# Patient Record
Sex: FEMALE | Race: WHITE | Employment: OTHER | URBAN - METROPOLITAN AREA
[De-identification: names, ages, dates, MRNs, and addresses within clinical notes are randomized per-mention and may not be internally consistent; named-entity substitution may affect disease eponyms.]

---

## 2019-01-16 ENCOUNTER — OP HISTORICAL/CONVERTED ENCOUNTER (OUTPATIENT)
Dept: OTHER | Age: 79
End: 2019-01-16

## 2019-01-31 ENCOUNTER — OP HISTORICAL/CONVERTED ENCOUNTER (OUTPATIENT)
Dept: OTHER | Age: 79
End: 2019-01-31

## 2019-02-01 ENCOUNTER — OP HISTORICAL/CONVERTED ENCOUNTER (OUTPATIENT)
Dept: OTHER | Age: 79
End: 2019-02-01

## 2019-02-04 ENCOUNTER — OP HISTORICAL/CONVERTED ENCOUNTER (OUTPATIENT)
Dept: OTHER | Age: 79
End: 2019-02-04

## 2019-02-11 ENCOUNTER — OP HISTORICAL/CONVERTED ENCOUNTER (OUTPATIENT)
Dept: OTHER | Age: 79
End: 2019-02-11

## 2019-03-01 ENCOUNTER — OP HISTORICAL/CONVERTED ENCOUNTER (OUTPATIENT)
Dept: OTHER | Age: 79
End: 2019-03-01

## 2019-03-04 ENCOUNTER — OP HISTORICAL/CONVERTED ENCOUNTER (OUTPATIENT)
Dept: OTHER | Age: 79
End: 2019-03-04

## 2019-05-24 ENCOUNTER — OP HISTORICAL/CONVERTED ENCOUNTER (OUTPATIENT)
Dept: OTHER | Age: 79
End: 2019-05-24

## 2021-08-06 ENCOUNTER — HOSPITAL ENCOUNTER (OUTPATIENT)
Dept: RADIATION THERAPY | Age: 81
Discharge: HOME | End: 2021-08-06

## 2022-01-10 ENCOUNTER — APPOINTMENT (OUTPATIENT)
Dept: GENERAL RADIOLOGY | Age: 82
DRG: 190 | End: 2022-01-10
Attending: EMERGENCY MEDICINE
Payer: MEDICARE

## 2022-01-10 ENCOUNTER — HOSPITAL ENCOUNTER (INPATIENT)
Age: 82
LOS: 2 days | Discharge: HOME | DRG: 190 | End: 2022-01-12
Attending: EMERGENCY MEDICINE | Admitting: HOSPITALIST
Payer: MEDICARE

## 2022-01-10 DIAGNOSIS — I24.9 ACS (ACUTE CORONARY SYNDROME) (HCC): Primary | ICD-10-CM

## 2022-01-10 PROBLEM — R06.02 SHORTNESS OF BREATH: Status: ACTIVE | Noted: 2022-01-10

## 2022-01-10 PROBLEM — R77.8 ELEVATED TROPONIN: Status: ACTIVE | Noted: 2022-01-10

## 2022-01-10 LAB
ALBUMIN SERPL-MCNC: 3.2 G/DL (ref 3.5–5)
ALBUMIN/GLOB SERPL: 1 {RATIO} (ref 1.1–2.2)
ALP SERPL-CCNC: 52 U/L (ref 45–117)
ALT SERPL-CCNC: 24 U/L (ref 12–78)
ANION GAP SERPL CALC-SCNC: 14 MMOL/L (ref 5–15)
AST SERPL W P-5'-P-CCNC: 29 U/L (ref 15–37)
ATRIAL RATE: 115 BPM
ATRIAL RATE: 94 BPM
BASOPHILS # BLD: 0 K/UL (ref 0–0.1)
BASOPHILS NFR BLD: 0 % (ref 0–1)
BILIRUB SERPL-MCNC: 0.7 MG/DL (ref 0.2–1)
BUN SERPL-MCNC: 14 MG/DL (ref 6–20)
BUN/CREAT SERPL: 15 (ref 12–20)
CA-I BLD-MCNC: 7.8 MG/DL (ref 8.5–10.1)
CALCULATED P AXIS, ECG09: 63 DEGREES
CALCULATED P AXIS, ECG09: 84 DEGREES
CALCULATED R AXIS, ECG10: 34 DEGREES
CALCULATED R AXIS, ECG10: 42 DEGREES
CALCULATED T AXIS, ECG11: 100 DEGREES
CALCULATED T AXIS, ECG11: 82 DEGREES
CHLORIDE SERPL-SCNC: 104 MMOL/L (ref 97–108)
CO2 SERPL-SCNC: 20 MMOL/L (ref 21–32)
CREAT SERPL-MCNC: 0.92 MG/DL (ref 0.55–1.02)
DIAGNOSIS, 93000: NORMAL
DIAGNOSIS, 93000: NORMAL
DIFFERENTIAL METHOD BLD: NORMAL
EOSINOPHIL # BLD: 0 K/UL (ref 0–0.4)
EOSINOPHIL NFR BLD: 0 % (ref 0–7)
ERYTHROCYTE [DISTWIDTH] IN BLOOD BY AUTOMATED COUNT: 12.6 % (ref 11.5–14.5)
GLOBULIN SER CALC-MCNC: 3.3 G/DL (ref 2–4)
GLUCOSE SERPL-MCNC: 118 MG/DL (ref 65–100)
HCT VFR BLD AUTO: 37.5 % (ref 35–47)
HGB BLD-MCNC: 12.6 G/DL (ref 11.5–16)
IMM GRANULOCYTES # BLD AUTO: 0 K/UL (ref 0–0.04)
IMM GRANULOCYTES NFR BLD AUTO: 0 % (ref 0–0.5)
INR PPP: 1.3 (ref 0.9–1.1)
LYMPHOCYTES # BLD: 1.9 K/UL (ref 0.8–3.5)
LYMPHOCYTES NFR BLD: 29 % (ref 12–49)
MAGNESIUM SERPL-MCNC: 1.7 MG/DL (ref 1.6–2.4)
MCH RBC QN AUTO: 31.3 PG (ref 26–34)
MCHC RBC AUTO-ENTMCNC: 33.6 G/DL (ref 30–36.5)
MCV RBC AUTO: 93.1 FL (ref 80–99)
MONOCYTES # BLD: 0.5 K/UL (ref 0–1)
MONOCYTES NFR BLD: 7 % (ref 5–13)
NEUTS SEG # BLD: 4.3 K/UL (ref 1.8–8)
NEUTS SEG NFR BLD: 64 % (ref 32–75)
P-R INTERVAL, ECG05: 140 MS
P-R INTERVAL, ECG05: 146 MS
PLATELET # BLD AUTO: 166 K/UL (ref 150–400)
PMV BLD AUTO: 9.8 FL (ref 8.9–12.9)
POTASSIUM SERPL-SCNC: 3.5 MMOL/L (ref 3.5–5.1)
PROT SERPL-MCNC: 6.5 G/DL (ref 6.4–8.2)
PROTHROMBIN TIME: 12.7 SEC (ref 9–11.1)
Q-T INTERVAL, ECG07: 368 MS
Q-T INTERVAL, ECG07: 402 MS
QRS DURATION, ECG06: 124 MS
QRS DURATION, ECG06: 126 MS
QTC CALCULATION (BEZET), ECG08: 502 MS
QTC CALCULATION (BEZET), ECG08: 509 MS
RBC # BLD AUTO: 4.03 M/UL (ref 3.8–5.2)
SARS-COV-2, COV2: NORMAL
SARS-COV-2, XPLCVT: NOT DETECTED
SODIUM SERPL-SCNC: 138 MMOL/L (ref 136–145)
SOURCE, COVRS: NORMAL
TROPONIN-HIGH SENSITIVITY: 140 NG/L (ref 0–51)
TROPONIN-HIGH SENSITIVITY: 33 NG/L (ref 0–51)
TROPONIN-HIGH SENSITIVITY: 370 NG/L (ref 0–51)
VENTRICULAR RATE, ECG03: 115 BPM
VENTRICULAR RATE, ECG03: 94 BPM
WBC # BLD AUTO: 6.7 K/UL (ref 3.6–11)

## 2022-01-10 PROCEDURE — 93005 ELECTROCARDIOGRAM TRACING: CPT

## 2022-01-10 PROCEDURE — 80053 COMPREHEN METABOLIC PANEL: CPT

## 2022-01-10 PROCEDURE — 83735 ASSAY OF MAGNESIUM: CPT

## 2022-01-10 PROCEDURE — 65270000029 HC RM PRIVATE

## 2022-01-10 PROCEDURE — 85610 PROTHROMBIN TIME: CPT

## 2022-01-10 PROCEDURE — U0005 INFEC AGEN DETEC AMPLI PROBE: HCPCS

## 2022-01-10 PROCEDURE — 74011250636 HC RX REV CODE- 250/636: Performed by: EMERGENCY MEDICINE

## 2022-01-10 PROCEDURE — 71045 X-RAY EXAM CHEST 1 VIEW: CPT

## 2022-01-10 PROCEDURE — 84484 ASSAY OF TROPONIN QUANT: CPT

## 2022-01-10 PROCEDURE — 36415 COLL VENOUS BLD VENIPUNCTURE: CPT

## 2022-01-10 PROCEDURE — 99285 EMERGENCY DEPT VISIT HI MDM: CPT

## 2022-01-10 PROCEDURE — 96365 THER/PROPH/DIAG IV INF INIT: CPT

## 2022-01-10 PROCEDURE — 85025 COMPLETE CBC W/AUTO DIFF WBC: CPT

## 2022-01-10 RX ORDER — LISINOPRIL 10 MG/1
5 TABLET ORAL DAILY
Status: DISCONTINUED | OUTPATIENT
Start: 2022-01-11 | End: 2022-01-12 | Stop reason: HOSPADM

## 2022-01-10 RX ORDER — SODIUM CHLORIDE 0.9 % (FLUSH) 0.9 %
5-40 SYRINGE (ML) INJECTION EVERY 8 HOURS
Status: DISCONTINUED | OUTPATIENT
Start: 2022-01-10 | End: 2022-01-12 | Stop reason: HOSPADM

## 2022-01-10 RX ORDER — LEVOTHYROXINE SODIUM 88 UG/1
88 TABLET ORAL
Status: DISCONTINUED | OUTPATIENT
Start: 2022-01-11 | End: 2022-01-12 | Stop reason: HOSPADM

## 2022-01-10 RX ORDER — MAGNESIUM SULFATE 1 G/100ML
1 INJECTION INTRAVENOUS
Status: COMPLETED | OUTPATIENT
Start: 2022-01-10 | End: 2022-01-10

## 2022-01-10 RX ORDER — SODIUM CHLORIDE 0.9 % (FLUSH) 0.9 %
5-40 SYRINGE (ML) INJECTION AS NEEDED
Status: DISCONTINUED | OUTPATIENT
Start: 2022-01-10 | End: 2022-01-12 | Stop reason: HOSPADM

## 2022-01-10 RX ADMIN — MAGNESIUM SULFATE HEPTAHYDRATE 1 G: 1 INJECTION, SOLUTION INTRAVENOUS at 06:30

## 2022-01-10 NOTE — Clinical Note
[address] [facility] EMERGENCY DEPARTMENT  [address] [address] [phone]    Work/School Note    Date: 1/10/2022     To Whom It May concern:    Haley Jaime was evaluated by the following provider(s):  Attending Provider: Yulissa Cruz [facility] virus is suspected. Per the CDC guidelines we recommend home isolation until the following conditions are all met:    1. At least five days have passed since symptoms first appeared and/or had a close exposure,   2. After home isolation for five days, wearing a mask around others for the next five days,  3. At least 24 have passed since last fever without the use of fever-reducing medications and  4.  Symptoms (eg cough, shortness of breath) have improved      Sincerely,          Fran Ferrera MD

## 2022-01-10 NOTE — ED PROVIDER NOTES
EMERGENCY DEPARTMENT HISTORY AND PHYSICAL EXAM      Date: 1/10/2022  Patient Name: Henry Blevins      History of Presenting Illness     Chief Complaint   Patient presents with    Shortness of Breath       History Provided By: Patient and EMS    HPI: Henry Blevins, 80 y.o. female with a past medical history significant Lung CA and COPD presents to the ED with cc of increased shortness of breath at home. Patient treated herself with nebulizer treatments without complete relief of her symptoms. She says been going on and getting progressively worse over the past couple of days. She called EMS and she was satting at 90% on room air with mild increased work of breathing and mild diffuse wheezing. Symptoms have improved since arrival where she was received albuterol and Atrovent and steroids in route. There are no other complaints, changes, or physical findings at this time. PCP: Umer Turner MD    Current Outpatient Medications   Medication Sig Dispense Refill    albuterol-ipratropium (DUO-NEB) 2.5 mg-0.5 mg/3 ml nebu 3 mL by Nebulization route every four (4) hours as needed for Wheezing. 30 Nebule 0    Nebulizer & Compressor machine 1 Each by Does Not Apply route four (4) times daily as needed for Wheezing or Shortness of Breath. 1 Each 0    apixaban (ELIQUIS) 2.5 mg tablet Take 1 Tablet by mouth two (2) times a day for 30 days. 60 Tablet 0    azithromycin (ZITHROMAX) 500 mg tab Take 1 Tablet by mouth daily for 3 doses. 3 Tablet 0    budesonide-formoteroL (SYMBICORT) 160-4.5 mcg/actuation HFAA Take 2 Puffs by inhalation two (2) times a day. 10.2 g 1    potassium chloride SR (K-TAB) 20 mEq tablet Take 1 Tablet by mouth daily for 60 days. 30 Tablet 1    predniSONE (DELTASONE) 10 mg tablet Take 20 mg by mouth two (2) times a day for 2 days, THEN 10 mg two (2) times a day for 2 days, THEN 10 mg daily for 3 days.  15 Tablet 0    lisinopriL (PRINIVIL, ZESTRIL) 5 mg tablet Take 5 mg by mouth daily. Indications: high blood pressure      levothyroxine (Synthroid) 88 mcg tablet Take 88 mcg by mouth Daily (before breakfast). 1 pill daily except on sundays         Past History     Past Medical History:  Past Medical History:   Diagnosis Date    Chronic obstructive pulmonary disease ([facility] [address]     Hypertension     Lung cancer ([facility] [address]        Past Surgical History:  History reviewed. No pertinent surgical history. Family History:  History reviewed. No pertinent family history. Social History:  Social History     Tobacco Use    Smoking status: Never Smoker    Smokeless tobacco: Never Used   Substance Use Topics    Alcohol use: Not Currently    Drug use: Not Currently       Allergies:  No Known Allergies      Review of Systems     Review of Systems   Constitutional: Negative. Negative for appetite change, chills, fatigue and fever. HENT: Negative. Negative for congestion and sinus pain. Eyes: Negative. Negative for pain and visual disturbance. Respiratory: Positive for shortness of breath. Negative for chest tightness. Cardiovascular: Negative. Negative for chest pain. Gastrointestinal: Negative. Negative for abdominal pain, diarrhea, nausea and vomiting. Genitourinary: Negative. Negative for difficulty urinating. No discharge   Musculoskeletal: Negative. Negative for arthralgias. Skin: Negative. Negative for rash. Neurological: Negative. Negative for weakness and headaches. Hematological: Negative. Psychiatric/Behavioral: Negative. Negative for agitation. The patient is not nervous/anxious. All other systems reviewed and are negative. Physical Exam     Physical Exam  Vitals and nursing note reviewed. Constitutional:       General: She is not in acute distress. Appearance: She is well-developed. HENT:      Head: Normocephalic and atraumatic.       Nose: Nose normal.      Mouth/Throat:      Mouth: Mucous membranes are moist.      Pharynx: Oropharynx is clear. No oropharyngeal exudate. Eyes:      General:         Right eye: No discharge. Left eye: No discharge. Conjunctiva/sclera: Conjunctivae normal.      Pupils: Pupils are equal, round, and reactive to light. Cardiovascular:      Rate and Rhythm: Regular rhythm. Tachycardia present. Chest Wall: PMI is not displaced. No thrill. Heart sounds: Normal heart sounds. No murmur heard. No friction rub. No gallop. Pulmonary:      Effort: Pulmonary effort is normal. Tachypnea present. No respiratory distress. Breath sounds: Examination of the right-upper field reveals wheezing. Examination of the left-upper field reveals wheezing. Examination of the right-middle field reveals wheezing. Examination of the left-middle field reveals wheezing. Examination of the right-lower field reveals wheezing. Examination of the left-lower field reveals wheezing. Wheezing present. No rales. Chest:      Chest wall: No tenderness. Abdominal:      General: Bowel sounds are normal. There is no distension. Palpations: Abdomen is soft. There is no mass. Tenderness: There is no abdominal tenderness. There is no guarding or rebound. Musculoskeletal:         General: Normal range of motion. Cervical back: Normal range of motion and neck supple. Lymphadenopathy:      Cervical: No cervical adenopathy. Skin:     General: Skin is warm and dry. Capillary Refill: Capillary refill takes less than 2 seconds. Findings: No erythema or rash. Neurological:      Mental Status: She is alert and oriented to person, place, and time. Cranial Nerves: No cranial nerve deficit. Coordination: Coordination normal.   Psychiatric:         Mood and Affect: Mood normal.         Behavior: Behavior normal.         Lab and Diagnostic Study Results     Labs -   No results found for this or any previous visit (from the past 12 hour(s)).     Radiologic Studies -   [unfilled]  CT Results  (Last 48 hours)    None        CXR Results  (Last 48 hours)    None          Medical Decision Making and ED Course   - I am the first and primary provider for this patient AND AM THE PRIMARY PROVIDER OF RECORD. - I reviewed the vital signs, available nursing notes, past medical history, past surgical history, family history and social history. - Initial assessment performed. The patients presenting problems have been discussed, and the staff are in agreement with the care plan formulated and outlined with them. I have encouraged them to ask questions as they arise throughout their visit. Vital Signs-Reviewed the patient's vital signs. No data found. EKG interpretation: (Preliminary): Performed at 6:29 AM, and read at 6:29 AM  Ventricular rate 150 bpm,  ms, QRS duration 124 ms,  ms. Interpretation: Sinus tach with nonspecific intraventricular conduction delay. Abnormal EKG. Records Reviewed: Nursing Notes, Old Medical Records and Ambulance Run Sheet    The patient presents with shortness of breath with a differential diagnosis of ACS, arrhythmia, COPD, Covid, bronchitis. Will assess with basic cardiac labs EKG and chest x-ray and will continue with serial treatments and exams. ED Course:       ED Course as of 01/13/22 1336   Mon Hernán 10, 2022   0945 Discussed elevated troponin on repeat exam with patient and family member present. Discussed plan to admit for continued monitoring, patient states her cardiologist is Dr. Chris Castro. [CS]   2649 PEYXQ with Dr. Xiao Nguyễn, hospitalist.  Will admit at this time for the elevated troponin. [CS]      ED Course User Index  [CS] Chelsea Peña MD         Provider Notes (Medical Decision Making):   carlos is being admitted for ACS    MDM           Consultations:       Consultations: -  Hospitalist Consultant: Dr. Magdalene Bertrand: We have asked for emergent assistance with regard to this patient.  We have discussed the patients HPI, ROS, PE and results this far.  They will come and evaluate the patient for admission. Procedures and Critical Care       Performed by: Ritika Smith MD  PROCEDURES:  Procedures         Diagnosis     Clinical Impression:   1. [facility]        Attestations:    Ritika Smith MD    Please note that this dictation was completed with [employer], the computer voice recognition software. Quite often unanticipated grammatical, syntax, homophones, and other interpretive errors are inadvertently transcribed by the computer software. Please disregard these errors. Please excuse any errors that have escaped final proofreading. Thank you.

## 2022-01-11 ENCOUNTER — APPOINTMENT (OUTPATIENT)
Dept: NON INVASIVE DIAGNOSTICS | Age: 82
DRG: 190 | End: 2022-01-11
Attending: HOSPITALIST
Payer: MEDICARE

## 2022-01-11 PROBLEM — J44.9 COPD (CHRONIC OBSTRUCTIVE PULMONARY DISEASE) (HCC): Status: ACTIVE | Noted: 2022-01-11

## 2022-01-11 LAB
ALBUMIN SERPL-MCNC: 3.7 G/DL (ref 3.5–5)
ALBUMIN/GLOB SERPL: 0.8 {RATIO} (ref 1.1–2.2)
ALP SERPL-CCNC: 57 U/L (ref 45–117)
ALT SERPL-CCNC: 31 U/L (ref 12–78)
ANION GAP SERPL CALC-SCNC: 7 MMOL/L (ref 5–15)
AST SERPL W P-5'-P-CCNC: 36 U/L (ref 15–37)
BASOPHILS # BLD: 0 K/UL (ref 0–0.1)
BASOPHILS NFR BLD: 0 % (ref 0–1)
BILIRUB SERPL-MCNC: 0.6 MG/DL (ref 0.2–1)
BUN SERPL-MCNC: 19 MG/DL (ref 6–20)
BUN/CREAT SERPL: 16 (ref 12–20)
CA-I BLD-MCNC: 9.2 MG/DL (ref 8.5–10.1)
CHLORIDE SERPL-SCNC: 101 MMOL/L (ref 97–108)
CO2 SERPL-SCNC: 26 MMOL/L (ref 21–32)
CREAT SERPL-MCNC: 1.16 MG/DL (ref 0.55–1.02)
DIFFERENTIAL METHOD BLD: ABNORMAL
ECHO AO ASC DIAM: 2.7 CM
ECHO AO ASCENDING AORTA INDEX: 1.8 CM/M2
ECHO AO ROOT DIAM: 2.9 CM
ECHO AO ROOT INDEX: 1.93 CM/M2
ECHO AV AREA PEAK VELOCITY: 2.2 CM2
ECHO AV AREA VTI: 2.6 CM2
ECHO AV AREA/BSA PEAK VELOCITY: 1.5 CM2/M2
ECHO AV AREA/BSA VTI: 1.7 CM2/M2
ECHO AV MEAN GRADIENT: 5 MMHG
ECHO AV MEAN VELOCITY: 1 M/S
ECHO AV PEAK GRADIENT: 8 MMHG
ECHO AV PEAK VELOCITY: 1.5 M/S
ECHO AV VELOCITY RATIO: 0.87
ECHO AV VTI: 23.3 CM
ECHO LA AREA 2C: 10.3 CM2
ECHO LA AREA 4C: 8.2 CM2
ECHO LA DIAMETER INDEX: 1.53 CM/M2
ECHO LA DIAMETER: 2.3 CM
ECHO LA MAJOR AXIS: 3.4 CM
ECHO LA MINOR AXIS: 3.5 CM
ECHO LA TO AORTIC ROOT RATIO: 0.79
ECHO LA VOL BP: 19 ML (ref 22–52)
ECHO LA VOL/BSA BIPLANE: 13 ML/M2 (ref 16–34)
ECHO LV E' LATERAL VELOCITY: 5 CM/S
ECHO LV E' SEPTAL VELOCITY: 6 CM/S
ECHO LV EDV A2C: 51 ML
ECHO LV EDV A4C: 53 ML
ECHO LV EDV BP: 54 ML (ref 56–104)
ECHO LV EDV INDEX A4C: 35 ML/M2
ECHO LV EDV INDEX BP: 36 ML/M2
ECHO LV EDV NDEX A2C: 34 ML/M2
ECHO LV EJECTION FRACTION A2C: 59 %
ECHO LV EJECTION FRACTION A4C: 58 %
ECHO LV EJECTION FRACTION BIPLANE: 59 % (ref 55–100)
ECHO LV ESV A2C: 21 ML
ECHO LV ESV A4C: 22 ML
ECHO LV ESV INDEX A2C: 14 ML/M2
ECHO LV ESV INDEX A4C: 15 ML/M2
ECHO LV INTERNAL DIMENSION DIASTOLE INDEX: 2.33 CM/M2
ECHO LV INTERNAL DIMENSION DIASTOLIC: 3.5 CM (ref 3.9–5.3)
ECHO LV IVSD: 0.7 CM (ref 0.6–0.9)
ECHO LV MASS 2D: 62.8 G (ref 67–162)
ECHO LV MASS INDEX 2D: 41.9 G/M2 (ref 43–95)
ECHO LV POSTERIOR WALL DIASTOLIC: 0.7 CM (ref 0.6–0.9)
ECHO LV RELATIVE WALL THICKNESS RATIO: 0.4
ECHO LVOT AREA: 2.5 CM2
ECHO LVOT AV VTI INDEX: 1.02
ECHO LVOT DIAM: 1.8 CM
ECHO LVOT MEAN GRADIENT: 3 MMHG
ECHO LVOT PEAK GRADIENT: 7 MMHG
ECHO LVOT PEAK VELOCITY: 1.3 M/S
ECHO LVOT STROKE VOLUME INDEX: 40.4 ML/M2
ECHO LVOT SV: 60.5 ML
ECHO LVOT VTI: 23.8 CM
ECHO MV A VELOCITY: 1.66 M/S
ECHO MV AREA VTI: 1.8 CM2
ECHO MV E VELOCITY: 1.31 M/S
ECHO MV E/A RATIO: 0.79
ECHO MV E/E' LATERAL: 26.2
ECHO MV E/E' RATIO (AVERAGED): 24.02
ECHO MV E/E' SEPTAL: 21.83
ECHO MV LVOT VTI INDEX: 1.4
ECHO MV MAX VELOCITY: 2 M/S
ECHO MV MEAN GRADIENT: 6 MMHG
ECHO MV MEAN VELOCITY: 1.1 M/S
ECHO MV PEAK GRADIENT: 15 MMHG
ECHO MV REGURGITANT PEAK GRADIENT: 31 MMHG
ECHO MV REGURGITANT PEAK VELOCITY: 2.8 M/S
ECHO MV REGURGITANT VTIA: 42.9 CM
ECHO MV VTI: 33.3 CM
ECHO PULMONARY ARTERY END DIASTOLIC PRESSURE: 11 MMHG
ECHO PV MAX VELOCITY: 1.5 M/S
ECHO PV MEAN GRADIENT: 5 MMHG
ECHO PV MEAN VELOCITY: 1 M/S
ECHO PV PEAK GRADIENT: 9 MMHG
ECHO PV REGURGITANT MAX VELOCITY: 1.7 M/S
ECHO PV VTI: 25.6 CM
ECHO PVEIN PEAK D VELOCITY: 0.7 M/S
ECHO PVEIN PEAK S VELOCITY: 1 M/S
ECHO PVEIN S/D RATIO: 1.4 NO UNITS
ECHO RV BASAL DIMENSION: 2.9 CM
ECHO RV MID DIMENSION: 2 CM
ECHO RV TAPSE: 1.8 CM (ref 1.5–2)
ECHO TV REGURGITANT MAX VELOCITY: 1.87 M/S
ECHO TV REGURGITANT PEAK GRADIENT: 14 MMHG
EOSINOPHIL # BLD: 0 K/UL (ref 0–0.4)
EOSINOPHIL NFR BLD: 0 % (ref 0–7)
ERYTHROCYTE [DISTWIDTH] IN BLOOD BY AUTOMATED COUNT: 12.6 % (ref 11.5–14.5)
GLOBULIN SER CALC-MCNC: 4.5 G/DL (ref 2–4)
GLUCOSE SERPL-MCNC: 139 MG/DL (ref 65–100)
HCT VFR BLD AUTO: 40.6 % (ref 35–47)
HGB BLD-MCNC: 13.5 G/DL (ref 11.5–16)
IMM GRANULOCYTES # BLD AUTO: 0 K/UL (ref 0–0.04)
IMM GRANULOCYTES NFR BLD AUTO: 0 % (ref 0–0.5)
LYMPHOCYTES # BLD: 1 K/UL (ref 0.8–3.5)
LYMPHOCYTES NFR BLD: 11 % (ref 12–49)
MCH RBC QN AUTO: 30.9 PG (ref 26–34)
MCHC RBC AUTO-ENTMCNC: 33.3 G/DL (ref 30–36.5)
MCV RBC AUTO: 92.9 FL (ref 80–99)
MONOCYTES # BLD: 0.2 K/UL (ref 0–1)
MONOCYTES NFR BLD: 2 % (ref 5–13)
NEUTS SEG # BLD: 8.3 K/UL (ref 1.8–8)
NEUTS SEG NFR BLD: 87 % (ref 32–75)
NRBC # BLD: 0 K/UL (ref 0–0.01)
NRBC BLD-RTO: 0 PER 100 WBC
PLATELET # BLD AUTO: 199 K/UL (ref 150–400)
PMV BLD AUTO: 10.4 FL (ref 8.9–12.9)
POTASSIUM SERPL-SCNC: 3.4 MMOL/L (ref 3.5–5.1)
PROT SERPL-MCNC: 8.2 G/DL (ref 6.4–8.2)
RBC # BLD AUTO: 4.37 M/UL (ref 3.8–5.2)
SODIUM SERPL-SCNC: 134 MMOL/L (ref 136–145)
TROPONIN-HIGH SENSITIVITY: 273 NG/L (ref 0–51)
WBC # BLD AUTO: 9.6 K/UL (ref 3.6–11)

## 2022-01-11 PROCEDURE — 74011250637 HC RX REV CODE- 250/637: Performed by: HOSPITALIST

## 2022-01-11 PROCEDURE — 74011000250 HC RX REV CODE- 250: Performed by: HOSPITALIST

## 2022-01-11 PROCEDURE — 74011250637 HC RX REV CODE- 250/637: Performed by: EMERGENCY MEDICINE

## 2022-01-11 PROCEDURE — 94640 AIRWAY INHALATION TREATMENT: CPT

## 2022-01-11 PROCEDURE — 74011250637 HC RX REV CODE- 250/637: Performed by: STUDENT IN AN ORGANIZED HEALTH CARE EDUCATION/TRAINING PROGRAM

## 2022-01-11 PROCEDURE — 93306 TTE W/DOPPLER COMPLETE: CPT

## 2022-01-11 PROCEDURE — 80053 COMPREHEN METABOLIC PANEL: CPT

## 2022-01-11 PROCEDURE — 36415 COLL VENOUS BLD VENIPUNCTURE: CPT

## 2022-01-11 PROCEDURE — 65270000029 HC RM PRIVATE

## 2022-01-11 PROCEDURE — 74011000250 HC RX REV CODE- 250: Performed by: EMERGENCY MEDICINE

## 2022-01-11 PROCEDURE — 74011250636 HC RX REV CODE- 250/636: Performed by: HOSPITALIST

## 2022-01-11 PROCEDURE — 85025 COMPLETE CBC W/AUTO DIFF WBC: CPT

## 2022-01-11 PROCEDURE — 84484 ASSAY OF TROPONIN QUANT: CPT

## 2022-01-11 RX ORDER — AZITHROMYCIN 500 MG/1
500 TABLET, FILM COATED ORAL DAILY
Status: DISCONTINUED | OUTPATIENT
Start: 2022-01-11 | End: 2022-01-12 | Stop reason: HOSPADM

## 2022-01-11 RX ORDER — ACETAMINOPHEN 650 MG/1
650 SUPPOSITORY RECTAL
Status: DISCONTINUED | OUTPATIENT
Start: 2022-01-11 | End: 2022-01-12 | Stop reason: HOSPADM

## 2022-01-11 RX ORDER — HYDROXYZINE PAMOATE 25 MG/1
25 CAPSULE ORAL ONCE
Status: COMPLETED | OUTPATIENT
Start: 2022-01-11 | End: 2022-01-11

## 2022-01-11 RX ORDER — BUDESONIDE AND FORMOTEROL FUMARATE DIHYDRATE 160; 4.5 UG/1; UG/1
2 AEROSOL RESPIRATORY (INHALATION)
Status: DISCONTINUED | OUTPATIENT
Start: 2022-01-11 | End: 2022-01-12 | Stop reason: HOSPADM

## 2022-01-11 RX ORDER — IPRATROPIUM BROMIDE AND ALBUTEROL SULFATE 2.5; .5 MG/3ML; MG/3ML
3 SOLUTION RESPIRATORY (INHALATION)
Status: DISCONTINUED | OUTPATIENT
Start: 2022-01-11 | End: 2022-01-12 | Stop reason: HOSPADM

## 2022-01-11 RX ORDER — LISINOPRIL 5 MG/1
5 TABLET ORAL DAILY
COMMUNITY

## 2022-01-11 RX ORDER — SODIUM CHLORIDE 0.9 % (FLUSH) 0.9 %
5-40 SYRINGE (ML) INJECTION EVERY 8 HOURS
Status: DISCONTINUED | OUTPATIENT
Start: 2022-01-11 | End: 2022-01-12 | Stop reason: HOSPADM

## 2022-01-11 RX ORDER — ONDANSETRON 4 MG/1
4 TABLET, ORALLY DISINTEGRATING ORAL
Status: DISCONTINUED | OUTPATIENT
Start: 2022-01-11 | End: 2022-01-12 | Stop reason: HOSPADM

## 2022-01-11 RX ORDER — ACETAMINOPHEN 325 MG/1
650 TABLET ORAL
Status: DISCONTINUED | OUTPATIENT
Start: 2022-01-11 | End: 2022-01-12 | Stop reason: HOSPADM

## 2022-01-11 RX ORDER — POTASSIUM CHLORIDE 750 MG/1
20 TABLET, FILM COATED, EXTENDED RELEASE ORAL DAILY
Status: DISCONTINUED | OUTPATIENT
Start: 2022-01-12 | End: 2022-01-12 | Stop reason: HOSPADM

## 2022-01-11 RX ORDER — SODIUM CHLORIDE 0.9 % (FLUSH) 0.9 %
5-40 SYRINGE (ML) INJECTION AS NEEDED
Status: DISCONTINUED | OUTPATIENT
Start: 2022-01-11 | End: 2022-01-12 | Stop reason: HOSPADM

## 2022-01-11 RX ORDER — ONDANSETRON 2 MG/ML
4 INJECTION INTRAMUSCULAR; INTRAVENOUS
Status: DISCONTINUED | OUTPATIENT
Start: 2022-01-11 | End: 2022-01-12 | Stop reason: HOSPADM

## 2022-01-11 RX ORDER — POLYETHYLENE GLYCOL 3350 17 G/17G
17 POWDER, FOR SOLUTION ORAL DAILY PRN
Status: DISCONTINUED | OUTPATIENT
Start: 2022-01-11 | End: 2022-01-12 | Stop reason: HOSPADM

## 2022-01-11 RX ORDER — LEVOTHYROXINE SODIUM 88 UG/1
88 TABLET ORAL
COMMUNITY
End: 2022-03-04 | Stop reason: SDUPTHER

## 2022-01-11 RX ADMIN — LISINOPRIL 5 MG: 10 TABLET ORAL at 07:39

## 2022-01-11 RX ADMIN — AZITHROMYCIN MONOHYDRATE 500 MG: 500 TABLET ORAL at 13:22

## 2022-01-11 RX ADMIN — METHYLPREDNISOLONE SODIUM SUCCINATE 40 MG: 40 INJECTION, POWDER, FOR SOLUTION INTRAMUSCULAR; INTRAVENOUS at 05:26

## 2022-01-11 RX ADMIN — LEVOTHYROXINE SODIUM 88 MCG: 0.09 TABLET ORAL at 05:26

## 2022-01-11 RX ADMIN — SODIUM CHLORIDE, PRESERVATIVE FREE 10 ML: 5 INJECTION INTRAVENOUS at 21:21

## 2022-01-11 RX ADMIN — BUDESONIDE AND FORMOTEROL FUMARATE DIHYDRATE 2 PUFF: 160; 4.5 AEROSOL RESPIRATORY (INHALATION) at 20:43

## 2022-01-11 RX ADMIN — HYDROXYZINE PAMOATE 25 MG: 25 CAPSULE ORAL at 21:21

## 2022-01-11 RX ADMIN — SODIUM CHLORIDE, PRESERVATIVE FREE 10 ML: 5 INJECTION INTRAVENOUS at 13:22

## 2022-01-11 RX ADMIN — SODIUM CHLORIDE, PRESERVATIVE FREE 10 ML: 5 INJECTION INTRAVENOUS at 05:26

## 2022-01-11 RX ADMIN — METHYLPREDNISOLONE SODIUM SUCCINATE 40 MG: 40 INJECTION, POWDER, FOR SOLUTION INTRAMUSCULAR; INTRAVENOUS at 13:22

## 2022-01-11 RX ADMIN — METHYLPREDNISOLONE SODIUM SUCCINATE 40 MG: 40 INJECTION, POWDER, FOR SOLUTION INTRAMUSCULAR; INTRAVENOUS at 21:20

## 2022-01-11 RX ADMIN — LISINOPRIL 5 MG: 10 TABLET ORAL at 09:00

## 2022-01-11 RX ADMIN — APIXABAN 2.5 MG: 5 TABLET, FILM COATED ORAL at 08:41

## 2022-01-11 RX ADMIN — APIXABAN 2.5 MG: 5 TABLET, FILM COATED ORAL at 21:20

## 2022-01-11 RX ADMIN — BUDESONIDE AND FORMOTEROL FUMARATE DIHYDRATE 2 PUFF: 160; 4.5 AEROSOL RESPIRATORY (INHALATION) at 08:57

## 2022-01-11 RX ADMIN — SODIUM CHLORIDE, PRESERVATIVE FREE 10 ML: 5 INJECTION INTRAVENOUS at 14:00

## 2022-01-11 NOTE — PROGRESS NOTES
Hospitalist Progress Note    Subjective:   Daily Progress Note: 1/11/2022 10:56 AM    Hospital Course:  Lawyer Dodson is an 80-year-old female with a PMHx of COPD, lung cancer follows with Dr. Anaid Mcknight, and hypertension who was brought in by her daughter to the ED with a primary complaint of worsening shortness of breath x1 day. Daughter initially took the patient to the [address] [place] [address]. In the freestanding ED, patient was noted to have significant wheezing with SpO2 in 90s. She received 3-4 nebulizer treatments, magnesium, and Symbicort inhaler without significant relief. Also noted to have elevated troponin on labs. Chest x-ray showing left lung base atelectasis/airspace disease, could be chronic findings as similar abnormality was present on prior CT chest. Transferred to [address] [place] [address] for further care and admitted for acute respiratory failure secondary to COPD exacerbation and troponin leak. Started on IV solu-medrol and Zithromax prophylactically. Cardiology consult. Subjective:    Patient seen and examined ambulating around room without difficulty. She reports shortness of breath ambulating long distances. Improvement in wheezing.      Current Facility-Administered Medications   Medication Dose Route Frequency    albuterol-ipratropium (DUO-NEB) 2.5 MG-0.5 MG/3 ML  3 mL Nebulization Q4H PRN    budesonide-formoteroL (SYMBICORT) 160-4.5 mcg/actuation HFA inhaler 2 Puff  2 Puff Inhalation BID RT    methylPREDNISolone (PF) (SOLU-MEDROL) injection 40 mg  40 mg IntraVENous Q8H    sodium chloride (NS) flush 5-40 mL  5-40 mL IntraVENous Q8H    sodium chloride (NS) flush 5-40 mL  5-40 mL IntraVENous PRN    acetaminophen (TYLENOL) tablet 650 mg  650 mg Oral Q6H PRN    Or    acetaminophen (TYLENOL) suppository 650 mg  650 mg Rectal Q6H PRN    polyethylene glycol (MIRALAX) packet 17 g  17 g Oral DAILY PRN    ondansetron (ZOFRAN ODT) tablet 4 mg  4 mg Oral Q8H PRN    Or    ondansetron (ZOFRAN) injection 4 mg  4 mg IntraVENous Q6H PRN    sodium chloride (NS) flush 5-40 mL  5-40 mL IntraVENous Q8H    sodium chloride (NS) flush 5-40 mL  5-40 mL IntraVENous PRN    apixaban (ELIQUIS) tablet 2.5 mg  2.5 mg Oral BID    levothyroxine (SYNTHROID) tablet 88 mcg  88 mcg Oral 6am    lisinopriL (PRINIVIL, ZESTRIL) tablet 5 mg  5 mg Oral DAILY        Review of Systems  Constitutional: No fevers, No chills, No sweats, No fatigue, No Weakness  Eyes: No redness  ENT: No nasal congestion, No sore throat, No voice change  Respiratory: + Shortness of Breath, + wheezing, No cough  Cardiovascular: No chest pain, No palpitations, No extremity edema  Gastrointestinal: No nausea, No vomiting, No diarrhea, No abdominal pain  Genitourinary: No frequency, No dysuria, No hematuria  Integument/breast: No skin lesion(s)   Neurological: No Confusion, No headaches, No dizziness      Objective:     Visit Vitals  /74   Pulse 84   Temp 98 °F (36.7 °C)   Resp 18   Ht 5' (1.524 m)   Wt 54.4 kg (120 lb)   SpO2 96%   BMI 23.44 kg/m²      O2 Device: None (Room air)    Temp (24hrs), Av.1 °F (36.7 °C), Min:97.6 °F (36.4 °C), Max:98.6 °F (37 °C)      No intake/output data recorded.  1901 -  0700  In: 100 [I.V.:100]  Out: -     PHYSICAL EXAM:  Constitutional: No acute distress  Skin: Extremities and face reveal no rashes. HEENT: Sclerae anicteric. PERRL. No oral ulcers. The neck is supple and no masses. Cardiovascular: Regular rate and rhythm. +S1/S2. No murmur or gallop. Respiratory:  Symmetric chest wall expansion. Decreased air entry bases with expiratory wheezing bilaterally. No rhonchi or rales. On room air. GI: Abdomen nondistended, soft, and nontender. Normal active bowel sounds. Rectal: Deferred   Musculoskeletal: No pitting edema of the lower legs. Able to move all ext  Neurological:  Patient is alert and oriented.  Cranial nerves II-XII grossly intact  Psychiatric: Mood appears appropriate       Data Review    Recent Results (from the past 24 hour(s))   TROPONIN-HIGH SENSITIVITY    Collection Time: 01/10/22  6:00 PM   Result Value Ref Range    Troponin-High Sensitivity 370 (HH) 0 - 51 ng/L   EKG, 12 LEAD, SUBSEQUENT    Collection Time: 01/10/22  6:06 PM   Result Value Ref Range    Ventricular Rate 94 BPM    Atrial Rate 94 BPM    P-R Interval 146 ms    QRS Duration 126 ms    Q-T Interval 402 ms    QTC Calculation (Bezet) 502 ms    Calculated P Axis 63 degrees    Calculated R Axis 42 degrees    Calculated T Axis 100 degrees    Diagnosis       Normal sinus rhythm  Non-specific intra-ventricular conduction block  Cannot rule out Anterior infarct , age undetermined  T wave abnormality, consider lateral ischemia  Abnormal ECG  When compared with ECG of 10-TAYA-2022 18:06,  No significant change was found  Confirmed by WES Carrasco MD [id number]) on 1/10/2022 6:38:22 PM     CBC WITH AUTOMATED DIFF    Collection Time: 01/11/22  9:02 AM   Result Value Ref Range    WBC 9.6 3.6 - 11.0 K/uL    RBC 4.37 3.80 - 5.20 M/uL    HGB 13.5 11.5 - 16.0 g/dL    HCT 40.6 35.0 - 47.0 %    MCV 92.9 80.0 - 99.0 FL    MCH 30.9 26.0 - 34.0 PG    MCHC 33.3 30.0 - 36.5 g/dL    RDW 12.6 11.5 - 14.5 %    PLATELET 220 108 - 741 K/uL    MPV 10.4 8.9 - 12.9 FL    NRBC 0.0 0.0  WBC    ABSOLUTE NRBC 0.00 0.00 - 0.01 K/uL    NEUTROPHILS 87 (H) 32 - 75 %    LYMPHOCYTES 11 (L) 12 - 49 %    MONOCYTES 2 (L) 5 - 13 %    EOSINOPHILS 0 0 - 7 %    BASOPHILS 0 0 - 1 %    IMMATURE GRANULOCYTES 0 0 - 0.5 %    ABS. NEUTROPHILS 8.3 (H) 1.8 - 8.0 K/UL    ABS. LYMPHOCYTES 1.0 0.8 - 3.5 K/UL    ABS. MONOCYTES 0.2 0.0 - 1.0 K/UL    ABS. EOSINOPHILS 0.0 0.0 - 0.4 K/UL    ABS. BASOPHILS 0.0 0.0 - 0.1 K/UL    ABS. IMM.  GRANS. 0.0 0.00 - 0.04 K/UL    DF AUTOMATED     METABOLIC PANEL, COMPREHENSIVE    Collection Time: 01/11/22  9:02 AM   Result Value Ref Range    Sodium 134 (L) 136 - 145 mmol/L    Potassium 3.4 (L) 3.5 - 5.1 mmol/L    Chloride 101 97 - 108 mmol/L    CO2 26 21 - 32 mmol/L Anion gap 7 5 - 15 mmol/L    Glucose 139 (H) 65 - 100 mg/dL    BUN 19 6 - 20 mg/dL    Creatinine 1.16 (H) 0.55 - 1.02 mg/dL    BUN/Creatinine ratio 16 12 - 20      GFR est AA 54 (L) >60 ml/min/1.73m2    GFR est non-AA 45 (L) >60 ml/min/1.73m2    Calcium 9.2 8.5 - 10.1 mg/dL    Bilirubin, total 0.6 0.2 - 1.0 mg/dL    AST (SGOT) 36 15 - 37 U/L    ALT (SGPT) 31 12 - 78 U/L    Alk. phosphatase 57 45 - 117 U/L    Protein, total 8.2 6.4 - 8.2 g/dL    Albumin 3.7 3.5 - 5.0 g/dL    Globulin 4.5 (H) 2.0 - 4.0 g/dL    A-G Ratio 0.8 (L) 1.1 - 2.2         XR CHEST PORT   Final Result   Left lung base atelectasis/airspace disease and/or effusion. These   may be chronic findings as a similar abnormality was present on the patient's   prior chest CT. Adonis Vasquez Active Problems:    Shortness of breath (1/10/2022)      Elevated troponin (1/10/2022)      COPD (chronic obstructive pulmonary disease) ([facility]) (1/11/2022)        Assessment/Plan:     1. Acute COPD exacerbation  - Chest x-ray showing left lung base atelectasis/airspace disease, could be chronic findings as similar abnormality was present on prior CT chest  - Continue IV solu-medrol  - Will start Zithromax prophylactically  - Continue Symbicort inhaler and scheduled duonebs  - Continue supplemental oxygen to maintain saturations >90%: currently on room air    2. Elevated troponin  3. NSTEMI II  - Likely from hypoxia  - Troponin trend 33 -> 140 -> 370. Continue to trend. - Cardiac monitoring  - Cardiology consult    4. Hx lung cancer  - Follows with Dr. Yoan Noble. Per patient, in remission not on any chemo/rad therapy. 5. Hypothyroidism - continue Synthroid    6. On anticoagulation w/ Eliquis - possible from prior DVT      DVT Prophylaxis: Eliquis  Code Status: Full  POA:    Discharge Barriers: Pending respiratory improvement. Cardiology consult. Troponin trend.   Care Plan discussed with: patient, nursing, IDR team. Kirill Ann to call daughter, Jo Ann Galindo, at [phone] and left voicemail to call back. Total time spent with patient: >35 minutes.

## 2022-01-11 NOTE — H&P
History and Physical    Subjective:   Chief Complaint : shortness of breath since 1 day                               Increased tropnin  Source of information : patient     History of present illness:     81F, very pleasant, COPD, lung cancer follows Dr. Delaney Frazier, with shortness of breath since 1 day    Daughter took her to [facility] she became short of breath worsening since 1 day, but symptoms going on for 3-4 days, nebulizations were given but no relief    FSER: wheeze, saturating In 90s, received nebulization treatment , symbicort and magnesium and symptoms resolved. Her troponin were elevated and was sent here    XR chest showed: Left lung base atelectasis/airspace disease and/or effusion. These  may be chronic findings as a similar abnormality was present on the patient's  prior chest CT    Past Medical History:   Diagnosis Date    [facility] obstructive pulmonary disease ([facility] [address]     Hypertension     Lung cancer ([facility] [address]      History reviewed. No pertinent surgical history. History reviewed. No pertinent family history. Social History     Tobacco Use    Smoking status: Never Smoker    Smokeless tobacco: Never Used   Substance Use Topics    Alcohol use: Not Currently       Prior to Admission medications    Medication Sig Start Date End Date Taking? Authorizing Provider   lisinopriL (PRINIVIL, ZESTRIL) 5 mg tablet Take 5 mg by mouth daily. Indications: high blood pressure   Yes Provider, Historical   levothyroxine (Synthroid) 88 mcg tablet Take 88 mcg by mouth Daily (before breakfast). 1 pill daily except on sundays   Yes Provider, Historical     No Known Allergies          Review of Systems:  Review of Systems   Constitutional: Negative. Negative for appetite change, chills, fatigue and fever. HENT: Negative. Negative for congestion and sinus pain. Eyes: Negative. Negative for pain and visual disturbance. Respiratory: Positive for shortness of breath. Negative for chest tightness. Cardiovascular: Negative. Negative for chest pain. Gastrointestinal: Negative. Negative for abdominal pain, diarrhea, nausea and vomiting. Genitourinary: Negative. Negative for difficulty urinating. No discharge   Musculoskeletal: Negative. Negative for arthralgias. Skin: Negative. Negative for rash. Neurological: Negative. Negative for weakness and headaches. Hematological: Negative. Psychiatric/Behavioral: Negative. Negative for agitation. The patient is not nervous/anxious. All other systems reviewed and are negative. Vitals:     Visit Vitals  BP (!) 142/78   Pulse 88   Temp 97.6 °F (36.4 °C)   Resp 18   Ht 5' (1.524 m)   Wt 54.4 kg (120 lb)   SpO2 98%   BMI 23.44 kg/m²       Physical Exam:   Physical Exam  Vitals and nursing note reviewed. Constitutional:       General: She is not in acute distress. Appearance: She is well-developed. HENT:      Head: Normocephalic and atraumatic. Nose: Nose normal.      Mouth/Throat:      Mouth: Mucous membranes are moist.      Pharynx: Oropharynx is clear. No oropharyngeal exudate. Eyes:      General:         Right eye: No discharge. Left eye: No discharge. Conjunctiva/sclera: Conjunctivae normal.      Pupils: Pupils are equal, round, and reactive to light. Cardiovascular:      Rate and Rhythm: Regular rhythm. Tachycardia present. Chest Wall: PMI is not displaced. No thrill. Heart sounds: Normal heart sounds. No murmur heard. No friction rub. No gallop. Pulmonary:      Effort: Pulmonary effort is normal. Tachypnea present. No respiratory distress. Breath sounds: Examination of the right-upper field reveals wheezing. Examination of the left-upper field reveals wheezing. Examination of the right-middle field reveals wheezing. Examination of the left-middle field reveals wheezing. Examination of the right-lower field reveals wheezing. Examination of the left-lower field reveals wheezing.  Wheezing present. No rales. Chest:      Chest wall: No tenderness. Abdominal:      General: Bowel sounds are normal. There is no distension. Palpations: Abdomen is soft. There is no mass. Tenderness: There is no abdominal tenderness. There is no guarding or rebound. Musculoskeletal:         General: Normal range of motion. Cervical back: Normal range of motion and neck supple. Lymphadenopathy:      Cervical: No cervical adenopathy. Skin:     General: Skin is warm and dry. Capillary Refill: Capillary refill takes less than 2 seconds. Findings: No erythema or rash. Neurological:      Mental Status: She is alert and oriented to person, place, and time. Cranial Nerves: No cranial nerve deficit. Coordination: Coordination normal.   Psychiatric:         Mood and Affect: Mood normal.         Behavior: Behavior normal.       Data Review:   Recent Results (from the past 24 hour(s))   CBC WITH AUTOMATED DIFF    Collection Time: 01/10/22  6:15 AM   Result Value Ref Range    WBC 6.7 3.6 - 11.0 K/uL    RBC 4.03 3.80 - 5.20 M/uL    HGB 12.6 11.5 - 16.0 g/dL    HCT 37.5 35.0 - 47.0 %    MCV 93.1 80.0 - 99.0 FL    MCH 31.3 26.0 - 34.0 PG    MCHC 33.6 30.0 - 36.5 g/dL    RDW 12.6 11.5 - 14.5 %    PLATELET 598 720 - 182 K/uL    MPV 9.8 8.9 - 12.9 FL    NEUTROPHILS 64 32 - 75 %    LYMPHOCYTES 29 12 - 49 %    MONOCYTES 7 5 - 13 %    EOSINOPHILS 0 0 - 7 %    BASOPHILS 0 0 - 1 %    IMMATURE GRANULOCYTES 0 0.0 - 0.5 %    ABS. NEUTROPHILS 4.3 1.8 - 8.0 K/UL    ABS. LYMPHOCYTES 1.9 0.8 - 3.5 K/UL    ABS. MONOCYTES 0.5 0.0 - 1.0 K/UL    ABS. EOSINOPHILS 0.0 0.0 - 0.4 K/UL    ABS. BASOPHILS 0.0 0.0 - 0.1 K/UL    ABS. IMM.  GRANS. 0.0 0.00 - 0.04 K/UL    DF AUTOMATED     METABOLIC PANEL, COMPREHENSIVE    Collection Time: 01/10/22  6:15 AM   Result Value Ref Range    Sodium 138 136 - 145 mmol/L    Potassium 3.5 3.5 - 5.1 mmol/L    Chloride 104 97 - 108 mmol/L    CO2 20 (L) 21 - 32 mmol/L    Anion gap 14 5 - 15 mmol/L    Glucose 118 (H) 65 - 100 mg/dL    BUN 14 6 - 20 mg/dL    Creatinine 0.92 0.55 - 1.02 mg/dL    BUN/Creatinine ratio 15 12 - 20      GFR est AA >60 >60 ml/min/1.73m2    GFR est non-AA 59 (L) >60 ml/min/1.73m2    Calcium 7.8 (L) 8.5 - 10.1 mg/dL    Bilirubin, total 0.7 0.2 - 1.0 mg/dL    AST (SGOT) 29 15 - 37 U/L    ALT (SGPT) 24 12 - 78 U/L    Alk.  phosphatase 52 45 - 117 U/L    Protein, total 6.5 6.4 - 8.2 g/dL    Albumin 3.2 (L) 3.5 - 5.0 g/dL    Globulin 3.3 2.0 - 4.0 g/dL    A-G Ratio 1.0 (L) 1.1 - 2.2     MAGNESIUM    Collection Time: 01/10/22  6:15 AM   Result Value Ref Range    Magnesium 1.7 1.6 - 2.4 mg/dL   PROTHROMBIN TIME + INR    Collection Time: 01/10/22  6:15 AM   Result Value Ref Range    Prothrombin time 12.7 (H) 9.0 - 11.1 sec    INR 1.3 (H) 0.9 - 1.1     TROPONIN-HIGH SENSITIVITY    Collection Time: 01/10/22  6:15 AM   Result Value Ref Range    Troponin-High Sensitivity 33 0 - 51 ng/L   SARS-COV-2    Collection Time: 01/10/22  6:15 AM   Result Value Ref Range    SARS-CoV-2 Please find results under separate order     SARS-COV-2    Collection Time: 01/10/22  6:15 AM   Result Value Ref Range    Specimen source Please find results under separate order      SARS-CoV-2 Not detected Not detected   EKG, 12 LEAD, INITIAL    Collection Time: 01/10/22  6:29 AM   Result Value Ref Range    Ventricular Rate 115 BPM    Atrial Rate 115 BPM    P-R Interval 140 ms    QRS Duration 124 ms    Q-T Interval 368 ms    QTC Calculation (Bezet) 509 ms    Calculated P Axis 84 degrees    Calculated R Axis 34 degrees    Calculated T Axis 82 degrees    Diagnosis       Sinus tachycardia  Non-specific intra-ventricular conduction delay  ST & T wave abnormality, consider lateral ischemia  Abnormal ECG  No previous ECGs available  Confirmed by Zenaida CRAIN MD ([id number]) on 1/10/2022 11:44:08 AM     TROPONIN-HIGH SENSITIVITY    Collection Time: 01/10/22  8:30 AM   Result Value Ref Range    Troponin-High Sensitivity 140 (HH) 0 - 51 ng/L   TROPONIN-HIGH SENSITIVITY    Collection Time: 01/10/22  6:00 PM   Result Value Ref Range    Troponin-High Sensitivity 370 (HH) 0 - 51 ng/L   EKG, 12 LEAD, SUBSEQUENT    Collection Time: 01/10/22  6:06 PM   Result Value Ref Range    Ventricular Rate 94 BPM    Atrial Rate 94 BPM    P-R Interval 146 ms    QRS Duration 126 ms    Q-T Interval 402 ms    QTC Calculation (Bezet) 502 ms    Calculated P Axis 63 degrees    Calculated R Axis 42 degrees    Calculated T Axis 100 degrees    Diagnosis       Normal sinus rhythm  Non-specific intra-ventricular conduction block  Cannot rule out Anterior infarct , age undetermined  T wave abnormality, consider lateral ischemia  Abnormal ECG  When compared with ECG of 10-TAYA-2022 18:06,  No significant change was found  Confirmed by Tamanna Bonilla MD, Dhruv Dickens ([id number]) on 1/10/2022 6:38:22 PM               Assessment and Plan :     (1) AECOPD: solumedrol, duo nebs, no soource of infection likely triggered by stimuli in the setting of lung cancer    (2) lung cancer: complicates #1    (3) NSTEMI type II: ECHO, cardiology consult for input    (4) she takes eliquis for probable DVT    (5) hypothyroidism: synthroid    DVT ppx: [email or web address]    DISPO: home after cards consult    Signed By: Jeremy Hickey MD     January 11, 2022

## 2022-01-11 NOTE — PROGRESS NOTES
Reason for Admission:  Shortness of breath                     RUR Score:   9%                  Plan for utilizing home health:    ? PCP: First and Last name:  Isidra Franklin MD     Name of Practice:    Are you a current patient: Yes/No: Yes   Approximate date of last visit: 3 years   Can you participate in a virtual visit with your PCP:                     Current Advanced Directive/Advance Care Plan: Full Code      Healthcare Decision Maker:   Click here to complete [address] including selection of the [address] Relationship (ie \"Primary\")           Acosta Grider (daughter) [phone]                  Transition of Care Plan:                      Cm met with pt at the bedside to complete discharge assessment. Cm verified home address and emergency contact - Deonna Mensah (daughter). Pt reports living at home alone. Pt ambulates without DME. Pt indicated she has a nebulizer and shower chair. Pt is not current with home health. Pt indicated she has not seen her PCP - Riddhi Julian in three years. Pt indicates she has been seeing Dr. Santos Arenas, her oncologist. Pt did not have any questions or concerns for CM at this time. Cm will continue to follow.

## 2022-01-11 NOTE — ED NOTES
Assumed care of Pt at this time. Pt sitting on the side of the bed. NAD noted  Bed low and locked and call light is within reach. Pt made aware of care team and instructed Pt on how to use it.   Pt denies pain at this time

## 2022-01-12 VITALS
RESPIRATION RATE: 20 BRPM | HEIGHT: 60 IN | TEMPERATURE: 97.8 F | BODY MASS INDEX: 23.56 KG/M2 | OXYGEN SATURATION: 95 % | WEIGHT: 120 LBS | HEART RATE: 94 BPM | SYSTOLIC BLOOD PRESSURE: 133 MMHG | DIASTOLIC BLOOD PRESSURE: 76 MMHG

## 2022-01-12 LAB
ANION GAP SERPL CALC-SCNC: 7 MMOL/L (ref 5–15)
APPEARANCE UR: CLEAR
BACTERIA URNS QL MICRO: NEGATIVE /HPF
BILIRUB UR QL: NEGATIVE
BUN SERPL-MCNC: 28 MG/DL (ref 6–20)
BUN/CREAT SERPL: 28 (ref 12–20)
CA-I BLD-MCNC: 9.1 MG/DL (ref 8.5–10.1)
CHLORIDE SERPL-SCNC: 105 MMOL/L (ref 97–108)
CO2 SERPL-SCNC: 24 MMOL/L (ref 21–32)
COLOR UR: NORMAL
CREAT SERPL-MCNC: 1.01 MG/DL (ref 0.55–1.02)
ERYTHROCYTE [DISTWIDTH] IN BLOOD BY AUTOMATED COUNT: 12.6 % (ref 11.5–14.5)
GLUCOSE SERPL-MCNC: 127 MG/DL (ref 65–100)
GLUCOSE UR STRIP.AUTO-MCNC: NEGATIVE MG/DL
HCT VFR BLD AUTO: 35 % (ref 35–47)
HGB BLD-MCNC: 11.8 G/DL (ref 11.5–16)
HGB UR QL STRIP: NEGATIVE
HYALINE CASTS URNS QL MICRO: NORMAL /LPF (ref 0–5)
KETONES UR QL STRIP.AUTO: NEGATIVE MG/DL
LEUKOCYTE ESTERASE UR QL STRIP.AUTO: NEGATIVE
MCH RBC QN AUTO: 30.6 PG (ref 26–34)
MCHC RBC AUTO-ENTMCNC: 33.7 G/DL (ref 30–36.5)
MCV RBC AUTO: 90.7 FL (ref 80–99)
NITRITE UR QL STRIP.AUTO: NEGATIVE
NRBC # BLD: 0 K/UL (ref 0–0.01)
NRBC BLD-RTO: 0 PER 100 WBC
PH UR STRIP: 6 [PH] (ref 5–8)
PLATELET # BLD AUTO: 186 K/UL (ref 150–400)
PMV BLD AUTO: 10.9 FL (ref 8.9–12.9)
POTASSIUM SERPL-SCNC: 4 MMOL/L (ref 3.5–5.1)
PROT UR STRIP-MCNC: NEGATIVE MG/DL
RBC # BLD AUTO: 3.86 M/UL (ref 3.8–5.2)
RBC #/AREA URNS HPF: NORMAL /HPF (ref 0–5)
SODIUM SERPL-SCNC: 136 MMOL/L (ref 136–145)
SP GR UR REFRACTOMETRY: 1.01 (ref 1–1.03)
TSH SERPL DL<=0.05 MIU/L-ACNC: 0.87 UIU/ML (ref 0.36–3.74)
UA: UC IF INDICATED,UAUC: NORMAL
UROBILINOGEN UR QL STRIP.AUTO: 0.1 EU/DL (ref 0.1–1)
WBC # BLD AUTO: 7.4 K/UL (ref 3.6–11)
WBC URNS QL MICRO: NORMAL /HPF (ref 0–4)

## 2022-01-12 PROCEDURE — 85027 COMPLETE CBC AUTOMATED: CPT

## 2022-01-12 PROCEDURE — 36415 COLL VENOUS BLD VENIPUNCTURE: CPT

## 2022-01-12 PROCEDURE — 74011250636 HC RX REV CODE- 250/636: Performed by: HOSPITALIST

## 2022-01-12 PROCEDURE — 74011250637 HC RX REV CODE- 250/637: Performed by: EMERGENCY MEDICINE

## 2022-01-12 PROCEDURE — 84443 ASSAY THYROID STIM HORMONE: CPT

## 2022-01-12 PROCEDURE — 94640 AIRWAY INHALATION TREATMENT: CPT

## 2022-01-12 PROCEDURE — 74011636637 HC RX REV CODE- 636/637: Performed by: STUDENT IN AN ORGANIZED HEALTH CARE EDUCATION/TRAINING PROGRAM

## 2022-01-12 PROCEDURE — 80048 BASIC METABOLIC PNL TOTAL CA: CPT

## 2022-01-12 PROCEDURE — 81001 URINALYSIS AUTO W/SCOPE: CPT

## 2022-01-12 PROCEDURE — 74011000250 HC RX REV CODE- 250: Performed by: HOSPITALIST

## 2022-01-12 PROCEDURE — 74011250637 HC RX REV CODE- 250/637: Performed by: STUDENT IN AN ORGANIZED HEALTH CARE EDUCATION/TRAINING PROGRAM

## 2022-01-12 RX ORDER — PREDNISONE 20 MG/1
40 TABLET ORAL
Status: DISCONTINUED | OUTPATIENT
Start: 2022-01-12 | End: 2022-01-12 | Stop reason: HOSPADM

## 2022-01-12 RX ORDER — POTASSIUM CHLORIDE 1500 MG/1
20 TABLET, FILM COATED, EXTENDED RELEASE ORAL DAILY
Qty: 30 TABLET | Refills: 1 | Status: SHIPPED | OUTPATIENT
Start: 2022-01-13 | End: 2022-03-14

## 2022-01-12 RX ORDER — NEBULIZER AND COMPRESSOR
1 EACH MISCELLANEOUS
Qty: 1 EACH | Refills: 0 | Status: SHIPPED | OUTPATIENT
Start: 2022-01-12

## 2022-01-12 RX ORDER — BUDESONIDE AND FORMOTEROL FUMARATE DIHYDRATE 160; 4.5 UG/1; UG/1
2 AEROSOL RESPIRATORY (INHALATION) 2 TIMES DAILY
Qty: 10.2 G | Refills: 1 | Status: SHIPPED | OUTPATIENT
Start: 2022-01-12

## 2022-01-12 RX ORDER — AZITHROMYCIN 500 MG/1
500 TABLET, FILM COATED ORAL DAILY
Qty: 3 TABLET | Refills: 0 | Status: SHIPPED | OUTPATIENT
Start: 2022-01-13 | End: 2022-01-16

## 2022-01-12 RX ORDER — IPRATROPIUM BROMIDE AND ALBUTEROL SULFATE 2.5; .5 MG/3ML; MG/3ML
3 SOLUTION RESPIRATORY (INHALATION)
Qty: 30 NEBULE | Refills: 0 | Status: SHIPPED | OUTPATIENT
Start: 2022-01-12

## 2022-01-12 RX ORDER — PREDNISONE 10 MG/1
TABLET ORAL
Qty: 15 TABLET | Refills: 0 | Status: SHIPPED | OUTPATIENT
Start: 2022-01-12 | End: 2022-01-19

## 2022-01-12 RX ADMIN — LISINOPRIL 5 MG: 10 TABLET ORAL at 08:00

## 2022-01-12 RX ADMIN — POTASSIUM CHLORIDE 20 MEQ: 750 TABLET, FILM COATED, EXTENDED RELEASE ORAL at 08:02

## 2022-01-12 RX ADMIN — AZITHROMYCIN MONOHYDRATE 500 MG: 500 TABLET ORAL at 08:02

## 2022-01-12 RX ADMIN — SODIUM CHLORIDE, PRESERVATIVE FREE 10 ML: 5 INJECTION INTRAVENOUS at 05:17

## 2022-01-12 RX ADMIN — APIXABAN 2.5 MG: 5 TABLET, FILM COATED ORAL at 08:16

## 2022-01-12 RX ADMIN — SODIUM CHLORIDE, PRESERVATIVE FREE 10 ML: 5 INJECTION INTRAVENOUS at 13:49

## 2022-01-12 RX ADMIN — PREDNISONE 40 MG: 20 TABLET ORAL at 11:17

## 2022-01-12 RX ADMIN — METHYLPREDNISOLONE SODIUM SUCCINATE 40 MG: 40 INJECTION, POWDER, FOR SOLUTION INTRAMUSCULAR; INTRAVENOUS at 05:16

## 2022-01-12 RX ADMIN — LEVOTHYROXINE SODIUM 88 MCG: 0.09 TABLET ORAL at 05:16

## 2022-01-12 NOTE — PROGRESS NOTES
Hospitalist Progress Note    Subjective:   Daily Progress Note: 1/12/2022 10:56 AM    Hospital Course:  Naina Howe is an 43-year-old female with a PMHx of COPD, lung cancer follows with Dr. Chris Krabbe, and hypertension who was brought in by her daughter to the ED with a primary complaint of worsening shortness of breath x1 day. Daughter initially took the patient to the [address] [place] [address]. In the freestanding ED, patient was noted to have significant wheezing with SpO2 in 90s. She received 3-4 nebulizer treatments, magnesium, and Symbicort inhaler without significant relief. Also noted to have elevated troponin on labs. Chest x-ray showing left lung base atelectasis/airspace disease, could be chronic findings as similar abnormality was present on prior CT chest. Transferred to [facility] for further care and admitted for acute respiratory failure secondary to COPD exacerbation and troponin leak. Started on IV solu-medrol and Zithromax prophylactically. Cardiology consult. Echo LVEF 50-55%. Subjective:    Patient seen and examined. Daughter at bedside. Nursing reports patient had episode of \"sundowning\" last night. She was wandering the hallways trying to go home. Apparently this is not normal at baseline per daughter. Will get urinalysis to r/o UTI.      Current Facility-Administered Medications   Medication Dose Route Frequency    predniSONE (DELTASONE) tablet 40 mg  40 mg Oral DAILY WITH BREAKFAST    albuterol-ipratropium (DUO-NEB) 2.5 MG-0.5 MG/3 ML  3 mL Nebulization Q4H PRN    budesonide-formoteroL (SYMBICORT) 160-4.5 mcg/actuation HFA inhaler 2 Puff  2 Puff Inhalation BID RT    sodium chloride (NS) flush 5-40 mL  5-40 mL IntraVENous Q8H    sodium chloride (NS) flush 5-40 mL  5-40 mL IntraVENous PRN    acetaminophen (TYLENOL) tablet 650 mg  650 mg Oral Q6H PRN    Or    acetaminophen (TYLENOL) suppository 650 mg  650 mg Rectal Q6H PRN    polyethylene glycol (MIRALAX) packet 17 g  17 g Oral DAILY PRN    ondansetron (ZOFRAN ODT) tablet 4 mg  4 mg Oral Q8H PRN    Or    ondansetron (ZOFRAN) injection 4 mg  4 mg IntraVENous Q6H PRN    potassium chloride SR (KLOR-CON 10) tablet 20 mEq  20 mEq Oral DAILY    azithromycin (ZITHROMAX) tablet 500 mg  500 mg Oral DAILY    sodium chloride (NS) flush 5-40 mL  5-40 mL IntraVENous Q8H    sodium chloride (NS) flush 5-40 mL  5-40 mL IntraVENous PRN    apixaban (ELIQUIS) tablet 2.5 mg  2.5 mg Oral BID    levothyroxine (SYNTHROID) tablet 88 mcg  88 mcg Oral 6am    lisinopriL (PRINIVIL, ZESTRIL) tablet 5 mg  5 mg Oral DAILY        Review of Systems  Constitutional: No fevers, No chills, No sweats, No fatigue, No Weakness  Eyes: No redness  ENT: No nasal congestion, No sore throat, No voice change  Respiratory: + Shortness of Breath, + wheezing, No cough  Cardiovascular: No chest pain, No palpitations, No extremity edema  Gastrointestinal: No nausea, No vomiting, No diarrhea, No abdominal pain  Genitourinary: No frequency, No dysuria, No hematuria  Integument/breast: No skin lesion(s)   Neurological: No Confusion, No headaches, No dizziness      Objective:     Visit Vitals  /76   Pulse 94   Temp 97.8 °F (36.6 °C)   Resp 20   Ht 5' (1.524 m)   Wt 54.4 kg (120 lb)   SpO2 95%   BMI 23.44 kg/m²      O2 Device: None (Room air)    Temp (24hrs), Av.9 °F (36.6 °C), Min:97.8 °F (36.6 °C), Max:98 °F (36.7 °C)      No intake/output data recorded. No intake/output data recorded. PHYSICAL EXAM:  Constitutional: No acute distress  Skin: Extremities and face reveal no rashes. HEENT: Sclerae anicteric. PERRL. No oral ulcers. The neck is supple and no masses. Cardiovascular: Regular rate and rhythm. +S1/S2. No murmur or gallop. Respiratory:  Symmetric chest wall expansion. Decreased air entry bases with expiratory wheezing bilaterally. No rhonchi or rales. On room air. GI: Abdomen nondistended, soft, and nontender. Normal active bowel sounds.   Rectal: Deferred Musculoskeletal: No pitting edema of the lower legs. Able to move all ext  Neurological:  Patient is alert and oriented.  Cranial nerves II-XII grossly intact  Psychiatric: Mood appears appropriate       Data Review    Recent Results (from the past 24 hour(s))   ECHO ADULT COMPLETE    Collection Time: 01/11/22  2:30 PM   Result Value Ref Range    LV EDV A2C 51 mL    LV EDV A4C 53 mL    LV EDV BP 54 (A) 56 - 104 mL    LV ESV A2C 21 mL    LV ESV A4C 22 mL    IVSd 0.7 0.6 - 0.9 cm    LVIDd 3.5 (A) 3.9 - 5.3 cm    LVOT Diameter 1.8 cm    LVOT Mean Gradient 3 mmHg    LVOT VTI 23.8 cm    LVOT Peak Velocity 1.3 m/s    LVOT Peak Gradient 7 mmHg    LVPWd 0.7 0.6 - 0.9 cm    LV E' Lateral Velocity 5 cm/s    LV E' Septal Velocity 6 cm/s    LV Ejection Fraction A2C 59 %    LV Ejection Fraction A4C 58 %    EF BP 59 55 - 100 %    LVOT Area 2.5 cm2    LVOT SV 60.5 ml    LA Minor Axis 3.5 cm    LA Major Axis 3.4 cm    LA Area 2C 10.3 cm2    LA Area 4C 8.2 cm2    LA Volume BP 19 (A) 22 - 52 mL    LA Diameter 2.3 cm    AV Mean Gradient 5 mmHg    AV VTI 23.3 cm    AV Mean Velocity 1.0 m/s    AV Peak Velocity 1.5 m/s    AV Peak Gradient 8 mmHg    AV Area by VTI 2.6 cm2    AV Area by Peak Velocity 2.2 cm2    Aortic Root 2.9 cm    Ascending Aorta 2.7 cm    MR VTI 42.9 cm    MV Mean Gradient 6 mmHg    MV VTI 33.3 cm    MV Mean Velocity 1.1 m/s    MR Peak Velocity 2.8 m/s    MR Peak Gradient 31 mmHg    MV Max Velocity 2.0 m/s    MV Peak Gradient 15 mmHg    MV A Velocity 1.66 m/s    MV E Velocity 1.31 m/s    MV Area by VTI 1.8 cm2    MA Max Velocity 1.7 m/s    Pulmonary Artery EDP 11 mmHg    PV Mean Gradient 5 mmHg    PV VTI 25.6 cm    PV Mean Velocity 1.0 m/s    PV Max Velocity 1.5 m/s    PV Peak Gradient 9 mmHg    Pulm Vein Peak D Velocity 0.7 m/s    Pulm Vein Peak S Velocity 1.0 m/s    Pulm Vein S/D 1.4 no units    TR Max Velocity 1.87 m/s    TR Peak Gradient 14 mmHg    TAPSE 1.8 1.5 - 2.0 cm    LV EDV Index BP 36 mL/m2    LV ESV Index A4C 15 mL/m2    LV EDV Index A4C 35 mL/m2    LV ESV Index A2C 14 mL/m2    LV EDV Index A2C 34 mL/m2    LVIDd Index 2.33 cm/m2    LV RWT Ratio 0.40     LV Mass 2D 62.8 (A) 67 - 162 g    LV Mass 2D Index 41.9 (A) 43 - 95 g/m2    MV E/A 0.79     E/E' Ratio (Averaged) 24.02     E/E' Lateral 26.20     E/E' Septal 21.83     LA Volume Index BP 13 (A) 16 - 34 ml/m2    LVOT Stroke Volume Index 40.4 mL/m2    LA Size Index 1.53 cm/m2    LA/AO Root Ratio 0.79     Ao Root Index 1.93 cm/m2    Ascending Aorta Index 1.80 cm/m2    AV Velocity Ratio 0.87     LVOT:AV VTI Index 1.02     CEASAR/BSA VTI 1.7 cm2/m2    CEASAR/BSA Peak Velocity 1.5 cm2/m2    MV:LVOT VTI Index 1.40     RV Basal Dimension 2.9 cm    RV Mid Dimension 2.0 cm   CBC W/O DIFF    Collection Time: 01/12/22  7:20 AM   Result Value Ref Range    WBC 7.4 3.6 - 11.0 K/uL    RBC 3.86 3.80 - 5.20 M/uL    HGB 11.8 11.5 - 16.0 g/dL    HCT 35.0 35.0 - 47.0 %    MCV 90.7 80.0 - 99.0 FL    MCH 30.6 26.0 - 34.0 PG    MCHC 33.7 30.0 - 36.5 g/dL    RDW 12.6 11.5 - 14.5 %    PLATELET 141 004 - 800 K/uL    MPV 10.9 8.9 - 12.9 FL    NRBC 0.0 0.0  WBC    ABSOLUTE NRBC 0.00 0.00 - 1.87 K/uL   METABOLIC PANEL, BASIC    Collection Time: 01/12/22  7:20 AM   Result Value Ref Range    Sodium 136 136 - 145 mmol/L    Potassium 4.0 3.5 - 5.1 mmol/L    Chloride 105 97 - 108 mmol/L    CO2 24 21 - 32 mmol/L    Anion gap 7 5 - 15 mmol/L    Glucose 127 (H) 65 - 100 mg/dL    BUN 28 (H) 6 - 20 mg/dL    Creatinine 1.01 0.55 - 1.02 mg/dL    BUN/Creatinine ratio 28 (H) 12 - 20      GFR est AA >60 >60 ml/min/1.73m2    GFR est non-AA 53 (L) >60 ml/min/1.73m2    Calcium 9.1 8.5 - 10.1 mg/dL   TSH 3RD GENERATION    Collection Time: 01/12/22  7:20 AM   Result Value Ref Range    TSH 0.87 0.36 - 3.74 uIU/mL       XR CHEST PORT   Final Result   Left lung base atelectasis/airspace disease and/or effusion. These   may be chronic findings as a similar abnormality was present on the patient's   prior chest CT. Trula Blew Active Problems:    Shortness of breath (1/10/2022)      Elevated troponin (1/10/2022)      COPD (chronic obstructive pulmonary disease) ([facility] (1/11/2022)        Assessment/Plan:     1. Acute COPD exacerbation  - Chest x-ray showing left lung base atelectasis/airspace disease, could be chronic findings as similar abnormality was present on prior CT chest  - Continue IV solu-medrol  - Will start Zithromax prophylactically  - Continue Symbicort inhaler and scheduled duonebs  - Continue supplemental oxygen to maintain saturations >90%: currently on room air    2. Elevated troponin  3. NSTEMI II  - Likely from hypoxia  - Troponin trend 33 -> 140 -> 370 -> 273. Continue to trend. - Cardiac monitoring  - Cardiology consult    4. Hx lung cancer  - Follows with Dr. Yoan Noble. Per patient, in remission not on any chemo/rad therapy. 5. Hypothyroidism - continue Synthroid    6. On anticoagulation w/ Eliquis - possible from prior DVT    7. Confusion - likely baseline dementia. Will get UA to r/o UTI. DVT Prophylaxis: Eliquis  Code Status: Full  POA:    Discharge Barriers: Pending cardiology consult. Urinalysis pending. Care Plan discussed with: patient, nursing, IDR team. Daughter, Jo Ann Galindo, at bedside. Total time spent with patient: >35 minutes.

## 2022-01-12 NOTE — PROGRESS NOTES
I have reviewed discharge instructions with the patient and patient's daughter. The patient and daughter verbalized understanding.

## 2022-01-12 NOTE — DISCHARGE SUMMARY
Hospitalist Discharge Summary     Patient ID:    Haley Jaime  [id number]  45 y.o.  1940    Admit date: 1/10/2022    Discharge date : 1/12/2022    Chronic Diagnoses:    Problem List as of 1/12/2022 Never Reviewed          Codes Class Noted - Resolved    COPD (chronic obstructive pulmonary disease) ([facility] ICD-10-CM: J44.9  ICD-9-CM: 992  1/11/2022 - Present        * (Principal) Shortness of breath ICD-10-CM: R06.02  ICD-9-CM: 786.05  1/10/2022 - Present        Elevated troponin ICD-10-CM: R77.8  ICD-9-CM: 790.6  1/10/2022 - Present          22    Final Diagnoses:   Principal Problem:    Shortness of breath (1/10/2022)    Active Problems:    Elevated troponin (1/10/2022)      COPD (chronic obstructive pulmonary disease) ([facility] (1/11/2022)        Hospital Course:   Mathew Nova is an 68-year-old female with a PMHx of COPD, lung cancer follows with Dr. Matthias Forrest, and hypertension who was brought in by her daughter to the ED with a primary complaint of worsening shortness of breath x1 day. Daughter initially took the patient to the [address] [place] [address]. In the freestanding ED, patient was noted to have significant wheezing with SpO2 in 90s. She received 3-4 nebulizer treatments, magnesium, and Symbicort inhaler without significant relief. Also noted to have elevated troponin on labs. Chest x-ray showing left lung base atelectasis/airspace disease, could be chronic findings as similar abnormality was present on prior CT chest. Transferred to [address] [place] [address] for further care and admitted for acute respiratory failure secondary to COPD exacerbation and troponin leak. Started on IV solu-medrol and Zithromax prophylactically. Cardiology consult. Troponin trending down. Likely NSTEMI II from borderline hypoxia. Echo LVEF 50-55%. Wheezing resolved. Remained on room air throughout admission. Vitals and labs stable.      Discharge Medications:   Current Discharge Medication List      START taking these medications Details   albuterol-ipratropium (DUO-NEB) 2.5 mg-0.5 mg/3 ml nebu 3 mL by Nebulization route every four (4) hours as needed for Wheezing. Qty: 30 Nebule, Refills: 0  Start date: 1/12/2022      Nebulizer & Compressor machine 1 Each by Does Not Apply route four (4) times daily as needed for Wheezing or Shortness of Breath. Qty: 1 Each, Refills: 0  Start date: 1/12/2022      apixaban (ELIQUIS) 2.5 mg tablet Take 1 Tablet by mouth two (2) times a day for 30 days. Qty: 60 Tablet, Refills: 0  Start date: 1/12/2022, End date: 2/11/2022      azithromycin (ZITHROMAX) 500 mg tab Take 1 Tablet by mouth daily for 3 doses. Qty: 3 Tablet, Refills: 0  Start date: 1/13/2022, End date: 1/16/2022      budesonide-formoteroL (SYMBICORT) 160-4.5 mcg/actuation HFAA Take 2 Puffs by inhalation two (2) times a day. Qty: 10.2 g, Refills: 1  Start date: 1/12/2022      potassium chloride SR (K-TAB) 20 mEq tablet Take 1 Tablet by mouth daily for 60 days. Qty: 30 Tablet, Refills: 1  Start date: 1/13/2022, End date: 3/14/2022      predniSONE (DELTASONE) 10 mg tablet Take 20 mg by mouth two (2) times a day for 2 days, THEN 10 mg two (2) times a day for 2 days, THEN 10 mg daily for 3 days. Qty: 15 Tablet, Refills: 0  Start date: 1/12/2022, End date: 1/19/2022         CONTINUE these medications which have NOT CHANGED    Details   lisinopriL (PRINIVIL, ZESTRIL) 5 mg tablet Take 5 mg by mouth daily. Indications: high blood pressure      levothyroxine (Synthroid) 88 mcg tablet Take 88 mcg by mouth Daily (before breakfast). 1 pill daily except on sundays               Follow up Care:    1. Kyle Sheppard MD in 1-2 weeks. Follow-up Information     Follow up With Specialties Details Why [address] [facility], Riddhi R, MD [facility] Medicine Schedule an appointment as soon as possible for a visit in 1 week Sundowning during hospital stay.  Dementia joaoal. Mickey. Michelle [address] [facility] 105  [phone]      Meryle Sees,  Pulmonary Critical Care Schedule an appointment as soon as possible for a visit in 1 week New onset COPD [address]      Rmoa Frausto, [address] [facility] Vascular Surgery, Cardiology Schedule an appointment as soon as possible for a visit in 1 week Hospital follow-up NSTEMI secondary to COPD exacerbation [address]  [phone]              Patient Follow Up Instructions: Activity: Activity as tolerated  Diet:  Resume previous diet  Wound care: none     Condition at Discharge:  Stable  __________________________________________________________________    Disposition  Home or Self Care  ____________________________________________________________________    Code Status:  Full Code  ___________________________________________________________________    Discharge Exam:  Patient seen and examined by me on discharge day. Pertinent Findings:    Gen:    Not in distress  Chest: Symmetric chest wall expansion. Decreased air entry bases. No wheezing or rhonchi. On room air. CVS:   Regular rate and rhythm. +S1/S2. No murmur or gallop. No edema  Abd:  Soft, not distended, not tender. Active bowel sounds. Skin: No rashes or lesions. Warm, dry, intact. Neuro:  Alert and oriented x3.     CONSULTATIONS: None    Significant Diagnostic Studies:   Recent Results (from the past 24 hour(s))   CBC W/O DIFF    Collection Time: 01/12/22  7:20 AM   Result Value Ref Range    WBC 7.4 3.6 - 11.0 K/uL    RBC 3.86 3.80 - 5.20 M/uL    HGB 11.8 11.5 - 16.0 g/dL    HCT 35.0 35.0 - 47.0 %    MCV 90.7 80.0 - 99.0 FL    MCH 30.6 26.0 - 34.0 PG    MCHC 33.7 30.0 - 36.5 g/dL    RDW 12.6 11.5 - 14.5 %    PLATELET 005 809 - 954 K/uL    MPV 10.9 8.9 - 12.9 FL    NRBC 0.0 0.0  WBC    ABSOLUTE NRBC 0.00 0.00 - 2.14 K/uL   METABOLIC PANEL, BASIC    Collection Time: 01/12/22  7:20 AM   Result Value Ref Range    Sodium 136 136 - 145 mmol/L    Potassium 4.0 3.5 - 5.1 mmol/L    Chloride 105 97 - 108 mmol/L    CO2 24 21 - 32 mmol/L    Anion gap 7 5 - 15 mmol/L    Glucose 127 (H) 65 - 100 mg/dL    BUN 28 (H) 6 - 20 mg/dL    Creatinine 1.01 0.55 - 1.02 mg/dL    BUN/Creatinine ratio 28 (H) 12 - 20      GFR est AA >60 >60 ml/min/1.73m2    GFR est non-AA 53 (L) >60 ml/min/1.73m2    Calcium 9.1 8.5 - 10.1 mg/dL   TSH 3RD GENERATION    Collection Time: 01/12/22  7:20 AM   Result Value Ref Range    TSH 0.87 0.36 - 3.74 uIU/mL   URINALYSIS W/ REFLEX CULTURE    Collection Time: 01/12/22  9:30 AM    Specimen: Urine   Result Value Ref Range    Color Yellow/Straw      Appearance Clear Clear      Specific gravity 1.008 1.003 - 1.030      pH (UA) 6.0 5.0 - 8.0      Protein Negative Negative mg/dL    Glucose Negative Negative mg/dL    Ketone Negative Negative mg/dL    Bilirubin Negative Negative      Blood Negative Negative      Urobilinogen 0.1 0.1 - 1.0 EU/dL    Nitrites Negative Negative      Leukocyte Esterase Negative Negative      WBC 0-4 0 - 4 /hpf    RBC 0-5 0 - 5 /hpf    Bacteria Negative Negative /hpf    UA:UC IF INDICATED Culture not indicated by UA result Culture not indicated by UA result      Hyaline cast 0-2 0 - 5 /lpf     XR CHEST PORT   Final Result   Left lung base atelectasis/airspace disease and/or effusion. These   may be chronic findings as a similar abnormality was present on the patient's   prior chest CT. .               SignedDarlydia Page PA-C  1/12/2022  2:36 PM

## 2022-01-12 NOTE — PROGRESS NOTES
Chart reviewed. Discharge summary/order noted. CM telephoned patient's daughter, Gege Rebolledo [phone] to notify of discharge and offer [facility] services. Denied [facility] services at this time. Daughter relayed her mother will be staying with her for a few nights and she will pick her up this afternoon. Discharge plan of care/case management plan validated with provider discharge order.

## 2022-03-04 ENCOUNTER — OFFICE VISIT (OUTPATIENT)
Dept: ENDOCRINOLOGY | Age: 82
End: 2022-03-04
Payer: MEDICARE

## 2022-03-04 VITALS
WEIGHT: 108.7 LBS | SYSTOLIC BLOOD PRESSURE: 138 MMHG | OXYGEN SATURATION: 98 % | HEIGHT: 65 IN | HEART RATE: 97 BPM | RESPIRATION RATE: 19 BRPM | TEMPERATURE: 98 F | DIASTOLIC BLOOD PRESSURE: 65 MMHG | BODY MASS INDEX: 18.11 KG/M2

## 2022-03-04 DIAGNOSIS — E55.9 VITAMIN D DEFICIENCY: ICD-10-CM

## 2022-03-04 DIAGNOSIS — E03.9 ACQUIRED HYPOTHYROIDISM: ICD-10-CM

## 2022-03-04 DIAGNOSIS — M81.0 AGE RELATED OSTEOPOROSIS, UNSPECIFIED PATHOLOGICAL FRACTURE PRESENCE: Primary | ICD-10-CM

## 2022-03-04 PROCEDURE — G8427 DOCREV CUR MEDS BY ELIG CLIN: HCPCS | Performed by: INTERNAL MEDICINE

## 2022-03-04 PROCEDURE — 1101F PT FALLS ASSESS-DOCD LE1/YR: CPT | Performed by: INTERNAL MEDICINE

## 2022-03-04 PROCEDURE — 99204 OFFICE O/P NEW MOD 45 MIN: CPT | Performed by: INTERNAL MEDICINE

## 2022-03-04 PROCEDURE — G8419 CALC BMI OUT NRM PARAM NOF/U: HCPCS | Performed by: INTERNAL MEDICINE

## 2022-03-04 PROCEDURE — G8510 SCR DEP NEG, NO PLAN REQD: HCPCS | Performed by: INTERNAL MEDICINE

## 2022-03-04 PROCEDURE — G8536 NO DOC ELDER MAL SCRN: HCPCS | Performed by: INTERNAL MEDICINE

## 2022-03-04 PROCEDURE — 1090F PRES/ABSN URINE INCON ASSESS: CPT | Performed by: INTERNAL MEDICINE

## 2022-03-04 RX ORDER — LEVOTHYROXINE SODIUM 88 UG/1
88 TABLET ORAL
Qty: 90 TABLET | Refills: 2 | Status: SHIPPED | OUTPATIENT
Start: 2022-03-04 | End: 2022-06-02

## 2022-03-04 NOTE — PROGRESS NOTES
History and Physical    Patient: Steven Sandoval MRN: [id number]  SSN: [id number]    YOB: 1940  Age: 80 y.o. Sex: female      Subjective:      Steven Sandoval is a 80 y.o. female with past medical history of hypertension and lung cancer is sent to me by endocrinologist Dr. Erum Arreaga of [facility] for hypothyroidism and osteoporosis. She is in primary care of Dr. Adelina Perez. History was mainly obtained from patient's daughter. Patient is a poor historian because of memory issues. Hypothyroidism:  Currently she is on namebrand Synthroid 88 mcg 1 tablet every day. She has been on this dose for more than 1 year. She takes it regularly and appropriately. Appetite is good, bowel movements are regular, she denies any palpitations or tremors, sleep is okay. Weight has fluctuated recently but she has other health issues going on. Osteoporosis:  Last bone density scan was at [facility] 2 years back. A very long time back she was briefly treated with Fosamax which she did not continue. When she had bone density scan 2 years back, she was started on Reclast infusion. Received first Reclast infusion in January 2021, received second Reclast infusion in January 2022, this time it was ordered by patient's hematologist oncologist Dr. Jay Fisher. She takes calcium tablet twice a day, tries to consume 1-2 servings of dairy every day, takes vitamin D 2000 units every day. She has had a couple kidney stones in the past.  Never broken any bones. She has not had bone density scan repeated since she went on Reclast.  Does not require steroids. Does not do any structured exercise. No issues with balance. Up-to-date with dental exam, goes every 6 months. Last appointment was in October 2021. She does not have any loose or bothersome teeth. Does not smoke cigarettes.     Past Medical History:   Diagnosis Date    Chronic obstructive pulmonary disease ([facility]     Hypertension     Lung [facility] ([facility]      History reviewed. No pertinent surgical history. History reviewed. No pertinent family history. Social History     Tobacco Use    Smoking status: Never Smoker    Smokeless tobacco: Never Used   Substance Use Topics    Alcohol use: Not Currently      Prior to Admission medications    Medication Sig Start Date End Date Taking? Authorizing Provider   apixaban (ELIQUIS) 2.5 mg tablet Take 2.5 mg by mouth two (2) times a day. Yes Provider, Historical   Synthroid 88 mcg tablet Take 1 Tablet by mouth Daily (before breakfast) for 90 days. 3/4/22 6/2/22 Yes Hallie Ontiveros MD   albuterol-ipratropium (DUO-NEB) 2.5 mg-0.5 mg/3 ml nebu 3 mL by Nebulization route every four (4) hours as needed for Wheezing. 1/12/22  Yes Pillo Fabian PA-C   Nebulizer & Compressor machine 1 Each by Does Not Apply route four (4) times daily as needed for Wheezing or Shortness of Breath. 1/12/22  Yes Pillo Fabian PA-C   potassium chloride SR (K-TAB) 20 mEq tablet Take 1 Tablet by mouth daily for 60 days. 1/13/22 3/14/22 Yes Pillo Fabian PA-C   lisinopriL (PRINIVIL, ZESTRIL) 5 mg tablet Take 5 mg by mouth daily. Indications: high blood pressure   Yes Provider, Historical   budesonide-formoteroL (SYMBICORT) 160-4.5 mcg/actuation HFAA Take 2 Puffs by inhalation two (2) times a day. Patient not taking: Reported on 3/4/2022 1/12/22   Pillo Fabian PA-C        No Known Allergies    Review of Systems:  ROS    A comprehensive review of systems was preformed and it is negative except mentioned in HPI    Objective:     Vitals:    03/04/22 1415   BP: 138/65   Pulse: 97   Resp: 19   Temp: 98 °F (36.7 °C)   TempSrc: Oral   SpO2: 98%   Weight: 108 lb 11.2 oz (49.3 kg)   Height: 5' 5\" (1.651 m)        Physical Exam:    Physical Exam  Vitals and nursing note reviewed. Constitutional:       Appearance: Normal appearance. HENT:      Head: Normocephalic and atraumatic. Cardiovascular:      Rate and Rhythm: Normal rate and regular rhythm. Pulmonary:      Effort: Pulmonary effort is normal.      Breath sounds: Normal breath sounds. Neurological:      Mental Status: She is alert. Labs and Imaging:    Last 3 Recorded Weights in this Encounter    03/04/22 1415   Weight: 108 lb 11.2 oz (49.3 kg)        No results found for: HBA1C, SHM4KPGV, EWE5SXGN, RCK8TFVQ     Assessment:     Patient Active Problem List   Diagnosis Code    Shortness of breath R06.02    Elevated troponin R77.8    COPD (chronic obstructive pulmonary disease) ([facility]) J44.9           Plan:     Hypothyroidism:  Reviewed records from the previous endocrinologist office. 1-:  TSH normal at 0.87  Currently on namebrand Synthroid 88 mcg daily. Plan:  Continue Synthroid 88 mcg daily. I will check TSH in 6 months. Osteoporosis:  Apparently diagnosed in ?  2020  Baseline bone density scan was done at [facility]. I do not have this for my review. Received Reclast infusion in January 2021 and January 2022  She has not had bone density scan since then. Plan:  Repeat bone density scan at [facility] to see if bone density has improved. Advised patient to consume 1-2 servings of dairy every day in addition to 2 calcium tablets and vitamin D 2000 units every day. Check CMP and vitamin D level today. If everything is looking okay I will see her in 6 months. Weightbearing exercises as tolerated. Avoid falls. Lung cancer:  S/p chemotherapy radiation and immunotherapy. In remission. Regularly following with hematologist oncologist Dr. Doug Mcknight.     Orders Placed This Encounter    DEXA BONE DENSITY STUDY AXIAL     Standing Status:   Future     Standing Expiration Date:   4/4/2023     Scheduling Instructions:      [facility]     Order Specific Question:   Reason for Exam     Answer:   osteoporosis on treatment follow up    METABOLIC PANEL, COMPREHENSIVE    PTH INTACT    VITAMIN D, 25 HYDROXY    Synthroid 88 mcg tablet     Sig: Take 1 Tablet by mouth Daily (before breakfast) for 90 days.      Dispense:  90 Tablet     Refill:  2     Brand synthroid        Signed By: Mariajose Shabazz MD     March 4, 2022      Return to clinic 6 months

## 2022-03-04 NOTE — LETTER
3/4/2022    Patient: Christine Bernal   YOB: 1940   Date of Visit: 3/4/2022     Riddhi Che Higgins MD  [address] [place] [address]  Via Fax: [phone]    Dear Celeste Ann MD,      Thank you for referring Ms. Farzana Arias to [address] [place] [address] for evaluation. My notes for this consultation are attached. If you have questions, please do not hesitate to call me. I look forward to following your patient along with you.       Sincerely,    Unique Young MD

## 2022-03-04 NOTE — PROGRESS NOTES
1. Have you been to the ER, urgent care clinic since your last visit? Hospitalized since your last visit? Yes When: JANUARY 2022 Where: [facility] Reason for visit: cough    2. Have you seen or consulted any other health care providers outside of the [address] [place] [address] since your last visit? Include any pap smears or colon screening.  No     Chief Complaint   Patient presents with    Thyroid Problem       Visit Vitals  /65 (BP 1 Location: Left upper arm, BP Patient Position: Sitting, BP Cuff Size: Adult)   Pulse 97   Temp 98 °F (36.7 °C) (Oral)   Resp 19   Ht 5' 5\" (1.651 m)   Wt 108 lb 11.2 oz (49.3 kg)   SpO2 98%   BMI 18.09 kg/m²

## 2022-03-19 PROBLEM — R77.8 ELEVATED TROPONIN: Status: ACTIVE | Noted: 2022-01-10

## 2022-03-19 PROBLEM — R79.89 ELEVATED TROPONIN: Status: ACTIVE | Noted: 2022-01-10

## 2022-03-19 PROBLEM — J44.9 COPD (CHRONIC OBSTRUCTIVE PULMONARY DISEASE) (HCC): Status: ACTIVE | Noted: 2022-01-11

## 2022-03-19 PROBLEM — R06.02 SHORTNESS OF BREATH: Status: ACTIVE | Noted: 2022-01-10

## 2022-05-10 DIAGNOSIS — M81.0 AGE RELATED OSTEOPOROSIS, UNSPECIFIED PATHOLOGICAL FRACTURE PRESENCE: ICD-10-CM

## 2022-08-05 ENCOUNTER — HOSPITAL ENCOUNTER (OUTPATIENT)
Dept: RADIATION THERAPY | Age: 82
Discharge: HOME | End: 2022-08-05

## 2023-03-11 ENCOUNTER — APPOINTMENT (OUTPATIENT)
Dept: CT IMAGING | Age: 83
DRG: 177 | End: 2023-03-11
Attending: EMERGENCY MEDICINE
Payer: MEDICARE

## 2023-03-11 ENCOUNTER — HOSPITAL ENCOUNTER (INPATIENT)
Age: 83
LOS: 5 days | Discharge: HOME | DRG: 177 | End: 2023-03-16
Attending: EMERGENCY MEDICINE | Admitting: HOSPITALIST
Payer: MEDICARE

## 2023-03-11 ENCOUNTER — APPOINTMENT (OUTPATIENT)
Dept: GENERAL RADIOLOGY | Age: 83
DRG: 177 | End: 2023-03-11
Attending: EMERGENCY MEDICINE
Payer: MEDICARE

## 2023-03-11 DIAGNOSIS — E87.1 HYPONATREMIA: Primary | ICD-10-CM

## 2023-03-11 DIAGNOSIS — U07.1 COVID-19: ICD-10-CM

## 2023-03-11 DIAGNOSIS — Z85.118 HISTORY OF LUNG CANCER: ICD-10-CM

## 2023-03-11 PROBLEM — C34.90 LUNG CANCER (HCC): Status: ACTIVE | Noted: 2023-03-11

## 2023-03-11 PROBLEM — J18.9 PNA (PNEUMONIA): Status: ACTIVE | Noted: 2023-03-11

## 2023-03-11 LAB
ALBUMIN SERPL-MCNC: 3.1 G/DL (ref 3.5–5)
ALBUMIN/GLOB SERPL: 0.9 (ref 1.1–2.2)
ALP SERPL-CCNC: 59 U/L (ref 45–117)
ALT SERPL-CCNC: 29 U/L (ref 12–78)
ANION GAP SERPL CALC-SCNC: 6 MMOL/L (ref 5–15)
AST SERPL W P-5'-P-CCNC: 22 U/L (ref 15–37)
ATRIAL RATE: 91 BPM
BASOPHILS # BLD: 0 K/UL (ref 0–0.1)
BASOPHILS NFR BLD: 0 % (ref 0–1)
BILIRUB SERPL-MCNC: 0.5 MG/DL (ref 0.2–1)
BNP SERPL-MCNC: 1205 PG/ML
BUN SERPL-MCNC: 16 MG/DL (ref 6–20)
BUN/CREAT SERPL: 20 (ref 12–20)
CA-I BLD-MCNC: 9 MG/DL (ref 8.5–10.1)
CALCULATED P AXIS, ECG09: 58 DEGREES
CALCULATED R AXIS, ECG10: 15 DEGREES
CALCULATED T AXIS, ECG11: 68 DEGREES
CHLORIDE SERPL-SCNC: 96 MMOL/L (ref 97–108)
CO2 SERPL-SCNC: 24 MMOL/L (ref 21–32)
CREAT SERPL-MCNC: 0.81 MG/DL (ref 0.55–1.02)
DIAGNOSIS, 93000: NORMAL
DIFFERENTIAL METHOD BLD: ABNORMAL
EOSINOPHIL # BLD: 0.1 K/UL (ref 0–0.4)
EOSINOPHIL NFR BLD: 1 % (ref 0–7)
ERYTHROCYTE [DISTWIDTH] IN BLOOD BY AUTOMATED COUNT: 14.2 % (ref 11.5–14.5)
GLOBULIN SER CALC-MCNC: 3.5 G/DL (ref 2–4)
GLUCOSE SERPL-MCNC: 94 MG/DL (ref 65–100)
HCT VFR BLD AUTO: 32.8 % (ref 35–47)
HGB BLD-MCNC: 11.1 G/DL (ref 11.5–16)
IMM GRANULOCYTES # BLD AUTO: 0.1 K/UL (ref 0–0.04)
IMM GRANULOCYTES NFR BLD AUTO: 1 % (ref 0–0.5)
LACTATE SERPL-SCNC: 1 MMOL/L (ref 0.4–2)
LYMPHOCYTES # BLD: 1.1 K/UL (ref 0.8–3.5)
LYMPHOCYTES NFR BLD: 9 % (ref 12–49)
MCH RBC QN AUTO: 29.5 PG (ref 26–34)
MCHC RBC AUTO-ENTMCNC: 33.8 G/DL (ref 30–36.5)
MCV RBC AUTO: 87.2 FL (ref 80–99)
MONOCYTES # BLD: 0.9 K/UL (ref 0–1)
MONOCYTES NFR BLD: 7 % (ref 5–13)
NEUTS SEG # BLD: 10.1 K/UL (ref 1.8–8)
NEUTS SEG NFR BLD: 82 % (ref 32–75)
NRBC # BLD: 0 K/UL (ref 0–0.01)
NRBC BLD-RTO: 0 PER 100 WBC
P-R INTERVAL, ECG05: 150 MS
PLATELET # BLD AUTO: 232 K/UL (ref 150–400)
PMV BLD AUTO: 9.1 FL (ref 8.9–12.9)
POTASSIUM SERPL-SCNC: 4.3 MMOL/L (ref 3.5–5.1)
PROT SERPL-MCNC: 6.6 G/DL (ref 6.4–8.2)
Q-T INTERVAL, ECG07: 398 MS
QRS DURATION, ECG06: 118 MS
QTC CALCULATION (BEZET), ECG08: 489 MS
RBC # BLD AUTO: 3.76 M/UL (ref 3.8–5.2)
SARS-COV-2 RDRP RESP QL NAA+PROBE: DETECTED
SODIUM SERPL-SCNC: 126 MMOL/L (ref 136–145)
TROPONIN I SERPL HS-MCNC: 12 NG/L (ref 0–51)
VENTRICULAR RATE, ECG03: 91 BPM
WBC # BLD AUTO: 12.2 K/UL (ref 3.6–11)

## 2023-03-11 PROCEDURE — 96374 THER/PROPH/DIAG INJ IV PUSH: CPT

## 2023-03-11 PROCEDURE — 84484 ASSAY OF TROPONIN QUANT: CPT

## 2023-03-11 PROCEDURE — 80053 COMPREHEN METABOLIC PANEL: CPT

## 2023-03-11 PROCEDURE — 65270000029 HC RM PRIVATE

## 2023-03-11 PROCEDURE — 96361 HYDRATE IV INFUSION ADD-ON: CPT

## 2023-03-11 PROCEDURE — 74011250636 HC RX REV CODE- 250/636: Performed by: PHYSICIAN ASSISTANT

## 2023-03-11 PROCEDURE — 74011000250 HC RX REV CODE- 250: Performed by: PHYSICIAN ASSISTANT

## 2023-03-11 PROCEDURE — 74011000636 HC RX REV CODE- 636: Performed by: EMERGENCY MEDICINE

## 2023-03-11 PROCEDURE — 93005 ELECTROCARDIOGRAM TRACING: CPT

## 2023-03-11 PROCEDURE — 71045 X-RAY EXAM CHEST 1 VIEW: CPT

## 2023-03-11 PROCEDURE — 74011250637 HC RX REV CODE- 250/637: Performed by: INTERNAL MEDICINE

## 2023-03-11 PROCEDURE — 74011250637 HC RX REV CODE- 250/637: Performed by: PHYSICIAN ASSISTANT

## 2023-03-11 PROCEDURE — 36415 COLL VENOUS BLD VENIPUNCTURE: CPT

## 2023-03-11 PROCEDURE — 99285 EMERGENCY DEPT VISIT HI MDM: CPT

## 2023-03-11 PROCEDURE — 87040 BLOOD CULTURE FOR BACTERIA: CPT

## 2023-03-11 PROCEDURE — 83880 ASSAY OF NATRIURETIC PEPTIDE: CPT

## 2023-03-11 PROCEDURE — 83605 ASSAY OF LACTIC ACID: CPT

## 2023-03-11 PROCEDURE — 85025 COMPLETE CBC W/AUTO DIFF WBC: CPT

## 2023-03-11 PROCEDURE — 87635 SARS-COV-2 COVID-19 AMP PRB: CPT

## 2023-03-11 PROCEDURE — 74011250636 HC RX REV CODE- 250/636: Performed by: EMERGENCY MEDICINE

## 2023-03-11 PROCEDURE — 71275 CT ANGIOGRAPHY CHEST: CPT

## 2023-03-11 RX ORDER — SODIUM CHLORIDE 0.9 % (FLUSH) 0.9 %
5-40 SYRINGE (ML) INJECTION AS NEEDED
Status: DISCONTINUED | OUTPATIENT
Start: 2023-03-11 | End: 2023-03-16 | Stop reason: HOSPADM

## 2023-03-11 RX ORDER — ASCORBIC ACID 500 MG
500 TABLET ORAL 2 TIMES DAILY
Status: DISCONTINUED | OUTPATIENT
Start: 2023-03-11 | End: 2023-03-16 | Stop reason: HOSPADM

## 2023-03-11 RX ORDER — LISINOPRIL 5 MG/1
5 TABLET ORAL DAILY
Status: DISCONTINUED | OUTPATIENT
Start: 2023-03-12 | End: 2023-03-16 | Stop reason: HOSPADM

## 2023-03-11 RX ORDER — ZINC SULFATE 50(220)MG
1 CAPSULE ORAL DAILY
Status: DISCONTINUED | OUTPATIENT
Start: 2023-03-12 | End: 2023-03-16 | Stop reason: HOSPADM

## 2023-03-11 RX ORDER — ACETAMINOPHEN 650 MG/1
650 SUPPOSITORY RECTAL
Status: DISCONTINUED | OUTPATIENT
Start: 2023-03-11 | End: 2023-03-16 | Stop reason: HOSPADM

## 2023-03-11 RX ORDER — METOPROLOL TARTRATE 25 MG/1
25 TABLET, FILM COATED ORAL 2 TIMES DAILY
COMMUNITY

## 2023-03-11 RX ORDER — DEXAMETHASONE SODIUM PHOSPHATE 10 MG/ML
10 INJECTION INTRAMUSCULAR; INTRAVENOUS ONCE
Status: COMPLETED | OUTPATIENT
Start: 2023-03-11 | End: 2023-03-11

## 2023-03-11 RX ORDER — ONDANSETRON 4 MG/1
4 TABLET, ORALLY DISINTEGRATING ORAL
Status: DISCONTINUED | OUTPATIENT
Start: 2023-03-11 | End: 2023-03-16 | Stop reason: HOSPADM

## 2023-03-11 RX ORDER — SODIUM CHLORIDE 0.9 % (FLUSH) 0.9 %
5-40 SYRINGE (ML) INJECTION EVERY 8 HOURS
Status: DISCONTINUED | OUTPATIENT
Start: 2023-03-11 | End: 2023-03-16 | Stop reason: HOSPADM

## 2023-03-11 RX ORDER — GUAIFENESIN 600 MG/1
600 TABLET, EXTENDED RELEASE ORAL EVERY 12 HOURS
Status: DISCONTINUED | OUTPATIENT
Start: 2023-03-11 | End: 2023-03-16 | Stop reason: HOSPADM

## 2023-03-11 RX ORDER — BENZONATATE 100 MG/1
100 CAPSULE ORAL
Status: DISCONTINUED | OUTPATIENT
Start: 2023-03-11 | End: 2023-03-16 | Stop reason: HOSPADM

## 2023-03-11 RX ORDER — ONDANSETRON 2 MG/ML
4 INJECTION INTRAMUSCULAR; INTRAVENOUS
Status: DISCONTINUED | OUTPATIENT
Start: 2023-03-11 | End: 2023-03-16 | Stop reason: HOSPADM

## 2023-03-11 RX ORDER — ALBUTEROL SULFATE 90 UG/1
2 AEROSOL, METERED RESPIRATORY (INHALATION)
Status: DISCONTINUED | OUTPATIENT
Start: 2023-03-11 | End: 2023-03-16 | Stop reason: HOSPADM

## 2023-03-11 RX ORDER — ALBUTEROL SULFATE 90 UG/1
2 AEROSOL, METERED RESPIRATORY (INHALATION)
Status: DISCONTINUED | OUTPATIENT
Start: 2023-03-11 | End: 2023-03-11

## 2023-03-11 RX ORDER — METOPROLOL TARTRATE 25 MG/1
25 TABLET, FILM COATED ORAL 2 TIMES DAILY
Status: DISCONTINUED | OUTPATIENT
Start: 2023-03-11 | End: 2023-03-16

## 2023-03-11 RX ORDER — POLYETHYLENE GLYCOL 3350 17 G/17G
17 POWDER, FOR SOLUTION ORAL DAILY PRN
Status: DISCONTINUED | OUTPATIENT
Start: 2023-03-11 | End: 2023-03-16 | Stop reason: HOSPADM

## 2023-03-11 RX ORDER — ACETAMINOPHEN 325 MG/1
650 TABLET ORAL
Status: DISCONTINUED | OUTPATIENT
Start: 2023-03-11 | End: 2023-03-16 | Stop reason: HOSPADM

## 2023-03-11 RX ORDER — FUROSEMIDE 10 MG/ML
40 INJECTION INTRAMUSCULAR; INTRAVENOUS ONCE
Status: COMPLETED | OUTPATIENT
Start: 2023-03-11 | End: 2023-03-11

## 2023-03-11 RX ADMIN — METOPROLOL TARTRATE 25 MG: 25 TABLET, FILM COATED ORAL at 22:15

## 2023-03-11 RX ADMIN — GUAIFENESIN 600 MG: 600 TABLET, EXTENDED RELEASE ORAL at 22:14

## 2023-03-11 RX ADMIN — DEXAMETHASONE SODIUM PHOSPHATE 10 MG: 10 INJECTION INTRAMUSCULAR; INTRAVENOUS at 11:07

## 2023-03-11 RX ADMIN — SODIUM CHLORIDE, PRESERVATIVE FREE 10 ML: 5 INJECTION INTRAVENOUS at 22:17

## 2023-03-11 RX ADMIN — ALBUTEROL SULFATE 2 PUFF: 108 AEROSOL, METERED RESPIRATORY (INHALATION) at 17:30

## 2023-03-11 RX ADMIN — OXYCODONE HYDROCHLORIDE AND ACETAMINOPHEN 500 MG: 500 TABLET ORAL at 22:15

## 2023-03-11 RX ADMIN — APIXABAN 2.5 MG: 2.5 TABLET, FILM COATED ORAL at 22:15

## 2023-03-11 RX ADMIN — IOPAMIDOL 100 ML: 755 INJECTION, SOLUTION INTRAVENOUS at 11:54

## 2023-03-11 RX ADMIN — FUROSEMIDE 40 MG: 10 INJECTION, SOLUTION INTRAMUSCULAR; INTRAVENOUS at 17:30

## 2023-03-11 RX ADMIN — SODIUM CHLORIDE, PRESERVATIVE FREE 10 ML: 5 INJECTION INTRAVENOUS at 17:30

## 2023-03-11 RX ADMIN — SODIUM CHLORIDE 250 ML: 9 INJECTION, SOLUTION INTRAVENOUS at 11:08

## 2023-03-11 RX ADMIN — METHYLPREDNISOLONE SODIUM SUCCINATE 40 MG: 40 INJECTION, POWDER, FOR SOLUTION INTRAMUSCULAR; INTRAVENOUS at 17:30

## 2023-03-11 NOTE — H&P
History and Physical    Patient: Frank Ramirez MRN: [id number]  SSN: [id number]    YOB: 1940  Age: 80 y.o. Sex: female      Subjective:      Frank Ramirez is a 80 y.o. female with a history of COPD, lung cancer, hypothyroidism that presented to the emergency room on 3/11/2023 for 1 week history of worsening shortness of breath. Patient's pulmonologist is Dr. Gume Tripp. Oncologist is Dr. Belen Haynes. Patient went for a PET scan on February 28, 2023. Patient daughter says that she tested positive that week for COVID. She tested the patient on 3/4/2023 and she was also positive. She called her primary care and was given Augmentin p.o. however per daughter at bedside says that over the course the last week she has had worsening shortness of breath, wheezing, nonproductive cough. Patient herself says that she feels okay and does admit to a cough. She says that her appetite is fine. She says that her daughter thinks that she does not drink enough fluids. There was no alleviating or aggravating factors. As they were concerned that she was not improving they brought her to the ED. In the ED patient was found to be acutely hypoxic with an oxygen saturation of 88. She was placed on oxygen nasal cannula. Chest x-ray showed interval increase in size of small left pleural effusion and left basilar consolidation. CTA of the chest showed small left pleural effusion, debris or carcinomatosis in the left pleural space, trace left lower lobe collapse, narrowing of the left pulmonary arteries. Laboratory data was significant for a BNP of 1205. Troponin x1 negative. Sodium 126. WBC 12.2. Rapid COVID is pending. As requested admission for hyponatremia, COVID-positive, increasing shortness of breath. Past Medical History:   Diagnosis Date    Chronic obstructive pulmonary disease ([facility] [address]     Hypertension     Lung cancer ([facility] [address]      History reviewed. No pertinent surgical history.  - port  No family history on file. -Hypertension  Social History     Tobacco Use    Smoking status: Never    Smokeless tobacco: Never   Substance Use Topics    Alcohol use: Not Currently      Prior to Admission medications    Medication Sig Start Date End Date Taking? Authorizing Provider   metoprolol tartrate (LOPRESSOR) 25 mg tablet Take 25 Tablets by mouth two (2) times a day. Provider, Historical   apixaban (ELIQUIS) 2.5 mg tablet Take 2.5 mg by mouth two (2) times a day. Provider, Historical   albuterol-ipratropium (DUO-NEB) 2.5 mg-0.5 mg/3 ml nebu 3 mL by Nebulization route every four (4) hours as needed for Wheezing. 1/12/22   Nilam Fleming PA-C   Nebulizer & Compressor machine 1 Each by Does Not Apply route four (4) times daily as needed for Wheezing or Shortness of Breath. 1/12/22   Nilam Fleming PA-C   lisinopriL (PRINIVIL, ZESTRIL) 5 mg tablet Take 5 mg by mouth daily.  Indications: high blood pressure    Provider, Historical        No Known Allergies    Review of Systems:  Constitutional: No fevers, No chills, No fatigue, + weakness  Eyes: No visual disturbance  Ears, Nose, Mouth, Throat, and Face: No nasal congestion, No sore throat  Respiratory: +cough, No sputum, +wheezing, + SOB  Cardiovascular: No chest pain, No lower extremity edema, No Palpitations   Gastrointestinal: No nausea, No vomiting, No diarrhea, No constipation, No abdominal pain  Genitourinary: No frequency, No dysuria, No hematuria  Integument/Breast: No rash, No skin lesion(s), No dryness  Musculoskeletal: No arthralgias, No neck pain, No back pain  Neurological: No headaches, No dizziness, No confusion,  No seizures  Behavioral/Psychiatric: No anxiety, No depression      Objective:     Vitals:    03/11/23 1202 03/11/23 1237 03/11/23 1242 03/11/23 1301   BP: (!) 155/98 (!) 162/89  (!) 147/79   Pulse: 95  93 88   Resp: 25  27 24   Temp:       SpO2: 95% (!) 89% 95% 95%   Weight:       Height:            Physical Exam:  General: alert, cooperative, no distress  Eye: conjunctivae/corneas clear. PERRL, EOM's intact. Throat and Neck: normal and no erythema or exudates noted. No mass   Lung: diminishied, nonlabored  Heart: regular rate and rhythm,   Abdomen: soft, non-tender. Bowel sounds normal. No masses,  Extremities:  able to move all extremities normal, atraumatic  Skin: Normal.  Neurologic: AOx3. Cranial nerves 2-12 and sensation grossly intact. Psychiatric: non focal    Recent Results (from the past 24 hour(s))   CBC WITH AUTOMATED DIFF    Collection Time: 03/11/23 10:24 AM   Result Value Ref Range    WBC 12.2 (H) 3.6 - 11.0 K/uL    RBC 3.76 (L) 3.80 - 5.20 M/uL    HGB 11.1 (L) 11.5 - 16.0 g/dL    HCT 32.8 (L) 35.0 - 47.0 %    MCV 87.2 80.0 - 99.0 FL    MCH 29.5 26.0 - 34.0 PG    MCHC 33.8 30.0 - 36.5 g/dL    RDW 14.2 11.5 - 14.5 %    PLATELET 319 777 - 431 K/uL    MPV 9.1 8.9 - 12.9 FL    NRBC 0.0 0.0  WBC    ABSOLUTE NRBC 0.00 0.00 - 0.01 K/uL    NEUTROPHILS 82 (H) 32 - 75 %    LYMPHOCYTES 9 (L) 12 - 49 %    MONOCYTES 7 5 - 13 %    EOSINOPHILS 1 0 - 7 %    BASOPHILS 0 0 - 1 %    IMMATURE GRANULOCYTES 1 (H) 0 - 0.5 %    ABS. NEUTROPHILS 10.1 (H) 1.8 - 8.0 K/UL    ABS. LYMPHOCYTES 1.1 0.8 - 3.5 K/UL    ABS. MONOCYTES 0.9 0.0 - 1.0 K/UL    ABS. EOSINOPHILS 0.1 0.0 - 0.4 K/UL    ABS. BASOPHILS 0.0 0.0 - 0.1 K/UL    ABS. IMM. GRANS. 0.1 (H) 0.00 - 0.04 K/UL    DF AUTOMATED     METABOLIC PANEL, COMPREHENSIVE    Collection Time: 03/11/23 10:24 AM   Result Value Ref Range    Sodium 126 (L) 136 - 145 mmol/L    Potassium 4.3 3.5 - 5.1 mmol/L    Chloride 96 (L) 97 - 108 mmol/L    CO2 24 21 - 32 mmol/L    Anion gap 6 5 - 15 mmol/L    Glucose 94 65 - 100 mg/dL    BUN 16 6 - 20 mg/dL    Creatinine 0.81 0.55 - 1.02 mg/dL    BUN/Creatinine ratio 20 12 - 20      eGFR >60 >60 ml/min/1.73m2    Calcium 9.0 8.5 - 10.1 mg/dL    Bilirubin, total 0.5 0.2 - 1.0 mg/dL    AST (SGOT) 22 15 - 37 U/L    ALT (SGPT) 29 12 - 78 U/L    Alk.  phosphatase 59 45 - 117 U/L    Protein, total 6.6 6.4 - 8.2 g/dL    Albumin 3.1 (L) 3.5 - 5.0 g/dL    Globulin 3.5 2.0 - 4.0 g/dL    A-G Ratio 0.9 (L) 1.1 - 2.2     TROPONIN-HIGH SENSITIVITY    Collection Time: 03/11/23 10:24 AM   Result Value Ref Range    Troponin-High Sensitivity 12 0 - 51 ng/L   CULTURE, BLOOD, PAIRED    Collection Time: 03/11/23 10:24 AM    Specimen: Blood   Result Value Ref Range    Special Requests: No Special Requests      Culture result: No growth after 2 hours     LACTIC ACID    Collection Time: 03/11/23 10:24 AM   Result Value Ref Range    Lactic acid 1.0 0.4 - 2.0 mmol/L   NT-PRO BNP    Collection Time: 03/11/23 10:45 AM   Result Value Ref Range    NT pro-BNP 1,205 (H) <450 pg/mL       XR Results (maximum last 3): Results from [place] encounter on 03/11/23    XR CHEST PORT    Narrative  INDICATION: Shortness of breath. Portable AP view of the chest.    Direct comparison made to prior chest x-ray dated January 2022. Cardiomediastinal silhouette is stable. Central venous port catheter extends to  the distal SVC. There is left lung volume loss. There is a small left pleural  effusion, increased in size. There is left basilar consolidation. Right lung is  grossly clear. No right pleural fluid is visualized. There is no pneumothorax. Impression  Interval increase in size of small left pleural effusion and left  basilar consolidation. Results from Hospital Encounter encounter on 01/10/22    XR CHEST PORT    Narrative  HISTORY:  Chest Pain    TECHNIQUE:  XR CHEST PORT    COMPARISON: CT scan performed 5/24/2019  LIMITATIONS: None    TUBES/LINES: Right chest wall Port-A-Cath with tip to the distal SVC    LUNG PARENCHYMA: Overall hyperexpanded appearance of the lungs. Obscuration of  left hemidiaphragm.     TRACHEA/BRONCHI: Normal  PULMONARY VESSELS: Normal  PLEURA: Blunting of left costophrenic angle  HEART: Normal  AORTIC SHADOW:Normal.  MEDIASTINUM: Normal  BONE/SOFT TISSUES: No acute abnormality. OTHER: None    Impression  Left lung base atelectasis/airspace disease and/or effusion. These  may be chronic findings as a similar abnormality was present on the patient's  prior chest CT. [facility] Results (maximum last 3): Results from Hospital Encounter encounter on 03/11/23    CTA CHEST W OR W WO CONT    Narrative  CT ANGIOGRAPHY CHEST. 3/11/2023 11:55 AM    INDICATION: Dyspnea, COVID-19 positive, lung carcinoma, history of DVT. Pulmonary embolism. COMPARISON: None. TECHNIQUE: CT angiography of the chest was performed after the administration of  100 cc IV contrast (Isovue 370). Coronal and sagittal, and coronal MIP  reconstructions were performed. CT dose reduction was achieved through use of a  standardized protocol tailored for this examination and automatic exposure  control for dose modulation. FINDINGS:  The left lower lobe is tightly collapsed. The left lower lobe pulmonary artery  is narrowed (601-52, 602-69) and there is more mild narrowing of the left upper  lobe pulmonary artery (602-60). There is a small left pleural effusion. Left  pleural nodularity in the left costophrenic angle (601-97, 603-94) may be debris  or pleural carcinomatosis. No superimposed airspace consolidation. Mosaic attenuation in the right lung may  represent small airways or small vessel disease. The central airways are patent. There is subsegmental endobronchial mucous plugging in the lingula. The heart size is normal. Three-vessel coronary calcifications are extensive. Bovine arch is a normal variant. No thoracic lymphadenopathy. The thyroid is  atrophic. There are multiple subacute or chronic fractures of the lower left ribs at the  costovertebral junctions. There is a lateral 11th rib fracture as well. Impression  1. Small left pleural effusion. Debris or carcinomatosis in the left pleural  space. 2. Tight left lower lobe collapse. Narrowing of the left pulmonary arteries.       MRI Results (maximum last 3): No results found for this or any previous visit. Nuclear Medicine Results (maximum last 3): No results found for this or any previous visit. US Results (maximum last 3): No results found for this or any previous visit. Assessment:   1. Acute hypoxic respiratory failure secondary to COVID  2. Left pleural effusions  3. History of lung cancer  4. Hypothyroidism  5. Hyponatremia  6. Hypertension    Plan:   1. Patient found acutely hypoxic in the emergency room. Successfully weaned off of oxygen in the ER. She does not use oxygen at home. Chest x-ray showed signs concerning for worsening pleural effusions. CTA of the chest shows no consolidation however patient is at increased risk due to her COVID and lung cancer. We will start IV azithromycin along with IV steroids. Albuterol 2 puffs every 4 hours. Consult pulmonology  2. BNP is elevated. Troponin x1 negative. Pleural effusions are present. We will give a one-time dose of IV Lasix 40 mg.  Repeat BMP in the a.m. 3.  Consult patient's oncologist  4. On Synthroid  5. Sodium levels low at 126. We will do fluid restriction and give a dose of IV Lasix 40 mg.  Repeat in the a.m.  6.  Blood pressure is stable. Continue to monitor per unit protocol. Cardiac telemetry. On lisinopril 5 mg daily along with metoprolol 25 mg twice daily. Monitor for signs of hypotension  7. CBC BMP in a.m.     Full Code    DVT prophylaxis Eliquis  GI prophylaxis not indicated    Total time: 61 min     Signed By: Robyn Dennis PA-C     March 11, 2023

## 2023-03-11 NOTE — CONSULTS
Consult Date: 3/11/2023    IP CONSULT TO PULMONOLOGY  Consult performed by: Zeke Burkitt, MD  Consult ordered by: Emely Coleman PA-C    History of presenting illness:  Patient is a pleasant 24-year-old  female. Information obtained from current medical records and also from the daughter at the bedside. The patient has been a lifelong non-smoker. She sees Dr. Ramírez Porter in our practice. She has a history of lung cancer status post radiation therapy, chemo and immunotherapy. It appears that her cancer is recurrent and has been started back on some therapy again. The daughter is not sure about the details. She uses a backup inhaler as needed and also has nebulized albuterol. Does not have oxygen. She had a PET scan done at [facility] on 2/28/2023. Her oncologist, Dr. Anshu Jessica called the daughter. There was concern about right lung infection. Also left pleural effusion. Exactly 1 week ago the patient tested positive for COVID-19. She was treated with Augmentin by her oncologist.  However her symptoms progressed. She describes weakness, fatigue and cough which is mainly nonproductive. She presented to the hospital today. She tested positive for COVID-19 again. Currently she is on room air. Intermittently she becomes tachycardiac. No distress. I was asked for further evaluation. The daughter tells me that she has a history of congestive heart failure, EF is 25% and DVT for which she is on Eliquis. She had a CT of the chest done which will be discussed below. Subjective     Past Medical History:   Diagnosis Date    Chronic obstructive pulmonary disease ([facility] [address]     Hypertension     Lung cancer ([facility] [address]       History reviewed. No pertinent surgical history. No family history on file.    Social History     Tobacco Use    Smoking status: Never    Smokeless tobacco: Never   Substance Use Topics    Alcohol use: Not Currently       Current Facility-Administered Medications   Medication Dose Route Frequency Provider Last Rate Last Admin    apixaban (ELIQUIS) tablet 2.5 mg  2.5 mg Oral BID Anamaria Otero PA-C        [START ON 3/12/2023] lisinopriL (PRINIVIL, ZESTRIL) tablet 5 mg  5 mg Oral DAILY Anamaria Otero PA-C        metoprolol tartrate (LOPRESSOR) tablet 25 mg  25 mg Oral BID Anamaria Otero PA-C        benzonatate (TESSALON) capsule 100 mg  100 mg Oral TID PRN Anamaria Otero PA-C        albuterol (PROVENTIL HFA, VENTOLIN HFA, PROAIR HFA) inhaler 2 Puff  2 Puff Inhalation Q4H RT Anamaria Otero PA-C   2 Puff at 03/11/23 1730    methylPREDNISolone (PF) (SOLU-MEDROL) injection 40 mg  40 mg IntraVENous Q6H Anamaria Otero PA-C   40 mg at 03/11/23 1730    ascorbic acid (vitamin C) (VITAMIN C) tablet 500 mg  500 mg Oral BID Anamaria Otero PA-C        sodium chloride (NS) flush 5-40 mL  5-40 mL IntraVENous Q8H Anamaria Otero PA-C   10 mL at 03/11/23 1730    sodium chloride (NS) flush 5-40 mL  5-40 mL IntraVENous PRN Anamaria Otero PA-C        acetaminophen (TYLENOL) tablet 650 mg  650 mg Oral Q6H PRN Anamaria Otero PA-C        Or    acetaminophen (TYLENOL) suppository 650 mg  650 mg Rectal Q6H PRN Anamaria Otero PA-C        polyethylene glycol (MIRALAX) packet 17 g  17 g Oral DAILY PRN Anamaria Otero PA-C        ondansetron (ZOFRAN ODT) tablet 4 mg  4 mg Oral Q8H PRN Anamaria Otero PA-C        Or    ondansetron (ZOFRAN) injection 4 mg  4 mg IntraVENous Q6H PRN Anamaria Otero PA-C        [START ON 3/12/2023] azithromycin (ZITHROMAX) 500 mg in 0.9% sodium chloride 250 mL ([id number])  500 mg IntraVENous Q24H Gualberto Flores MD         Current Outpatient Medications   Medication Sig Dispense Refill    metoprolol tartrate (LOPRESSOR) 25 mg tablet Take 25 Tablets by mouth two (2) times a day. apixaban (ELIQUIS) 2.5 mg tablet Take 2.5 mg by mouth two (2) times a day.       albuterol-ipratropium (DUO-NEB) 2.5 mg-0.5 mg/3 ml nebu 3 mL by Nebulization route every four (4) hours as needed for Wheezing. 30 Nebule 0    Nebulizer & Compressor machine 1 Each by Does Not Apply route four (4) times daily as needed for Wheezing or Shortness of Breath. 1 Each 0    lisinopriL (PRINIVIL, ZESTRIL) 5 mg tablet Take 5 mg by mouth daily. Indications: high blood pressure          Review of Systems:  Complete review of system was undertaken. Pertinent findings are discussed above. All other systems were reviewed and are negative. Objective     Vital signs for last 24 hours:  Visit Vitals  /73   Pulse 91   Temp 98.1 °F (36.7 °C)   Resp 20   Ht 5' 3\" (1.6 m)   Wt 49 kg (108 lb)   SpO2 95%   BMI 19.13 kg/m²       Intake/Output this shift:  Current Shift: No intake/output data recorded. Last 3 Shifts: No intake/output data recorded.     Data Review:   Recent Results (from the past 24 hour(s))   EKG, 12 LEAD, INITIAL    Collection Time: 03/11/23 10:14 AM   Result Value Ref Range    Ventricular Rate 91 BPM    Atrial Rate 91 BPM    P-R Interval 150 ms    QRS Duration 118 ms    Q-T Interval 398 ms    QTC Calculation (Bezet) 489 ms    Calculated P Axis 58 degrees    Calculated R Axis 15 degrees    Calculated T Axis 68 degrees    Diagnosis       Normal sinus rhythm  Low voltage QRS  Cannot rule out Anterior infarct , age undetermined  Abnormal ECG  No previous ECGs available  Confirmed by Gold Soriano ([id number]) on 3/11/2023 5:54:32 PM     CBC WITH AUTOMATED DIFF    Collection Time: 03/11/23 10:24 AM   Result Value Ref Range    WBC 12.2 (H) 3.6 - 11.0 K/uL    RBC 3.76 (L) 3.80 - 5.20 M/uL    HGB 11.1 (L) 11.5 - 16.0 g/dL    HCT 32.8 (L) 35.0 - 47.0 %    MCV 87.2 80.0 - 99.0 FL    MCH 29.5 26.0 - 34.0 PG    MCHC 33.8 30.0 - 36.5 g/dL    RDW 14.2 11.5 - 14.5 %    PLATELET 252 217 - 562 K/uL    MPV 9.1 8.9 - 12.9 FL    NRBC 0.0 0.0  WBC    ABSOLUTE NRBC 0.00 0.00 - 0.01 K/uL    NEUTROPHILS 82 (H) 32 - 75 %    LYMPHOCYTES 9 (L) 12 - 49 %    MONOCYTES 7 5 - 13 %    EOSINOPHILS 1 0 - 7 %    BASOPHILS 0 0 - 1 % IMMATURE GRANULOCYTES 1 (H) 0 - 0.5 %    ABS. NEUTROPHILS 10.1 (H) 1.8 - 8.0 K/UL    ABS. LYMPHOCYTES 1.1 0.8 - 3.5 K/UL    ABS. MONOCYTES 0.9 0.0 - 1.0 K/UL    ABS. EOSINOPHILS 0.1 0.0 - 0.4 K/UL    ABS. BASOPHILS 0.0 0.0 - 0.1 K/UL    ABS. IMM. GRANS. 0.1 (H) 0.00 - 0.04 K/UL    DF AUTOMATED     METABOLIC PANEL, COMPREHENSIVE    Collection Time: 03/11/23 10:24 AM   Result Value Ref Range    Sodium 126 (L) 136 - 145 mmol/L    Potassium 4.3 3.5 - 5.1 mmol/L    Chloride 96 (L) 97 - 108 mmol/L    CO2 24 21 - 32 mmol/L    Anion gap 6 5 - 15 mmol/L    Glucose 94 65 - 100 mg/dL    BUN 16 6 - 20 mg/dL    Creatinine 0.81 0.55 - 1.02 mg/dL    BUN/Creatinine ratio 20 12 - 20      eGFR >60 >60 ml/min/1.73m2    Calcium 9.0 8.5 - 10.1 mg/dL    Bilirubin, total 0.5 0.2 - 1.0 mg/dL    AST (SGOT) 22 15 - 37 U/L    ALT (SGPT) 29 12 - 78 U/L    Alk. phosphatase 59 45 - 117 U/L    Protein, total 6.6 6.4 - 8.2 g/dL    Albumin 3.1 (L) 3.5 - 5.0 g/dL    Globulin 3.5 2.0 - 4.0 g/dL    A-G Ratio 0.9 (L) 1.1 - 2.2     TROPONIN-HIGH SENSITIVITY    Collection Time: 03/11/23 10:24 AM   Result Value Ref Range    Troponin-High Sensitivity 12 0 - 51 ng/L   CULTURE, BLOOD, PAIRED    Collection Time: 03/11/23 10:24 AM    Specimen: Blood   Result Value Ref Range    Special Requests: No Special Requests      Culture result: No growth after 6 hours     LACTIC ACID    Collection Time: 03/11/23 10:24 AM   Result Value Ref Range    Lactic acid 1.0 0.4 - 2.0 mmol/L   NT-PRO BNP    Collection Time: 03/11/23 10:45 AM   Result Value Ref Range    NT pro-BNP 1,205 (H) <450 pg/mL   COVID-19 RAPID TEST    Collection Time: 03/11/23  5:00 PM   Result Value Ref Range    COVID-19 rapid test DETECTED (A) Not Detected         Physical Exam:  Patient is a elderly white female who is on room air. Not in any distress. Vital signs as above. HEENT examination show pupils are equal and reactive to light. No significant pallor. No icterus. Nasal passages are patent. Oropharynx is without thrush. Neck is supple and trachea central.  No JVD or lymphadenopathy. She is not using accessory muscles of respiration. Chest is symmetrical with equal and fair air entry bilaterally. She is diminished breath sounds over the left lung base with some crackles. Occasional scattered wheezes. She begins to cough with deep inspiration. No chest wall tenderness. Rhythm is regular without any loud murmur or gallop. Sinus tachycardia intermittently is noted on the monitor. Abdomen is obese and benign. Bowel sounds audible. No masses or organomegaly. Extremities do not show any cyanosis or clubbing. No significant pitting edema. Pulses are palpable. Neurologic system examination is grossly intact. Skin is warm and dry. Laboratory data:  Portable chest x-ray done today shows slight increase in the left pleural effusion as compared to a prior study of January 2022. CTA of the chest was obtained today. No pulmonary embolism is seen, personally reviewed. However the report does not mention specifically the absence of pulmonary embolism. There is tight collapse of the left lower lobe described. According to the report the left lower lobe pulmonary artery is narrowed. There is a small left pleural effusion with possible debris in it. She has chronic left-sided rib fractures. There are no comparison. There is mild mosaic attenuation of the right lung. Electrolytes are within normal limits. BUN is 16 creatinine is 1.81. Blood glucose of 94. WBC count 12.2 with neutrophils of 82% hemoglobin is 11.1 and a platelet count of 179. Lactic acid 1.0 troponin 12. BNP is 1/2/2005. Rapid COVID test is positive. Assessment and plan:  1. The patient has COVID-19. CT of the chest does not show any focal pneumonia. There is some debris noted in the lingular bronchus.   There are changes of chronic left pleural effusion with left lower lobe atelectasis/collapse which is related to her history of lung cancer. She recently completed a course of Augmentin. Agree with starting her on azithromycin for atypical coverage and IV Solu-Medrol. I will add guaifenesin. Continue with albuterol inhaler on a as needed basis. She is started on ascorbic acid and I will add zinc.  She is currently on room air. She does not need any specific treatment for COVID-19. She has been fully vaccinated. 2.  History of lung cancer. Details are not very clear. Status posttreatment but it appears to be recurrent. She had a PET scan done on 2/28/2023 at [facility].  Result is not available at this time. 3.  History of DVT. She is on Eliquis. 4.  History of cardiomyopathy with a EF of 25% according to the daughter. Echocardiogram would be helpful. Thank you for allowing me to participate in the care of this patient. I will follow the patient closely with you. The patient was evaluated in the emergency room.

## 2023-03-11 NOTE — ED PROVIDER NOTES
[facility] EMERGENCY DEPT   EMERGENCY DEPARTMENT HISTORY AND PHYSICAL EXAM      Date: 3/11/2023  Patient Name: Alma Delia Dewitt      History of Presenting Illness     Chief Complaint   Patient presents with    Shortness of Breath    Positive For Covid-19       History Provided By: Patient    Means of Arrival: by private vehicle. Location/Duration/Severity/Modifying factors   Patient is a 71-year-old female with past medical history of lung cancer, DVT, COPD presenting to the emergency department today with a chief complaint of shortness of breath. Patient's daughter who is at the bedside provides most of the history although patient contributes as well. Patient had a PET scan done on 2/28 that showed possible pneumonia or COVID infection of the right lung, per the patient's daughter. We will start Augmentin at that time. Patient was tested for COVID last week due to patient's daughter being positive and that was positive. Patient is a intermittent cough and an episode of shortness of breath this morning. Patient states she feels relatively good this morning otherwise and symptoms seem to be improving. She has not had a fever throughout her illness. No chills or any chest pain. No vomiting diarrhea or any leg swelling. States she has been compliant with her Eliquis. Shortness of Breath    Positive For Covid-19    There are no other complaints, changes, or physical findings at this time.     PCP: Dory Vazquez MD    Current Facility-Administered Medications   Medication Dose Route Frequency Provider Last Rate Last Admin    apixaban (ELIQUIS) tablet 2.5 mg  2.5 mg Oral BID Anamaria Otero PA-C        [START ON 3/12/2023] lisinopriL (PRINIVIL, ZESTRIL) tablet 5 mg  5 mg Oral DAILY Anamaria Otero PA-C        metoprolol tartrate (LOPRESSOR) tablet 25 mg  25 mg Oral BID Anamaria Otero PA-C        benzonatate (TESSALON) capsule 100 mg  100 mg Oral TID PRN Rosalba Fan PA-C        albuterol (PROVENTIL HFA, VENTOLIN HFA, PROAIR HFA) inhaler 2 Puff  2 Puff Inhalation Q4H RT Anamaria Otero PA-C        methylPREDNISolone (PF) (SOLU-MEDROL) injection 40 mg  40 mg IntraVENous Q6H Anamaria Otero PA-C        ascorbic acid (vitamin C) (VITAMIN C) tablet 500 mg  500 mg Oral BID Anamaria Otero PA-C        sodium chloride (NS) flush 5-40 mL  5-40 mL IntraVENous Q8H Anamaria Otero PA-C        sodium chloride (NS) flush 5-40 mL  5-40 mL IntraVENous PRN Anamaria Otero PA-C        acetaminophen (TYLENOL) tablet 650 mg  650 mg Oral Q6H PRN Anamaria Otero PA-C        Or    acetaminophen (TYLENOL) suppository 650 mg  650 mg Rectal Q6H PRN Anamaria Otero PA-C        polyethylene glycol (MIRALAX) packet 17 g  17 g Oral DAILY PRN Anamaria Otero PA-C        ondansetron (ZOFRAN ODT) tablet 4 mg  4 mg Oral Q8H PRN Anamaria Otero PA-C        Or    ondansetron (ZOFRAN) injection 4 mg  4 mg IntraVENous Q6H PRN Anamaria Otero PA-C        furosemide (LASIX) injection 40 mg  40 mg IntraVENous ONCE Anamaria Otero PA-C        [START ON 3/12/2023] azithromycin (ZITHROMAX) 500 mg in 0.9% sodium chloride 250 mL ([id number])  500 mg IntraVENous Q24H Gualberto Flores MD         Current Outpatient Medications   Medication Sig Dispense Refill    metoprolol tartrate (LOPRESSOR) 25 mg tablet Take 25 Tablets by mouth two (2) times a day. apixaban (ELIQUIS) 2.5 mg tablet Take 2.5 mg by mouth two (2) times a day. albuterol-ipratropium (DUO-NEB) 2.5 mg-0.5 mg/3 ml nebu 3 mL by Nebulization route every four (4) hours as needed for Wheezing. 30 Nebule 0    Nebulizer & Compressor machine 1 Each by Does Not Apply route four (4) times daily as needed for Wheezing or Shortness of Breath. 1 Each 0    lisinopriL (PRINIVIL, ZESTRIL) 5 mg tablet Take 5 mg by mouth daily.  Indications: high blood pressure         Past History     Past Medical History:  Past Medical History:   Diagnosis Date    Chronic obstructive pulmonary disease [facility])     Hypertension     Lung cancer [facility])        Past Surgical History:  History reviewed. No pertinent surgical history. Family History:  No family history on file.     Social History:  Social History     Tobacco Use    Smoking status: Never    Smokeless tobacco: Never   Substance Use Topics    Alcohol use: Not Currently    Drug use: Not Currently       Allergies:  No Known Allergies    Medications:  Current Facility-Administered Medications   Medication Dose Route Frequency Provider Last Rate Last Admin    apixaban (ELIQUIS) tablet 2.5 mg  2.5 mg Oral BID Paradise Koo PA-C        [START ON 3/12/2023] lisinopriL (PRINIVIL, ZESTRIL) tablet 5 mg  5 mg Oral DAILY Anamaria Otero PA-C        metoprolol tartrate (LOPRESSOR) tablet 25 mg  25 mg Oral BID Anamaria Otero PA-C        benzonatate (TESSALON) capsule 100 mg  100 mg Oral TID PRN Anamaria Otero PA-C        albuterol (PROVENTIL HFA, VENTOLIN HFA, PROAIR HFA) inhaler 2 Puff  2 Puff Inhalation Q4H RT Anamaria Otero PA-C        methylPREDNISolone (PF) (SOLU-MEDROL) injection 40 mg  40 mg IntraVENous Q6H Anamaria Otero PA-C        ascorbic acid (vitamin C) (VITAMIN C) tablet 500 mg  500 mg Oral BID Anamaria Otero PA-C        sodium chloride (NS) flush 5-40 mL  5-40 mL IntraVENous Q8H Anamaria Otero PA-C        sodium chloride (NS) flush 5-40 mL  5-40 mL IntraVENous PRN Anamaria Otero PA-C        acetaminophen (TYLENOL) tablet 650 mg  650 mg Oral Q6H PRN Anamaria Otero PA-C        Or    acetaminophen (TYLENOL) suppository 650 mg  650 mg Rectal Q6H PRN Anamaria Otero PA-C        polyethylene glycol (MIRALAX) packet 17 g  17 g Oral DAILY PRN Anamaria Otero PA-C        ondansetron (ZOFRAN ODT) tablet 4 mg  4 mg Oral Q8H PRN Anamaria Otero PA-C        Or    ondansetron (ZOFRAN) injection 4 mg  4 mg IntraVENous Q6H PRN Anamaria Otero PA-C        furosemide (LASIX) injection 40 mg  40 mg IntraVENous ONCE Flakita Otero PA-C Annette Holt ON 3/12/2023] azithromycin (ZITHROMAX) 500 mg in 0.9% sodium chloride 250 mL ([id number])  500 mg IntraVENous Q24H Gualberto Flores MD         Current Outpatient Medications   Medication Sig Dispense Refill    metoprolol tartrate (LOPRESSOR) 25 mg tablet Take 25 Tablets by mouth two (2) times a day. apixaban (ELIQUIS) 2.5 mg tablet Take 2.5 mg by mouth two (2) times a day. albuterol-ipratropium (DUO-NEB) 2.5 mg-0.5 mg/3 ml nebu 3 mL by Nebulization route every four (4) hours as needed for Wheezing. 30 Nebule 0    Nebulizer & Compressor machine 1 Each by Does Not Apply route four (4) times daily as needed for Wheezing or Shortness of Breath. 1 Each 0    lisinopriL (PRINIVIL, ZESTRIL) 5 mg tablet Take 5 mg by mouth daily. Indications: high blood pressure         Social Determinants of Health:  Social Determinants of Health     Tobacco Use: Low Risk     Smoking Tobacco Use: Never    Smokeless Tobacco Use: Never    Passive Exposure: Not on file   Alcohol Use: Not on file   Financial Resource Strain: Not on file   Food Insecurity: Not on file   Transportation Needs: Not on file   Physical Activity: Not on file   Stress: Not on file   Social Connections: Not on file   Intimate Partner Violence: Not on file   Depression: Not on file   Housing Stability: Not on file     Good access to primary and/or specialist care. Reports generally stable financial, home and living environment. Physical Exam     Physical Exam  Vitals and nursing note reviewed. Constitutional:       General: She is not in acute distress. Appearance: Normal appearance. She is not ill-appearing or toxic-appearing. Comments: Nontoxic, appears stated age   HENT:      Head: Normocephalic and atraumatic.       Right Ear: External ear normal.      Left Ear: External ear normal.      Nose: Nose normal.      Mouth/Throat:      Mouth: Mucous membranes are moist.   Eyes:      Conjunctiva/sclera: Conjunctivae normal.      Pupils: Pupils are equal, round, and reactive to light. Cardiovascular:      Rate and Rhythm: Normal rate and regular rhythm. Pulses: Normal pulses. Heart sounds: Normal heart sounds. No murmur heard. Pulmonary:      Effort: Pulmonary effort is normal. No tachypnea or bradypnea. Breath sounds: Examination of the right-lower field reveals rales. Examination of the left-lower field reveals rales. Rales present. No wheezing or rhonchi. Comments: Minimal crackles to both lower lobes posteriorly no overt respiratory distress, mild resting tachypnea  Abdominal:      General: Abdomen is flat. Palpations: Abdomen is soft. Tenderness: There is no abdominal tenderness. There is no guarding or rebound. Musculoskeletal:         General: No swelling or tenderness. Normal range of motion. Cervical back: Normal range of motion and neck supple. Right lower leg: No tenderness. No edema. Left lower leg: No tenderness. No edema. Skin:     General: Skin is warm and dry. Capillary Refill: Capillary refill takes less than 2 seconds. Findings: No rash. Neurological:      General: No focal deficit present. Mental Status: She is alert. Lab and Diagnostic Study Results     Labs -  Recent Results (from the past 24 hour(s))   CBC WITH AUTOMATED DIFF    Collection Time: 03/11/23 10:24 AM   Result Value Ref Range    WBC 12.2 (H) 3.6 - 11.0 K/uL    RBC 3.76 (L) 3.80 - 5.20 M/uL    HGB 11.1 (L) 11.5 - 16.0 g/dL    HCT 32.8 (L) 35.0 - 47.0 %    MCV 87.2 80.0 - 99.0 FL    MCH 29.5 26.0 - 34.0 PG    MCHC 33.8 30.0 - 36.5 g/dL    RDW 14.2 11.5 - 14.5 %    PLATELET 665 147 - 000 K/uL    MPV 9.1 8.9 - 12.9 FL    NRBC 0.0 0.0  WBC    ABSOLUTE NRBC 0.00 0.00 - 0.01 K/uL    NEUTROPHILS 82 (H) 32 - 75 %    LYMPHOCYTES 9 (L) 12 - 49 %    MONOCYTES 7 5 - 13 %    EOSINOPHILS 1 0 - 7 %    BASOPHILS 0 0 - 1 %    IMMATURE GRANULOCYTES 1 (H) 0 - 0.5 %    ABS. NEUTROPHILS 10.1 (H) 1.8 - 8.0 K/UL    ABS. LYMPHOCYTES 1.1 0.8 - 3.5 K/UL    ABS. MONOCYTES 0.9 0.0 - 1.0 K/UL    ABS. EOSINOPHILS 0.1 0.0 - 0.4 K/UL    ABS. BASOPHILS 0.0 0.0 - 0.1 K/UL    ABS. IMM. GRANS. 0.1 (H) 0.00 - 0.04 K/UL    DF AUTOMATED     METABOLIC PANEL, COMPREHENSIVE    Collection Time: 03/11/23 10:24 AM   Result Value Ref Range    Sodium 126 (L) 136 - 145 mmol/L    Potassium 4.3 3.5 - 5.1 mmol/L    Chloride 96 (L) 97 - 108 mmol/L    CO2 24 21 - 32 mmol/L    Anion gap 6 5 - 15 mmol/L    Glucose 94 65 - 100 mg/dL    BUN 16 6 - 20 mg/dL    Creatinine 0.81 0.55 - 1.02 mg/dL    BUN/Creatinine ratio 20 12 - 20      eGFR >60 >60 ml/min/1.73m2    Calcium 9.0 8.5 - 10.1 mg/dL    Bilirubin, total 0.5 0.2 - 1.0 mg/dL    AST (SGOT) 22 15 - 37 U/L    ALT (SGPT) 29 12 - 78 U/L    Alk. phosphatase 59 45 - 117 U/L    Protein, total 6.6 6.4 - 8.2 g/dL    Albumin 3.1 (L) 3.5 - 5.0 g/dL    Globulin 3.5 2.0 - 4.0 g/dL    A-G Ratio 0.9 (L) 1.1 - 2.2     TROPONIN-HIGH SENSITIVITY    Collection Time: 03/11/23 10:24 AM   Result Value Ref Range    Troponin-High Sensitivity 12 0 - 51 ng/L   CULTURE, BLOOD, PAIRED    Collection Time: 03/11/23 10:24 AM    Specimen: Blood   Result Value Ref Range    Special Requests: No Special Requests      Culture result: No growth after 2 hours     LACTIC ACID    Collection Time: 03/11/23 10:24 AM   Result Value Ref Range    Lactic acid 1.0 0.4 - 2.0 mmol/L   NT-PRO BNP    Collection Time: 03/11/23 10:45 AM   Result Value Ref Range    NT pro-BNP 1,205 (H) <450 pg/mL         Radiologic Studies -   CTA CHEST W OR W WO CONT   Final Result   1. Small left pleural effusion. Debris or carcinomatosis in the left pleural   space. 2. Tight left lower lobe collapse. Narrowing of the left pulmonary arteries. XR CHEST PORT   Final Result   Interval increase in size of small left pleural effusion and left   basilar consolidation.               Non-plain film images such as CT, Ultrasound and MRI are read by the radiologist. Helen Pulido radiographic images are visualized and preliminarily interpreted by the ED Provider with the below findings:    Please see the full medical record for comprehensive list of labs and imaging obtained during the visit. All labs and imaging studies were personally reviewed. My intepretation(s) if applicable are below:     EKG interpretation(s): See ED Course for interpretation of my EKG(s)    Xray Interpretations(s): See ED Course Section for my interpretation of Xray(s)    Cardiac Monitor:  Cardiac Monitor Interpretation:     Rate:  BPM,   Rhythm: Sinus Rhythm    Pulse Oximetry Interpretation: 91% on Room Air      Medical Decision Making and ED Course   - I am the first and primary provider for this patient AND AM THE PRIMARY PROVIDER OF RECORD. - I reviewed the vital signs, available nursing notes, past medical history, past surgical history, family history and social history. - Initial assessment performed. The patients presenting problems have been discussed, and the staff are in agreement with the care plan formulated and outlined with them. I have encouraged them to ask questions as they arise throughout their visit. Vital Signs-Reviewed the patient's vital signs. Patient Vitals for the past 24 hrs:   Temp Pulse Resp BP SpO2   03/11/23 1301 -- 88 24 (!) 147/79 95 %   03/11/23 1242 -- 93 27 -- 95 %   03/11/23 1237 -- -- -- (!) 162/89 (!) 89 %   03/11/23 1202 -- 95 25 (!) 155/98 95 %   03/11/23 1102 -- -- -- -- 96 %   03/11/23 1100 -- 81 24 120/68 --   03/11/23 1039 -- 86 24 -- 97 %   03/11/23 1038 -- 89 22 -- 97 %   03/11/23 1036 -- 89 30 -- 95 %   03/11/23 1033 -- 87 24 -- 97 %   03/11/23 1028 -- -- -- -- 98 %   03/11/23 1025 -- 86 20 (!) 145/76 100 %   03/11/23 1011 98.1 °F (36.7 °C) 90 18 138/77 97 %       Records Reviewed: Prior medical records, Previous Radiology studies, Previous Laboratory studies, Previous EKGs, and Nursing notes    Additional Considerations:     The following Chronic Conditions impacted the patient's care: Lung Ca  because of predisposition to lung infections and respita. Provider Notes (Medical Decision Making):     MDM  Number of Diagnoses or Management Options  COVID-19  History of lung cancer  Hyponatremia  Diagnosis management comments: Patient is an 27-year-old female who presents with a history of lung cancer and COVID infection for shortness of breath. On exam she has minimal tachypnea and respiratory distress. Her lungs have some minimal crackles at the bases. Does not look like she is in respiratory distress. DDx: Bronchitis, bronchospasm, COPD, asthma, acute coronary syndrome, CHF, pleural effusion, pneumothorax, pneumonia, COVID-19, viral illness, etc.    Given COVID infection, history of lung cancer, history of VTE must consider pulmonary embolism so will do CTA chest for both better visualization of her parenchyma as well as rule out a PE. Results reviewed and patient reassessed. Patient had episode of hypoxia while in the ED upon attempted ambulation. Her sodium found to be low at 126. Her CT ruled out PE however demonstrated small pleural effusion and collapse of the right lower lung. After discussion with hospitalist for admission, patient will be admitted for further care. ED Course:         ED Course as of 03/11/23 1501   Sat Mar 11, 2023   1058 EKG interpretation normal sinus rhythm rate of 91 bpm, normal axis there is no ST elevation or ST depression. There is no T wave inversions. There is diminished R wave progression in the anterior precordial leads. Left bundle morphology. Overall normal sinus rhythm with nonspecific changes [SUZETTE]      ED Course User Index  [SUZETTE] Nadine Lo, DO     This appears to be an acute condition.  ------------------------------------------------------------------------------------------------------------        Consultations:       Consultations: -  Hospitalist Consultant: Dr. Dieter Tristan:  We have asked for emergent assistance with regard to this patient. We have discussed the patients HPI, ROS, PE and results this far. They will come and evaluate the patient for admission. See the ED course section, if applicable for details on the content of consultations requested. Procedures and Critical Care       Performed by: Macie Estrada DO    Procedures             CRITICAL CARE NOTE :  10:43 AM  CRITICAL CARE TIME:I have spent 31 minutes of critical care time involved in lab review, consultations with specialist, family decision-making, and documentation. During this entire length of time I was immediately available to the patient. Critical Care: The reason for providing this level of medical care for this critically ill patient was due a critical illness that impaired one or more vital organ systems such that there was a high probability of imminent or life threatening deterioration in the patients condition. This care involved high complexity decision making to assess, manipulate, and support vital system functions, to treat this vital organ system failure and to prevent further life threatening deterioration of the patients condition. Aggregate critical care time is exclusive of any separately billable procedures and teaching time. DO Macie Chirinos DO        Disposition     DISPOSITION: Admit to Floor    ADMISSION: Patient is stable for Admission to the Hospital at this time. Discussed with admitting provider at time of hand off. Diagnosis:   1. Hyponatremia    2. COVID-19    3. History of lung cancer          Disposition: Admitted    Follow-up Information    None         Patient's Medications   Start Taking    No medications on file   Continue Taking    ALBUTEROL-IPRATROPIUM (DUO-NEB) 2.5 MG-0.5 MG/3 ML NEBU    3 mL by Nebulization route every four (4) hours as needed for Wheezing. APIXABAN (ELIQUIS) 2.5 MG TABLET    Take 2.5 mg by mouth two (2) times a day. LISINOPRIL (PRINIVIL, ZESTRIL) 5 MG TABLET    Take 5 mg by mouth daily. Indications: high blood pressure    METOPROLOL TARTRATE (LOPRESSOR) 25 MG TABLET    Take 25 Tablets by mouth two (2) times a day. NEBULIZER & COMPRESSOR MACHINE    1 Each by Does Not Apply route four (4) times daily as needed for Wheezing or Shortness of Breath. These Medications have changed    No medications on file   Stop Taking    BUDESONIDE-FORMOTEROL (SYMBICORT) 160-4.5 MCG/ACTUATION HFAA    Take 2 Puffs by inhalation two (2) times a day. Diagnosis     Clinical Impression:   1. Hyponatremia    2. COVID-19    3. History of lung cancer        Attestations:    Lauren Hills, DO    Please note that this dictation was completed with [employer], the [employer] voice recognition software. Quite often unanticipated grammatical, syntax, homophones, and other interpretive errors are inadvertently transcribed by the computer software. Please disregard these errors. Please excuse any errors that have escaped final proofreading. Thank you.

## 2023-03-11 NOTE — ED NOTES
Assisted pt with ambulation to/from bathroom. Pt became tachypneic with increased WOB. Upon return to room, pt was noted to have SpO2 of 88% on RA.  SpO2 came up to 95% after short period of rest.

## 2023-03-12 LAB
ANION GAP SERPL CALC-SCNC: 6 MMOL/L (ref 5–15)
BASOPHILS # BLD: 0 K/UL (ref 0–0.1)
BASOPHILS NFR BLD: 0 % (ref 0–1)
BUN SERPL-MCNC: 23 MG/DL (ref 6–20)
BUN/CREAT SERPL: 19 (ref 12–20)
CA-I BLD-MCNC: 8.8 MG/DL (ref 8.5–10.1)
CHLORIDE SERPL-SCNC: 99 MMOL/L (ref 97–108)
CO2 SERPL-SCNC: 26 MMOL/L (ref 21–32)
CREAT SERPL-MCNC: 1.18 MG/DL (ref 0.55–1.02)
DIFFERENTIAL METHOD BLD: ABNORMAL
EOSINOPHIL # BLD: 0 K/UL (ref 0–0.4)
EOSINOPHIL NFR BLD: 0 % (ref 0–7)
ERYTHROCYTE [DISTWIDTH] IN BLOOD BY AUTOMATED COUNT: 14 % (ref 11.5–14.5)
GLUCOSE SERPL-MCNC: 178 MG/DL (ref 65–100)
HCT VFR BLD AUTO: 37.2 % (ref 35–47)
HGB BLD-MCNC: 12.6 G/DL (ref 11.5–16)
IMM GRANULOCYTES # BLD AUTO: 0.1 K/UL (ref 0–0.04)
IMM GRANULOCYTES NFR BLD AUTO: 1 % (ref 0–0.5)
LYMPHOCYTES # BLD: 0.6 K/UL (ref 0.8–3.5)
LYMPHOCYTES NFR BLD: 6 % (ref 12–49)
MCH RBC QN AUTO: 29.1 PG (ref 26–34)
MCHC RBC AUTO-ENTMCNC: 33.9 G/DL (ref 30–36.5)
MCV RBC AUTO: 85.9 FL (ref 80–99)
MONOCYTES # BLD: 0.1 K/UL (ref 0–1)
MONOCYTES NFR BLD: 1 % (ref 5–13)
NEUTS SEG # BLD: 8.7 K/UL (ref 1.8–8)
NEUTS SEG NFR BLD: 92 % (ref 32–75)
NRBC # BLD: 0 K/UL (ref 0–0.01)
NRBC BLD-RTO: 0 PER 100 WBC
PLATELET # BLD AUTO: 310 K/UL (ref 150–400)
PMV BLD AUTO: 10 FL (ref 8.9–12.9)
POTASSIUM SERPL-SCNC: 3.3 MMOL/L (ref 3.5–5.1)
RBC # BLD AUTO: 4.33 M/UL (ref 3.8–5.2)
SODIUM SERPL-SCNC: 131 MMOL/L (ref 136–145)
WBC # BLD AUTO: 9.4 K/UL (ref 3.6–11)

## 2023-03-12 PROCEDURE — 36415 COLL VENOUS BLD VENIPUNCTURE: CPT

## 2023-03-12 PROCEDURE — 74011250636 HC RX REV CODE- 250/636: Performed by: PHYSICIAN ASSISTANT

## 2023-03-12 PROCEDURE — 65270000029 HC RM PRIVATE

## 2023-03-12 PROCEDURE — 74011250636 HC RX REV CODE- 250/636: Performed by: INTERNAL MEDICINE

## 2023-03-12 PROCEDURE — 74011000250 HC RX REV CODE- 250: Performed by: PHYSICIAN ASSISTANT

## 2023-03-12 PROCEDURE — 74011250637 HC RX REV CODE- 250/637: Performed by: INTERNAL MEDICINE

## 2023-03-12 PROCEDURE — 85025 COMPLETE CBC W/AUTO DIFF WBC: CPT

## 2023-03-12 PROCEDURE — 74011250637 HC RX REV CODE- 250/637: Performed by: PHYSICIAN ASSISTANT

## 2023-03-12 PROCEDURE — 74011250636 HC RX REV CODE- 250/636: Performed by: HOSPITALIST

## 2023-03-12 PROCEDURE — 80048 BASIC METABOLIC PNL TOTAL CA: CPT

## 2023-03-12 RX ORDER — POTASSIUM CHLORIDE 750 MG/1
40 TABLET, FILM COATED, EXTENDED RELEASE ORAL ONCE
Status: COMPLETED | OUTPATIENT
Start: 2023-03-12 | End: 2023-03-12

## 2023-03-12 RX ORDER — POTASSIUM CHLORIDE 750 MG/1
20 TABLET, FILM COATED, EXTENDED RELEASE ORAL DAILY
COMMUNITY

## 2023-03-12 RX ORDER — OLANZAPINE 10 MG/1
5 INJECTION, POWDER, LYOPHILIZED, FOR SOLUTION INTRAMUSCULAR ONCE
Status: COMPLETED | OUTPATIENT
Start: 2023-03-12 | End: 2023-03-12

## 2023-03-12 RX ORDER — CHOLECALCIFEROL (VITAMIN D3) 125 MCG
CAPSULE ORAL
COMMUNITY

## 2023-03-12 RX ORDER — LEVOTHYROXINE SODIUM 88 UG/1
TABLET ORAL
COMMUNITY

## 2023-03-12 RX ORDER — FOLIC ACID 1 MG/1
1 TABLET ORAL DAILY
COMMUNITY

## 2023-03-12 RX ORDER — FERROUS SULFATE 137(45) MG
TABLET, EXTENDED RELEASE ORAL
COMMUNITY

## 2023-03-12 RX ADMIN — SODIUM CHLORIDE, PRESERVATIVE FREE 10 ML: 5 INJECTION INTRAVENOUS at 14:37

## 2023-03-12 RX ADMIN — METOPROLOL TARTRATE 25 MG: 25 TABLET, FILM COATED ORAL at 11:12

## 2023-03-12 RX ADMIN — APIXABAN 2.5 MG: 2.5 TABLET, FILM COATED ORAL at 20:12

## 2023-03-12 RX ADMIN — SODIUM CHLORIDE, PRESERVATIVE FREE 10 ML: 5 INJECTION INTRAVENOUS at 06:40

## 2023-03-12 RX ADMIN — METOPROLOL TARTRATE 25 MG: 25 TABLET, FILM COATED ORAL at 20:12

## 2023-03-12 RX ADMIN — SODIUM CHLORIDE, PRESERVATIVE FREE 10 ML: 5 INJECTION INTRAVENOUS at 23:05

## 2023-03-12 RX ADMIN — APIXABAN 2.5 MG: 2.5 TABLET, FILM COATED ORAL at 11:12

## 2023-03-12 RX ADMIN — OXYCODONE HYDROCHLORIDE AND ACETAMINOPHEN 500 MG: 500 TABLET ORAL at 20:12

## 2023-03-12 RX ADMIN — GUAIFENESIN 600 MG: 600 TABLET, EXTENDED RELEASE ORAL at 20:12

## 2023-03-12 RX ADMIN — METHYLPREDNISOLONE SODIUM SUCCINATE 40 MG: 40 INJECTION, POWDER, FOR SOLUTION INTRAMUSCULAR; INTRAVENOUS at 11:12

## 2023-03-12 RX ADMIN — ZINC SULFATE 220 MG (50 MG) CAPSULE 1 CAPSULE: CAPSULE at 11:12

## 2023-03-12 RX ADMIN — METHYLPREDNISOLONE SODIUM SUCCINATE 40 MG: 40 INJECTION, POWDER, FOR SOLUTION INTRAMUSCULAR; INTRAVENOUS at 01:03

## 2023-03-12 RX ADMIN — LISINOPRIL 5 MG: 5 TABLET ORAL at 11:11

## 2023-03-12 RX ADMIN — AZITHROMYCIN MONOHYDRATE 500 MG: 500 INJECTION, POWDER, LYOPHILIZED, FOR SOLUTION INTRAVENOUS at 14:32

## 2023-03-12 RX ADMIN — POTASSIUM CHLORIDE 40 MEQ: 750 TABLET, EXTENDED RELEASE ORAL at 14:37

## 2023-03-12 RX ADMIN — METHYLPREDNISOLONE SODIUM SUCCINATE 40 MG: 40 INJECTION, POWDER, FOR SOLUTION INTRAMUSCULAR; INTRAVENOUS at 06:40

## 2023-03-12 RX ADMIN — OLANZAPINE 5 MG: 10 INJECTION, POWDER, LYOPHILIZED, FOR SOLUTION INTRAMUSCULAR at 23:02

## 2023-03-12 RX ADMIN — OXYCODONE HYDROCHLORIDE AND ACETAMINOPHEN 500 MG: 500 TABLET ORAL at 11:12

## 2023-03-12 RX ADMIN — GUAIFENESIN 600 MG: 600 TABLET, EXTENDED RELEASE ORAL at 11:12

## 2023-03-12 NOTE — ROUTINE PROCESS
Bedside and Verbal shift change report given to Thania Arora (oncoming nurse) by Ana Marinelli (offgoing nurse). Report included the following information SBAR and MAR.

## 2023-03-12 NOTE — PROGRESS NOTES
Problem: Airway Clearance - Ineffective  Goal: Achieve or maintain patent airway  Outcome: Progressing Towards Goal     Problem: Gas Exchange - Impaired  Goal: Absence of hypoxia  Outcome: Progressing Towards Goal  Goal: Promote optimal lung function  Outcome: Progressing Towards Goal     Problem: Breathing Pattern - Ineffective  Goal: Ability to achieve and maintain a regular respiratory rate  Outcome: Progressing Towards Goal     Problem: Body Temperature -  Risk of, Imbalanced  Goal: Ability to maintain a body temperature within defined limits  Outcome: Progressing Towards Goal  Goal: Will regain or maintain usual level of consciousness  Outcome: Progressing Towards Goal  Goal: Complications related to the disease process, condition or treatment will be avoided or minimized  Outcome: Progressing Towards Goal     Problem: Isolation Precautions - Risk of Spread of Infection  Goal: Prevent transmission of infectious organism to others  Outcome: Progressing Towards Goal     Problem: Nutrition Deficits  Goal: Optimize nutrtional status  Outcome: Progressing Towards Goal     Problem: Risk for Fluid Volume Deficit  Goal: Maintain normal heart rhythm  Outcome: Progressing Towards Goal  Goal: Maintain absence of muscle cramping  Outcome: Progressing Towards Goal  Goal: Maintain normal serum potassium, sodium, calcium, phosphorus, and pH  Outcome: Progressing Towards Goal     Problem: Loneliness or Risk for Loneliness  Goal: Demonstrate positive use of time alone when socialization is not possible  Outcome: Progressing Towards Goal     Problem: Fatigue  Goal: Verbalize increase energy and improved vitality  Outcome: Progressing Towards Goal     Problem: Patient Education: Go to Patient Education Activity  Goal: Patient/Family Education  Outcome: Progressing Towards Goal     Problem: Falls - Risk of  Goal: *Absence of Falls  Description: Document Tori Fall Risk and appropriate interventions in the flowsheet.   Outcome: Progressing Towards Goal  Note: Fall Risk Interventions:                                Problem: Patient Education: Go to Patient Education Activity  Goal: Patient/Family Education  Outcome: Progressing Towards Goal       Patient alert and oriented to self. Patient is on room air, ambulates independently and tolerates meals. Patient does at time require redirection and remains on contact plus. IV antibiotics continued.

## 2023-03-12 NOTE — ROUTINE PROCESS
Bedside and Verbal shift change report given to Lia Mitchell (oncoming nurse) by Pal Sanchez (offgoing nurse). Report included the following information SBAR and MAR.

## 2023-03-12 NOTE — PROGRESS NOTES
Reason for Admission:                       RUR Score:                     Plan for utilizing home health:          PCP: First and Last name:  Ahsan Reyes MD     Name of Practice:    Are you a current patient: Yes/No:    Approximate date of last visit:    Can you participate in a virtual visit with your PCP:                     Current Advanced Directive/Advance Care Plan: Full Code      Healthcare Decision Maker:   Click here to complete [id number] including selection of the Healthcare Decision Maker Relationship (ie \"Primary\")                             Transition of Care Plan:                    Patient lives alone in her home with 2 steps to enter. Patient is alert and oriented. Patient's son and daughter come to visit all the time. Patient has a shower chair. No hx of HH, SNF, or rehab. Patient's daughter will transport home. CM will continue to follow.

## 2023-03-12 NOTE — PROGRESS NOTES
Hospitalist Progress Note    Subjective:   Daily Progress Note: 3/12/2023 9:16 AM    Hospital Course: Jimmy Early is a 80 y.o. female with a history of COPD, lung cancer, hypothyroidism that presented to the emergency room on 3/11/2023 for 1 week history of worsening shortness of breath. Patient's pulmonologist is Dr. Jay Han. Oncologist is Dr. Kirill Rocha. Patient tested positive for COVID on 3/4/2023. Lola Nogueira She called her primary care and was given Augmentin p.o. however per daughter at bedside says that over the course the last week she has had worsening shortness of breath, wheezing, nonproductive cough. In the ED patient was found to be acutely hypoxic with an oxygen saturation of 88. She was placed on oxygen nasal cannula. Then weaned off quickly. Chest x-ray showed interval increase in size of small left pleural effusion and left basilar consolidation. CTA of the chest showed small left pleural effusion, debris or carcinomatosis in the left pleural space, trace left lower lobe collapse, narrowing of the left pulmonary arteries. Laboratory data was significant for a BNP of 1205. Troponin x1 negative. Sodium 126. WBC 12.2. Rapid COVID is pending. Was requested admission for hyponatremia, COVID-positive, increasing shortness of breath. Started on fluid restriction and IV Lasix 40 mg daily. Also started on IV Solu-Medrol and IV azithromycin. Tessalon as needed for cough. Pulmonology and oncology consulted      Subjective: Patient overall says that she is feeling better. Still with some wheezing. Nonproductive cough.     Current Facility-Administered Medications   Medication Dose Route Frequency    apixaban (ELIQUIS) tablet 2.5 mg  2.5 mg Oral BID    lisinopriL (PRINIVIL, ZESTRIL) tablet 5 mg  5 mg Oral DAILY    metoprolol tartrate (LOPRESSOR) tablet 25 mg  25 mg Oral BID    benzonatate (TESSALON) capsule 100 mg  100 mg Oral TID PRN    methylPREDNISolone (PF) (SOLU-MEDROL) injection 40 mg  40 mg IntraVENous Q6H    ascorbic acid (vitamin C) (VITAMIN C) tablet 500 mg  500 mg Oral BID    sodium chloride (NS) flush 5-40 mL  5-40 mL IntraVENous Q8H    sodium chloride (NS) flush 5-40 mL  5-40 mL IntraVENous PRN    acetaminophen (TYLENOL) tablet 650 mg  650 mg Oral Q6H PRN    Or    acetaminophen (TYLENOL) suppository 650 mg  650 mg Rectal Q6H PRN    polyethylene glycol (MIRALAX) packet 17 g  17 g Oral DAILY PRN    ondansetron (ZOFRAN ODT) tablet 4 mg  4 mg Oral Q8H PRN    Or    ondansetron (ZOFRAN) injection 4 mg  4 mg IntraVENous Q6H PRN    azithromycin (ZITHROMAX) 500 mg in 0.9% sodium chloride 250 mL ([id number])  500 mg IntraVENous Q24H    zinc sulfate (ZINCATE) 50 mg zinc (220 mg) capsule 1 Capsule  1 Capsule Oral DAILY    guaiFENesin ER (MUCINEX) tablet 600 mg  600 mg Oral Q12H    albuterol (PROVENTIL HFA, VENTOLIN HFA, PROAIR HFA) inhaler 2 Puff  2 Puff Inhalation Q4H PRN        Review of Systems  Constitutional: No fevers, No chills, No sweats, No fatigue, No Weakness  Eyes: No redness  Ears, nose, mouth, throat, and face: No nasal congestion, No sore throat, No voice change  Respiratory: + Shortness of Breath, + cough, + wheezing  Cardiovascular: No chest pain, No palpitations, No extremity edema  Gastrointestinal: No nausea, No vomiting, No diarrhea, No abdominal pain  Genitourinary: No frequency, No dysuria, No hematuria  Integument/breast: No skin lesion(s)   Neurological: No Confusion, No headaches, No dizziness      Objective:     Visit Vitals  /73 (BP 1 Location: Right upper arm, BP Patient Position: At rest)   Pulse 92   Temp 98 °F (36.7 °C)   Resp 20   Ht 5' 3\" (1.6 m)   Wt 49 kg (108 lb)   SpO2 95%   BMI 19.13 kg/m²      O2 Device: None (Room air)    Temp (24hrs), Av.1 °F (36.7 °C), Min:98 °F (36.7 °C), Max:98.1 °F (36.7 °C)      No intake/output data recorded. No intake/output data recorded.     PHYSICAL EXAM:  Constitutional: No acute distress  Skin: Extremities and face reveal no rashes. HEENT: Sclerae anicteric. Extra-occular muscles are intact. No oral ulcers. The neck is supple and no masses. Cardiovascular: Regular rate and rhythm. Respiratory:  audible wheeze, nonlabored  GI: Abdomen nondistended, soft, and nontender. Normal active bowel sounds. Musculoskeletal: No pitting edema of the lower legs. Able to move all ext  Neurological:  Patient is alert and oriented. Cranial nerves II-XII grossly intact  Psychiatric: Mood appears appropriate       Data Review    Recent Results (from the past 24 hour(s))   EKG, 12 LEAD, INITIAL    Collection Time: 03/11/23 10:14 AM   Result Value Ref Range    Ventricular Rate 91 BPM    Atrial Rate 91 BPM    P-R Interval 150 ms    QRS Duration 118 ms    Q-T Interval 398 ms    QTC Calculation (Bezet) 489 ms    Calculated P Axis 58 degrees    Calculated R Axis 15 degrees    Calculated T Axis 68 degrees    Diagnosis       Normal sinus rhythm  Low voltage QRS  Cannot rule out Anterior infarct , age undetermined  Abnormal ECG  No previous ECGs available  Confirmed by Anabel Kirkpatrick ([id number]) on 3/11/2023 5:54:32 PM     CBC WITH AUTOMATED DIFF    Collection Time: 03/11/23 10:24 AM   Result Value Ref Range    WBC 12.2 (H) 3.6 - 11.0 K/uL    RBC 3.76 (L) 3.80 - 5.20 M/uL    HGB 11.1 (L) 11.5 - 16.0 g/dL    HCT 32.8 (L) 35.0 - 47.0 %    MCV 87.2 80.0 - 99.0 FL    MCH 29.5 26.0 - 34.0 PG    MCHC 33.8 30.0 - 36.5 g/dL    RDW 14.2 11.5 - 14.5 %    PLATELET 252 263 - 201 K/uL    MPV 9.1 8.9 - 12.9 FL    NRBC 0.0 0.0  WBC    ABSOLUTE NRBC 0.00 0.00 - 0.01 K/uL    NEUTROPHILS 82 (H) 32 - 75 %    LYMPHOCYTES 9 (L) 12 - 49 %    MONOCYTES 7 5 - 13 %    EOSINOPHILS 1 0 - 7 %    BASOPHILS 0 0 - 1 %    IMMATURE GRANULOCYTES 1 (H) 0 - 0.5 %    ABS. NEUTROPHILS 10.1 (H) 1.8 - 8.0 K/UL    ABS. LYMPHOCYTES 1.1 0.8 - 3.5 K/UL    ABS. MONOCYTES 0.9 0.0 - 1.0 K/UL    ABS. EOSINOPHILS 0.1 0.0 - 0.4 K/UL    ABS. BASOPHILS 0.0 0.0 - 0.1 K/UL    ABS. IMM.  GRANS. 0.1 (H) 0.00 - 0.04 K/UL    DF AUTOMATED     METABOLIC PANEL, COMPREHENSIVE    Collection Time: 03/11/23 10:24 AM   Result Value Ref Range    Sodium 126 (L) 136 - 145 mmol/L    Potassium 4.3 3.5 - 5.1 mmol/L    Chloride 96 (L) 97 - 108 mmol/L    CO2 24 21 - 32 mmol/L    Anion gap 6 5 - 15 mmol/L    Glucose 94 65 - 100 mg/dL    BUN 16 6 - 20 mg/dL    Creatinine 0.81 0.55 - 1.02 mg/dL    BUN/Creatinine ratio 20 12 - 20      eGFR >60 >60 ml/min/1.73m2    Calcium 9.0 8.5 - 10.1 mg/dL    Bilirubin, total 0.5 0.2 - 1.0 mg/dL    AST (SGOT) 22 15 - 37 U/L    ALT (SGPT) 29 12 - 78 U/L    Alk. phosphatase 59 45 - 117 U/L    Protein, total 6.6 6.4 - 8.2 g/dL    Albumin 3.1 (L) 3.5 - 5.0 g/dL    Globulin 3.5 2.0 - 4.0 g/dL    A-G Ratio 0.9 (L) 1.1 - 2.2     TROPONIN-HIGH SENSITIVITY    Collection Time: 03/11/23 10:24 AM   Result Value Ref Range    Troponin-High Sensitivity 12 0 - 51 ng/L   LACTIC ACID    Collection Time: 03/11/23 10:24 AM   Result Value Ref Range    Lactic acid 1.0 0.4 - 2.0 mmol/L   NT-PRO BNP    Collection Time: 03/11/23 10:45 AM   Result Value Ref Range    NT pro-BNP 1,205 (H) <450 pg/mL   CULTURE, BLOOD, PAIRED    Collection Time: 03/11/23 11:24 AM    Specimen: Blood   Result Value Ref Range    Special Requests: No Special Requests      Culture result: No growth after 22 hours     COVID-19 RAPID TEST    Collection Time: 03/11/23  5:00 PM   Result Value Ref Range    COVID-19 rapid test DETECTED (A) Not Detected         Radiology review: CTA Chest    Assessment:   1. Acute hypoxic respiratory failure secondary to COVID  2. Left pleural effusions  3. History of lung cancer  4. Hypothyroidism  5. Hyponatremia  6. Hypertension    Plan:    1. Patient found acutely hypoxic in the emergency room. Successfully weaned off of oxygen in the ER. She does not use oxygen at home. Chest x-ray showed signs concerning for worsening pleural effusions.   CTA of the chest shows no consolidation however patient is at increased risk due to her COVID and lung cancer. On IV azithromycin along with IV steroids. Albuterol 2 puffs every 4 hours. Consult pulmonology  2. BNP is elevated. Troponin x1 negative. Pleural effusions are present. Given IV Lasix 40 mg once. Repeat BMP in the a.m. 3.  Consult patient's oncologist  4. On Synthroid  5. Sodium levels low at 126. Fluid restriction and give a dose of IV Lasix 40 mg.  Repeat pending for today  6. Blood pressure is stable. Continue to monitor per unit protocol. Cardiac telemetry. On lisinopril 5 mg daily along with metoprolol 25 mg twice daily. Monitor for signs of hypotension  7. CBC BMP in a.m. Dispo: 24 hours. Barriers include improvement in lung function as patient is high risk for complications due to lung cancer and immunocompromised and improvement in sodium. Home with no needs     Spoke to daughter Vira Eng. Answered all questions and update her on further care and plan. CODE STATUS Full     DVT prophylaxis: Eliquis   Ulcer prophylaxis: Not indicated     Care Plan discussed with: Patient/Family, Nurse, and     Total time spent with patient: 34 minutes.

## 2023-03-12 NOTE — PROGRESS NOTES
Pulmonary Progress Note      NAME: Paulina Holm   :  1940  MRM:  [id number]    Date/Time: 3/12/2023  6:23 PM         Subjective:     Patient seen and examined in her room. She appears to be somewhat confused. However she is ambulating in her room on room air. She feels better. No significant sputum production. Dyspnea has improved. She is hoping to go home today. Oncology consulted and input appreciated. Past Medical History reviewed and unchanged from Admission History and Physical       Objective:     Physical Exam     Vitals:      Last 24hrs VS reviewed since prior progress note. Most recent are:    Visit Vitals  /66 (BP 1 Location: Right upper arm)   Pulse 94   Temp 98.3 °F (36.8 °C)   Resp 20   Ht 5' 3\" (1.6 m)   Wt 49 kg (108 lb)   SpO2 96%   BMI 19.13 kg/m²     SpO2 Readings from Last 6 Encounters:   23 96%   22 98%   22 95%        No intake or output data in the 24 hours ending 23     Physical Exam:  Patient is a elderly white female who is on room air. Not in any distress. Vital signs as above. HEENT examination show pupils are equal and reactive to light. No significant pallor. No icterus. Nasal passages are patent. Oropharynx is without thrush. Neck is supple and trachea central.  No JVD or lymphadenopathy. She is not using accessory muscles of respiration. Chest is symmetrical with equal and fair air entry bilaterally. She is diminished breath sounds over the left lung base with some crackles. Occasional scattered wheezes,  Improved. No chest wall tenderness. Rhythm is regular without any loud murmur or gallop. Sinus tachycardia intermittently is noted on the monitor. Abdomen is obese and benign. Bowel sounds audible. No masses or organomegaly. Extremities do not show any cyanosis or clubbing. No significant pitting edema. Pulses are palpable. Neurologic system examination is grossly intact. Skin is warm and dry.     CTA CHEST W OR W WO CONT   Final Result   1. Small left pleural effusion. Debris or carcinomatosis in the left pleural   space. 2. Tight left lower lobe collapse. Narrowing of the left pulmonary arteries. XR CHEST PORT   Final Result   Interval increase in size of small left pleural effusion and left   basilar consolidation.               Lab Data Reviewed: (see below)      Medications:  Current Facility-Administered Medications   Medication Dose Route Frequency    apixaban (ELIQUIS) tablet 2.5 mg  2.5 mg Oral BID    lisinopriL (PRINIVIL, ZESTRIL) tablet 5 mg  5 mg Oral DAILY    metoprolol tartrate (LOPRESSOR) tablet 25 mg  25 mg Oral BID    benzonatate (TESSALON) capsule 100 mg  100 mg Oral TID PRN    methylPREDNISolone (PF) (SOLU-MEDROL) injection 40 mg  40 mg IntraVENous Q6H    ascorbic acid (vitamin C) (VITAMIN C) tablet 500 mg  500 mg Oral BID    sodium chloride (NS) flush 5-40 mL  5-40 mL IntraVENous Q8H    sodium chloride (NS) flush 5-40 mL  5-40 mL IntraVENous PRN    acetaminophen (TYLENOL) tablet 650 mg  650 mg Oral Q6H PRN    Or    acetaminophen (TYLENOL) suppository 650 mg  650 mg Rectal Q6H PRN    polyethylene glycol (MIRALAX) packet 17 g  17 g Oral DAILY PRN    ondansetron (ZOFRAN ODT) tablet 4 mg  4 mg Oral Q8H PRN    Or    ondansetron (ZOFRAN) injection 4 mg  4 mg IntraVENous Q6H PRN    azithromycin (ZITHROMAX) 500 mg in 0.9% sodium chloride 250 mL ([id number])  500 mg IntraVENous Q24H    zinc sulfate (ZINCATE) 50 mg zinc (220 mg) capsule 1 Capsule  1 Capsule Oral DAILY    guaiFENesin ER (MUCINEX) tablet 600 mg  600 mg Oral Q12H    albuterol (PROVENTIL HFA, VENTOLIN HFA, PROAIR HFA) inhaler 2 Puff  2 Puff Inhalation Q4H PRN       ______________________________________________________________________      Lab Review:     Recent Labs     03/12/23  0942 03/11/23  1024   WBC 9.4 12.2*   HGB 12.6 11.1*   HCT 37.2 32.8*    232     Recent Labs     03/12/23  0942 03/11/23  1024   * 126*   K 3.3* 4.3   CL 99 96*   CO2 26 24   * 94   BUN 23* 16   CREA 1.18* 0.81   CA 8.8 9.0   ALB  --  3.1*   ALT  --  29     No components found for: GLPOC  No results for input(s): PH, PCO2, PO2, HCO3, FIO2 in the last 72 hours. No results for input(s): INR, INREXT, INREXT in the last 72 hours. Other pertinent lab:   Laboratory data on admit:  Portable chest x-ray done 3/11 shows slight increase in the left pleural effusion as compared to a prior study of January 2022. CTA of the chest was obtained 3/11/23. No pulmonary embolism is seen, personally reviewed. However the report does not mention specifically the absence of pulmonary embolism. There is tight collapse of the left lower lobe described. According to the report the left lower lobe pulmonary artery is narrowed. There is a small left pleural effusion with possible debris in it. She has chronic left-sided rib fractures. There are no comparison. There is mild mosaic attenuation of the right lung. Electrolytes are within normal limits. BUN is 16 creatinine is 1.81. Blood glucose of 94. WBC count 12.2 with neutrophils of 82% hemoglobin is 11.1 and a platelet count of 409. Lactic acid 1.0 troponin 12. BNP is 1/2/2005. Rapid COVID test is positive. Assessment & Plan:      1. The patient has COVID-19. CT of the chest does not show any focal pneumonia. There is some debris noted in the lingular bronchus. There are changes of chronic left pleural effusion with left lower lobe atelectasis/collapse which is related to her history of lung cancer. She recently completed a course of Augmentin. Agree with starting her on azithromycin for atypical coverage and IV Solu-Medrol. I added guaifenesin. Continue with albuterol inhaler on a as needed basis. She is started on ascorbic acid and zinc.  She is currently on room air. She does not need any specific treatment for COVID-19. She has been fully vaccinated. 2.  History of lung cancer. Status posttreatment but it appears to be recurrent. She had a PET scan done on 2/28/2023 at [facility] which showed partial response. Input from oncology appreciated. She is on second line treatment with weekly Taxotere and has been tolerating it well. 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 3.  History of DVT. She is on Eliquis 2.5 mg twice daily. 4.  History of cardiomyopathy with a EF of 25% according to the daughter. Echocardiogram would be helpful. Thank you for allowing me to participate in the care of this patient. I will follow the patient closely with you. Discussed with nursing staff. She is walking around in the room on room air. She appears to be somewhat confused today. More than 30 minutes spent with patient care.      Tracey Lennon MD

## 2023-03-12 NOTE — PROGRESS NOTES
Two person skin assessment done with Lala Momin LPN No open areas noted. Redness noted to extremities. Scattered bruises noted.

## 2023-03-12 NOTE — CONSULTS
Hematology/Oncology Consult    Patient: Samina Mckeon MRN: [id number]     YOB: 1940  Age: 80 y.o. Sex: female      HPI      Chief Complaint   Patient presents with    Shortness of Breath    Positive For Covid-19       Samina Mckeon is a 80 y.o. female who is being seen for stage IV non-small cell lung cancer. Ms. Sangita Avelar is a pleasant 59-year-old female known to me for past several years. She initially had a stage IIB, unresectable non-small cell lung cancer treated with concurrent chemoradiation followed by a year of immunotherapy. She did well but unfortunately developed recurrent cancer about 6 months ago and is receiving systemic chemotherapy. She had progressive disease on first-line treatment and is now on second line weekly docetaxel. Last chemo was given more than 3 to 4 weeks ago. Chemo was held as her daughter and several family members were main supportive team has tested positive so patient was asked to rest at home. Her most recent PET scan showed response to her cancer but she had developed some pneumonia. She was treated with outpatient Augmentin, but she tested positive for COVID-19 last week    She was brought into the hospital by her daughter due to her significant shortness of breath weakness. Here in the ER she tested positive for COVID-19 still. She is on room air. Looking comfortable without any distress. She however does have wheezing cough and increased sputum and was feeling intermittently weak. Repeat CT scan of the chest was done here. In December 2022 she had influenza infection and around the same time she also developed congestive heart failure with significant drop in her EF and required diuretics and cardiac meds. She has remote history of DVT and is on Eliquis 2.5 twice daily. Past Medical History:   Diagnosis Date    Chronic obstructive pulmonary disease ([facility]     Hypertension     Lung cancer ([facility]      History reviewed.  No pertinent surgical history. No family history on file.   Social History     Tobacco Use    Smoking status: Never    Smokeless tobacco: Never   Substance Use Topics    Alcohol use: Not Currently      Current Facility-Administered Medications   Medication Dose Route Frequency Provider Last Rate Last Admin    apixaban (ELIQUIS) tablet 2.5 mg  2.5 mg Oral BID Anamaria Otero PA-C   2.5 mg at 03/12/23 1112    lisinopriL (PRINIVIL, ZESTRIL) tablet 5 mg  5 mg Oral DAILY Anamaria Otero PA-C   5 mg at 03/12/23 1111    metoprolol tartrate (LOPRESSOR) tablet 25 mg  25 mg Oral BID Anamaria Otero PA-C   25 mg at 03/12/23 1112    benzonatate (TESSALON) capsule 100 mg  100 mg Oral TID PRN Anamaria Otero PA-C        methylPREDNISolone (PF) (SOLU-MEDROL) injection 40 mg  40 mg IntraVENous Q6H Anamaria Otero PA-C   40 mg at 03/12/23 1112    ascorbic acid (vitamin C) (VITAMIN C) tablet 500 mg  500 mg Oral BID Anamaria Otero PA-C   500 mg at 03/12/23 1112    sodium chloride (NS) flush 5-40 mL  5-40 mL IntraVENous Q8H Anamaria Otero PA-C   10 mL at 03/12/23 1437    sodium chloride (NS) flush 5-40 mL  5-40 mL IntraVENous PRN Anamaria Otero PA-C        acetaminophen (TYLENOL) tablet 650 mg  650 mg Oral Q6H PRN Anamaria Otero PA-C        Or    acetaminophen (TYLENOL) suppository 650 mg  650 mg Rectal Q6H PRN Anamaria Otero PA-C        polyethylene glycol (MIRALAX) packet 17 g  17 g Oral DAILY PRN Anamaria Otero PA-C        ondansetron (ZOFRAN ODT) tablet 4 mg  4 mg Oral Q8H PRN Anamaria Otero PA-C        Or    ondansetron (ZOFRAN) injection 4 mg  4 mg IntraVENous Q6H PRN Anamaria Otero PA-C        azithromycin (ZITHROMAX) 500 mg in 0.9% sodium chloride 250 mL ([id number])  500 mg IntraVENous Q24H Mark, Gualberto TEAGUE  mL/hr at 03/12/23 1432 500 mg at 03/12/23 1432    zinc sulfate (ZINCATE) 50 mg zinc (220 mg) capsule 1 Capsule  1 Capsule Oral DAILY Perico Almeida MD   1 Capsule at 03/12/23 1112    guaiFENesin ER (MUCINEX) tablet 600 mg  600 mg Oral Q12H Keon Morrison MD   600 mg at 03/12/23 1112    albuterol (PROVENTIL HFA, VENTOLIN HFA, PROAIR HFA) inhaler 2 Puff  2 Puff Inhalation Q4H PRN Radha Silav MD            No Known Allergies    Review of Systems:  Constitutional No fevers, chills, night sweats, positive for excessive fatigue or weight loss. Allergic/Immunologic No recent allergic reactions   Eyes No significant visual difficulties. No diplopia. ENMT No problems with hearing, no sore throat, no sinus drainage. Endocrine No hot flashes or night sweats. No cold intolerance, polyuria, or polydipsia   Hematologic/Lymphatic No easy bruising or bleeding. The patient denies any tender or palpable lymph nodes   Breasts Positive for abnormal masses of breast, nipple discharge or pain. Respiratory No dyspnea on exertion, orthopnea, chest pain, cough or hemoptysis. Cardiovascular No anginal chest pain, irregular heart beat, tachycardia, palpitations or orthopnea. Gastrointestinal No nausea, vomiting, diarrhea, constipation, cramping, dysphagia, reflux, heartburn, GI bleeding, or early satiety. No change in bowel habits. Genitourinary (M) No hematuria, dysuria, increased frequency, urgency, hesitancy or incontinence. Musculoskeletal No joint pain, swelling or redness. No decreased range of motion. Integumentary No chronic rashes, inflammation, ulcerations, pruritus, petechiae, purpura, ecchymoses, or skin changes. Neurologic No headache, blurred vision, and no areas of focal weakness or numbness. Normal gait. No sensory problems. Psychiatric No insomnia, depression, cruz or mood swings. No psychotropic drugs.          Objective:     Vitals:    03/11/23 1758 03/11/23 1901 03/11/23 2029 03/12/23 1111   BP: 131/73 (!) 141/77 131/73 126/66   Pulse: 91 96 92 94   Resp: 20  20 20   Temp:   98 °F (36.7 °C) 98.3 °F (36.8 °C)   SpO2: 95% 96% 95% 96%   Weight:       Height:            Physical Exam:  Constitutional Alert, cooperative, oriented. Mood and affect appropriate. Appears close to chronological age. Well nourished. Well developed. Head Normocephalic; no scars   Eyes Conjunctivae and sclerae are clear and without icterus. Pupils are reactive and equal.   ENMT Sinuses are nontender. No oral exudates, ulcers, masses, thrush or mucositis. Oropharynx clear. Tongue normal.   Neck Supple without masses or thyromegaly. No jugular venous distension. Hematologic/Lymphatic No petechiae or purpura. No tender or palpable lymph nodes in the cervical, supraclavicular, axillary or inguinal area. Respiratory Lungs are clear to auscultation without rhonchi or wheezing. Cardiovascular Regular rate and rhythm of heart without murmurs, gallops or rubs. Chest / Line Site Chest is symmetric with no chest wall deformities. Abdomen Non-tender, non-distended, no masses, ascites or hepatosplenomegaly. Good bowel sounds. No guarding or rebound tenderness. No pulsatile masses. Musculoskeletal No tenderness or swelling, normal range of motion without obvious weakness. Extremities No visible deformities, no cyanosis, clubbing or edema. Skin No rashes, scars, or lesions suggestive of malignancy. No petechiae, purpura, or ecchymoses. No excoriations. Neurologic No sensory or motor deficits, normal cerebellar function, normal gait, cranial nerves intact. Psychiatric Alert and oriented times three. Coherent speech. Verbalizes understanding of our discussions today. Lab/Data Review:  Recent Labs     03/12/23  0942 03/11/23  1024   WBC 9.4 12.2*   HGB 12.6 11.1*   HCT 37.2 32.8*    232     Recent Labs     03/12/23  0942 03/11/23  1024   * 126*   K 3.3* 4.3   CL 99 96*   CO2 26 24   * 94   BUN 23* 16   CREA 1.18* 0.81   CA 8.8 9.0   ALB  --  3.1*   TBILI  --  0.5   ALT  --  29     No results for input(s): PH, PCO2, PO2, HCO3, FIO2 in the last 72 hours.   Recent Results (from the past 24 hour(s))   COVID-19 RAPID TEST    Collection Time: 03/11/23  5:00 PM   Result Value Ref Range    COVID-19 rapid test DETECTED (A) Not Detected     METABOLIC PANEL, BASIC    Collection Time: 03/12/23  9:42 AM   Result Value Ref Range    Sodium 131 (L) 136 - 145 mmol/L    Potassium 3.3 (L) 3.5 - 5.1 mmol/L    Chloride 99 97 - 108 mmol/L    CO2 26 21 - 32 mmol/L    Anion gap 6 5 - 15 mmol/L    Glucose 178 (H) 65 - 100 mg/dL    BUN 23 (H) 6 - 20 mg/dL    Creatinine 1.18 (H) 0.55 - 1.02 mg/dL    BUN/Creatinine ratio 19 12 - 20      eGFR 46 (L) >60 ml/min/1.73m2    Calcium 8.8 8.5 - 10.1 mg/dL   CBC WITH AUTOMATED DIFF    Collection Time: 03/12/23  9:42 AM   Result Value Ref Range    WBC 9.4 3.6 - 11.0 K/uL    RBC 4.33 3.80 - 5.20 M/uL    HGB 12.6 11.5 - 16.0 g/dL    HCT 37.2 35.0 - 47.0 %    MCV 85.9 80.0 - 99.0 FL    MCH 29.1 26.0 - 34.0 PG    MCHC 33.9 30.0 - 36.5 g/dL    RDW 14.0 11.5 - 14.5 %    PLATELET 177 664 - 745 K/uL    MPV 10.0 8.9 - 12.9 FL    NRBC 0.0 0.0  WBC    ABSOLUTE NRBC 0.00 0.00 - 0.01 K/uL    NEUTROPHILS 92 (H) 32 - 75 %    LYMPHOCYTES 6 (L) 12 - 49 %    MONOCYTES 1 (L) 5 - 13 %    EOSINOPHILS 0 0 - 7 %    BASOPHILS 0 0 - 1 %    IMMATURE GRANULOCYTES 1 (H) 0 - 0.5 %    ABS. NEUTROPHILS 8.7 (H) 1.8 - 8.0 K/UL    ABS. LYMPHOCYTES 0.6 (L) 0.8 - 3.5 K/UL    ABS. MONOCYTES 0.1 0.0 - 1.0 K/UL    ABS. EOSINOPHILS 0.0 0.0 - 0.4 K/UL    ABS. BASOPHILS 0.0 0.0 - 0.1 K/UL    ABS. IMM. GRANS. 0.1 (H) 0.00 - 0.04 K/UL    DF AUTOMATED          CTA CHEST W OR W WO CONT    Result Date: 3/11/2023  1. Small left pleural effusion. Debris or carcinomatosis in the left pleural space. 2. Tight left lower lobe collapse. Narrowing of the left pulmonary arteries. XR CHEST PORT    Result Date: 3/11/2023  Interval increase in size of small left pleural effusion and left basilar consolidation.         PET scan 2/28/23:,  since PET/CT 10/25/2022, a new opacity containing air bronchograms has  developed in the superior segment of the right lower lobe. It is  hypermetabolic (max SUV 6.5). The finding may represent pneumonia  though cannot exclude neoplasm. Short-term follow-up recommended. There is otherwise diminished glucose avidity of previously noted left  lung and left pleural lesions. In particular, an area of  hypermetabolic activity in the atelectatic/consolidated left lower  lobe now exhibits a max SUV of 5.7 compared with 11.9 on 10/25/2022. A  focus of glucose avidity in the posterior medial inferior left pleura  is now minimally glucose avid (max SUV 2.9 today compared with 5.1  previously). A small focus of glucose avidity in the posterior medial  superior left pleura has diminished in size and metabolic activity  (max SUV 2.4 today compared with 3.4 previously). Hypermetabolic activity in the rectosigmoid described previously is  not now seen, it presumably having represented physiologic bowel  activity. There is otherwise an anticipated biodistribution of FDG. Calcific atherosclerotic coronary arterial disease is present. Impression:  1. Developing hypermetabolic opacity in the right lower lobe, possibly  pneumonia versus neoplasm. Short-term follow-up recommended. 2. Diminished glucose avidity of previously noted left lung and left  pleural lesions, as described. Assessment and Plan:     Hospital Problems  Never Reviewed            Codes Class Noted POA    [facility] ICD-10-CM: C34.90  ICD-9-CM: 162.9  3/11/2023 Unknown        PNA (pneumonia) ICD-10-CM: J18.9  ICD-9-CM: 486  3/11/2023 Unknown           1. NSCLC favor adenocarcinoma: stage IIB, presented as left lower lobe lung mass in 2018  s/p chemo xrt with weekly carbo/taxol and completed  Imfinzi maintenance treatments on 11/2020. She developed biopsy proven recurrence in the Left lower lobe with malignant pleural effusion in 8/2022. Pleural fluid cytology 8/23/22: positive for METASTATIC ADENOCARCINOMA.   _She receivd first line Ynes Alimta and Keytruda for 3 cycles but developed progressive disease.  -She is switched to second line weekly Taxotere. She is tolerating this well now. -Recent PET scan shows partial response with diminished FDG activity in the left lung and left pleural effusions. 2.  Recent COVID-19 pneumonia: Tested positive last week stool testing positive.  -The acute shortness of breath and pulmonary symptoms are likely related to this. Further treatment as per primary treating.  -She is currently on room air and is not in any major distress. 3.  History of CHF diagnosed in December 2022 with EF of 20 to 25%. -Off diuretics is followed by Dr. Nba Daigle cardiology. 4.  Remote history of DVT on long-term Eliquis 2.5 twice daily:  continue this. Thank you for the consult.     Signed By: Williams Cartagena MD     March 12, 2023

## 2023-03-13 PROCEDURE — 74011250636 HC RX REV CODE- 250/636: Performed by: PHYSICIAN ASSISTANT

## 2023-03-13 PROCEDURE — 74011250636 HC RX REV CODE- 250/636: Performed by: NURSE PRACTITIONER

## 2023-03-13 PROCEDURE — 74011250637 HC RX REV CODE- 250/637: Performed by: PHYSICIAN ASSISTANT

## 2023-03-13 PROCEDURE — 65270000029 HC RM PRIVATE

## 2023-03-13 PROCEDURE — 74011000250 HC RX REV CODE- 250: Performed by: PHYSICIAN ASSISTANT

## 2023-03-13 PROCEDURE — 97165 OT EVAL LOW COMPLEX 30 MIN: CPT

## 2023-03-13 PROCEDURE — 97161 PT EVAL LOW COMPLEX 20 MIN: CPT

## 2023-03-13 PROCEDURE — 74011250637 HC RX REV CODE- 250/637: Performed by: INTERNAL MEDICINE

## 2023-03-13 PROCEDURE — 74011250636 HC RX REV CODE- 250/636: Performed by: HOSPITALIST

## 2023-03-13 PROCEDURE — 97530 THERAPEUTIC ACTIVITIES: CPT

## 2023-03-13 PROCEDURE — 74011250636 HC RX REV CODE- 250/636: Performed by: INTERNAL MEDICINE

## 2023-03-13 RX ORDER — OLANZAPINE 10 MG/1
5 INJECTION, POWDER, LYOPHILIZED, FOR SOLUTION INTRAMUSCULAR ONCE
Status: COMPLETED | OUTPATIENT
Start: 2023-03-13 | End: 2023-03-13

## 2023-03-13 RX ADMIN — METOPROLOL TARTRATE 25 MG: 25 TABLET, FILM COATED ORAL at 09:53

## 2023-03-13 RX ADMIN — OXYCODONE HYDROCHLORIDE AND ACETAMINOPHEN 500 MG: 500 TABLET ORAL at 09:53

## 2023-03-13 RX ADMIN — METHYLPREDNISOLONE SODIUM SUCCINATE 40 MG: 40 INJECTION, POWDER, FOR SOLUTION INTRAMUSCULAR; INTRAVENOUS at 21:23

## 2023-03-13 RX ADMIN — APIXABAN 2.5 MG: 2.5 TABLET, FILM COATED ORAL at 09:53

## 2023-03-13 RX ADMIN — METOPROLOL TARTRATE 25 MG: 25 TABLET, FILM COATED ORAL at 21:13

## 2023-03-13 RX ADMIN — APIXABAN 2.5 MG: 2.5 TABLET, FILM COATED ORAL at 21:14

## 2023-03-13 RX ADMIN — LISINOPRIL 5 MG: 5 TABLET ORAL at 09:53

## 2023-03-13 RX ADMIN — AZITHROMYCIN MONOHYDRATE 500 MG: 500 INJECTION, POWDER, LYOPHILIZED, FOR SOLUTION INTRAVENOUS at 15:15

## 2023-03-13 RX ADMIN — GUAIFENESIN 600 MG: 600 TABLET, EXTENDED RELEASE ORAL at 09:53

## 2023-03-13 RX ADMIN — OLANZAPINE 5 MG: 10 INJECTION, POWDER, LYOPHILIZED, FOR SOLUTION INTRAMUSCULAR at 02:01

## 2023-03-13 RX ADMIN — OXYCODONE HYDROCHLORIDE AND ACETAMINOPHEN 500 MG: 500 TABLET ORAL at 21:13

## 2023-03-13 RX ADMIN — GUAIFENESIN 600 MG: 600 TABLET, EXTENDED RELEASE ORAL at 21:13

## 2023-03-13 RX ADMIN — ZINC SULFATE 220 MG (50 MG) CAPSULE 1 CAPSULE: CAPSULE at 09:53

## 2023-03-13 RX ADMIN — METHYLPREDNISOLONE SODIUM SUCCINATE 40 MG: 40 INJECTION, POWDER, FOR SOLUTION INTRAMUSCULAR; INTRAVENOUS at 06:01

## 2023-03-13 RX ADMIN — SODIUM CHLORIDE, PRESERVATIVE FREE 10 ML: 5 INJECTION INTRAVENOUS at 21:15

## 2023-03-13 RX ADMIN — METHYLPREDNISOLONE SODIUM SUCCINATE 40 MG: 40 INJECTION, POWDER, FOR SOLUTION INTRAMUSCULAR; INTRAVENOUS at 00:11

## 2023-03-13 RX ADMIN — SODIUM CHLORIDE, PRESERVATIVE FREE 10 ML: 5 INJECTION INTRAVENOUS at 06:02

## 2023-03-13 RX ADMIN — SODIUM CHLORIDE, PRESERVATIVE FREE 10 ML: 5 INJECTION INTRAVENOUS at 15:16

## 2023-03-13 NOTE — PROGRESS NOTES
OCCUPATIONAL THERAPY EVALUATION/DISCHARGE  Patient: Paulina Holm (77 y.o. female)  Date: 3/13/2023  Primary Diagnosis: PNA (pneumonia) [J18.9]  [facility] [C34.90]       Precautions: falls       ASSESSMENT  Pt is a 81 y/o female with PMH of COPD, lung cancer presenting to [facility] with c/o SOB, admitted 3/11/23  and being treated for COVID. Pt received semi-supine in bed upon arrival, AXO x3, and agreeable to OT evaluation at this time. Per pt report, pt lives with alone in a one-story home with 0 JOSEFA and B HR, was IND with ADLs and Mod I using no AD for mobility at [facility]. Other DME owned includes: shower chair     Based on current observations, pt presents at functional baseline for ADLs/mobility demonstrating good UE strength/AROM, bed mobility, static/dynamic sitting balance, static/dynamic standing balance, functional activity tolerance, cognition/confusion impacting overall performance of ADLs and functional transfers/mobility. Pt in chair upon arrival. Ind with Sit:stand, ambulated to bathroom with no AD. Completed all aspects of toileting and grooming standing at sink with no AD and Mod I. Daughter in room during evaluation, discussed at length that pt is checked on everyday from son and daughter that live nearby. She is safe on her own with basic ADLS. Pt in chair end of session with all needs within reach. Pt currently requires distant SUP. Overall, pt tolerates session well. Pt is observed and reported to be at functional baseline w/ ADLs/mobility w/ no further OT skills indicated. Pt in agreement. Will therefore d/c patient from OT caseload at this time recommend d/c home w/ family/self care once medically appropriate. Other factors to consider for discharge: IND baseline      Patient will benefit from skilled therapy intervention to address the above noted impairments.        PLAN :  Recommendation for discharge: (in order for the patient to meet his/her long term goals)  Home Self Care    This discharge recommendation:  Has been made in collaboration with the attending provider and/or case management    IF patient discharges home will need the following DME: patient owns DME required for discharge       SUBJECTIVE:   Patient stated I just want to go home.     OBJECTIVE DATA SUMMARY:   HISTORY:   Past Medical History:   Diagnosis Date    Chronic obstructive pulmonary disease ([facility] [address] [facility]     Hypertension     [facility]    History reviewed. No pertinent surgical history. Expanded or extensive additional review of patient history:     Home Situation  Home Environment: Private residence  One/Two Story Residence: One story  Living Alone: Yes  Support Systems: Child(carol)  Patient Expects to be Discharged to[de-identified] Home with home health  Current DME Used/Available at Home: None    Hand dominance: Right    EXAMINATION OF PERFORMANCE DEFICITS:  Cognitive/Behavioral Status:  Neurologic State: Alert  Orientation Level: Oriented to person;Disoriented to place; Disoriented to situation;Disoriented to time  Cognition: Decreased attention/concentration               Hearing: Auditory  Auditory Impairment: None    Vision/Perceptual:                                     Range of Motion:    AROM: Within functional limits                         Strength:    Strength: Within functional limits                Coordination: Tone & Sensation:    Tone: Normal                         Balance:  Sitting: Intact; Without support  Standing: Intact; Without support    Functional Mobility and Transfers for ADLs:  Bed Mobility:  Rolling: Independent  Supine to Sit: Independent  Sit to Supine: Independent  Scooting: Independent    Transfers:  Sit to Stand: Modified independent  Stand to Sit: Modified independent  Toilet Transfer : Modified independent    ADL Assessment:                                            ADL Intervention and task modifications:       Grooming  Position Performed: Standing  Washing Hands: Independent                                  Functional Measure:    [facility] AM-PACTM \"6 Clicks\"                                                       Daily Activity Inpatient Short Form  How much help from another person does the patient currently need. .. Total; A Lot A Little None   1. Putting on and taking off regular lower body clothing? []  1 []  2 []  3 [x]  4   2. Bathing (including washing, rinsing, drying)? []  1 []  2 [x]  3 []  4   3. Toileting, which includes using toilet, bedpan or urinal? [] 1 []  2 []  3 [x]  4   4. Putting on and taking off regular upper body clothing? []  1 []  2 []  3 [x]  4   5. Taking care of personal grooming such as brushing teeth? []  1 []  2 []  3 [x]  4   6. Eating meals? []  1 []  2 []  3 [x]  4   © 2007, Trustees of [facility], under license to [employer]. All rights reserved     Score: 23/24     Interpretation of Tool:  Represents clinically-significant functional categories (i.e. Activities of daily living). Percentage of Impairment CH    0%   CI    1-19% CJ    20-39% CK    40-59% CL    60-79% CM    80-99% CN     100%   AMPA  Score 6-24 24 23 20-22 15-19 10-14 7-9 6         Occupational Therapy Evaluation Charge Determination   History Examination Decision-Making   LOW Complexity : Brief history review  LOW Complexity : 1-3 performance deficits relating to physical, cognitive , or psychosocial skils that result in activity limitations and / or participation restrictions  MEDIUM Complexity : Patient may present with comorbidities that affect occupational performnce. Miniml to moderate modification of tasks or assistance (eg, physical or verbal ) with assesment(s) is necessary to enable patient to complete evaluation       Based on the above components, the patient evaluation is determined to be of the following complexity level: LOW     Pain Ratin/10    Activity Tolerance:    WNL    After treatment patient left in no apparent distress: Sitting in chair, Call bell within reach, and Caregiver / family present    COMMUNICATION/EDUCATION:   The patients plan of care was discussed with: Physical therapist and Registered nurse. OT/PT sessions occurred together for increased patient and clinician safety as pt with decreased activity tolerance and requires A of 2 for mobility at this time.      Thank you for this referral.  Eve Bush, OT  Time Calculation: 18 mins

## 2023-03-13 NOTE — PROGRESS NOTES
Pulmonary Progress Note      NAME: Caroline Farias   :  1940  MRM:  [id number]    Date/Time: 3/13/2023  6:23 PM         Subjective:     Patient seen and examined  Overnight events noted    Sitting up in the chair comfortably  On room air  No acute distress  Family at bedside      Past Medical History reviewed and unchanged from Admission History and Physical       Objective:     Physical Exam     Vitals:      Last 24hrs VS reviewed since prior progress note. Most recent are:    Visit Vitals  BP (!) 151/75 (BP 1 Location: Right upper arm, BP Patient Position: Sitting)   Pulse 93   Temp 97.4 °F (36.3 °C)   Resp 18   Ht 5' 3\" (1.6 m)   Wt 49 kg (108 lb)   SpO2 99%   BMI 19.13 kg/m²     SpO2 Readings from Last 6 Encounters:   23 99%   22 98%   22 95%        No intake or output data in the 24 hours ending 23 1128     Physical Exam:  Patient is a elderly white female who is on room air. Not in any distress. Vital signs as above. HEENT examination show pupils are equal and reactive to light. No significant pallor. No icterus. Nasal passages are patent. Oropharynx is without thrush. Neck is supple and trachea central.  No JVD or lymphadenopathy. She is not using accessory muscles of respiration. Chest is symmetrical with equal and fair air entry bilaterally. She is diminished breath sounds over the left lung base with some crackles. Occasional scattered wheezes,  Improved. No chest wall tenderness. Rhythm is regular without any loud murmur or gallop. Sinus tachycardia intermittently is noted on the monitor. Abdomen is obese and benign. Bowel sounds audible. No masses or organomegaly. Extremities do not show any cyanosis or clubbing. No significant pitting edema. Pulses are palpable. Neurologic system examination is grossly intact. Skin is warm and dry. CTA CHEST W OR W WO CONT   Final Result   1. Small left pleural effusion.  Debris or carcinomatosis in the left pleural   space. 2. Tight left lower lobe collapse. Narrowing of the left pulmonary arteries. XR CHEST PORT   Final Result   Interval increase in size of small left pleural effusion and left   basilar consolidation.               Lab Data Reviewed: (see below)      Medications:  Current Facility-Administered Medications   Medication Dose Route Frequency    apixaban (ELIQUIS) tablet 2.5 mg  2.5 mg Oral BID    lisinopriL (PRINIVIL, ZESTRIL) tablet 5 mg  5 mg Oral DAILY    metoprolol tartrate (LOPRESSOR) tablet 25 mg  25 mg Oral BID    benzonatate (TESSALON) capsule 100 mg  100 mg Oral TID PRN    methylPREDNISolone (PF) (SOLU-MEDROL) injection 40 mg  40 mg IntraVENous Q6H    ascorbic acid (vitamin C) (VITAMIN C) tablet 500 mg  500 mg Oral BID    sodium chloride (NS) flush 5-40 mL  5-40 mL IntraVENous Q8H    sodium chloride (NS) flush 5-40 mL  5-40 mL IntraVENous PRN    acetaminophen (TYLENOL) tablet 650 mg  650 mg Oral Q6H PRN    Or    acetaminophen (TYLENOL) suppository 650 mg  650 mg Rectal Q6H PRN    polyethylene glycol (MIRALAX) packet 17 g  17 g Oral DAILY PRN    ondansetron (ZOFRAN ODT) tablet 4 mg  4 mg Oral Q8H PRN    Or    ondansetron (ZOFRAN) injection 4 mg  4 mg IntraVENous Q6H PRN    azithromycin (ZITHROMAX) 500 mg in 0.9% sodium chloride 250 mL ([id number])  500 mg IntraVENous Q24H    zinc sulfate (ZINCATE) 50 mg zinc (220 mg) capsule 1 Capsule  1 Capsule Oral DAILY    guaiFENesin ER (MUCINEX) tablet 600 mg  600 mg Oral Q12H    albuterol (PROVENTIL HFA, VENTOLIN HFA, PROAIR HFA) inhaler 2 Puff  2 Puff Inhalation Q4H PRN       ______________________________________________________________________      Lab Review:     Recent Labs     03/12/23  0942 03/11/23  1024   WBC 9.4 12.2*   HGB 12.6 11.1*   HCT 37.2 32.8*    232       Recent Labs     03/12/23  0942 03/11/23  1024   * 126*   K 3.3* 4.3   CL 99 96*   CO2 26 24   * 94   BUN 23* 16   CREA 1.18* 0.81   CA 8.8 9.0   ALB  --  3.1* ALT  --  29       No components found for: GLPOC  No results for input(s): PH, PCO2, PO2, HCO3, FIO2 in the last 72 hours. No results for input(s): INR, INREXT, INREXT, INREXT in the last 72 hours. Other pertinent lab:   Laboratory data on admit:  Portable chest x-ray done 3/11 shows slight increase in the left pleural effusion as compared to a prior study of January 2022. CTA of the chest was obtained 3/11/23. No pulmonary embolism is seen, personally reviewed. However the report does not mention specifically the absence of pulmonary embolism. There is tight collapse of the left lower lobe described. According to the report the left lower lobe pulmonary artery is narrowed. There is a small left pleural effusion with possible debris in it. She has chronic left-sided rib fractures. There are no comparison. There is mild mosaic attenuation of the right lung. Electrolytes are within normal limits. BUN is 16 creatinine is 1.81. Blood glucose of 94. WBC count 12.2 with neutrophils of 82% hemoglobin is 11.1 and a platelet count of 076. Lactic acid 1.0 troponin 12. BNP is 1/2/2005. Rapid COVID test is positive. Assessment & Plan:      1. The patient has COVID-19. CT of the chest does not show any focal pneumonia. There is some debris noted in the lingular bronchus. There are changes of chronic left pleural effusion with left lower lobe atelectasis/collapse which is related to her history of lung cancer. She recently completed a course of Augmentin. Agree with starting her on azithromycin for atypical coverage and IV Solu-Medrol. I added guaifenesin. Continue with albuterol inhaler on a as needed basis. She is started on ascorbic acid and zinc.  She is currently on room air. She does not need any specific treatment for COVID-19. She has been fully vaccinated. 2.  History of lung cancer. Status posttreatment but it appears to be recurrent.   She had a PET scan done on 2/28/2023 at [facility] which showed partial response. Input from oncology appreciated. She is on second line treatment with weekly Taxotere and has been tolerating it well. 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 3.  History of DVT. She is on Eliquis 2.5 mg twice daily. 4.  History of cardiomyopathy with a EF of 25% according to the daughter. Echocardiogram would be helpful. Questions of patient and family were answered at bedside in detail  Case discussed in detail with RN, RT, and care team  Thank you for involving me in the care of this patient  I will follow with you closely during hospitalization    Time spent more than 30 minutes under patient care with no overlap reviewing results and records, decision making, and answering questions.     Martin Norton MD  Pulmonary/CC

## 2023-03-13 NOTE — PROGRESS NOTES
PHYSICAL THERAPY EVALUATION/DISCHARGE  Patient: Izabel Lucero (24 y.o. female)  Date: 3/13/2023  Primary Diagnosis: PNA (pneumonia) [J18.9]  Lung cancer ([facility] [C34.90]       Precautions: Fall  ,COVID(+)     ASSESSMENT  Pt is a 80 y.o. female admitted on 3/11/2023 for not getting better; pt currently being treated for COVID(+) . Pt standing at bedside upon PT arrival, agreeable to evaluation. Please refer to [address] from attending notes. Pt A&O x 1. Based on the objective data described below, the patient presents at functional baseline for mobility and amb. (See below for objective details and assist levels). Overall pt tolerated session good today with PT. Pt has no skilled acute PT needs at this time noted by PT or reported by pt, will DC skilled PT following evaluation; pt verbalized understanding and agreement. Current Level of Function Impacting Discharge (mobility/balance): patient at baseline mobility level    Other factors to consider for discharge: home with family assist     PLAN :  Recommendations and Planned Interventions: DC from skilled PT services following evaluation    Recommend for staff: Amb to bathroom for toileting with gt belt and AD    Frequency/Duration: Patient will be followed by physical therapy: DC from services following evaluation due to pt being at functional baseline; no skilled therapy required during admission, please reorder if needed     Recommendation for discharge: (in order for the patient to meet his/her long term goals)  Home with [address] [place] [address]    This discharge recommendation:  Has been made in collaboration with the attending provider and/or case management    IF patient discharges home will need the following DME: none       SUBJECTIVE:   Patient stated I am ok.     OBJECTIVE DATA SUMMARY:   HISTORY:    Past Medical History:   Diagnosis Date    Chronic obstructive pulmonary disease ([facility]     Hypertension     Lung cancer ([facility]    History reviewed.  No pertinent surgical history. Home Situation  Home Environment: Private residence  One/Two Story Residence: One story  Living Alone: Yes  Support Systems: Child(carol)  Patient Expects to be Discharged to[de-identified] Home with home health  Current DME Used/Available at Home: None    PLOF: Per chart review patient lives alone,family goes to check on her:  indep ADLs/IADLs, indep for mobility, unknown falls in the last 3 months. EXAMINATION/PRESENTATION/DECISION MAKING:   Critical Behavior:  Neurologic State: Alert  Orientation Level: Oriented to person, Disoriented to place, Disoriented to situation, Disoriented to time  Cognition: Decreased attention/concentration     Hearing: Auditory  Auditory Impairment: None  Skin:  intact  Edema:   Range Of Motion:  AROM: Within functional limits                       Strength:    Strength: Within functional limits                    Tone & Sensation:   Tone: Normal                              Functional Mobility:  Bed Mobility:  Rolling: Independent  Supine to Sit: Independent  Sit to Supine: Independent  Scooting: Independent  Transfers:  Sit to Stand: Modified independent  Stand to Sit: Modified independent                       Balance:   Sitting: Intact; Without support  Standing: Intact; Without support  Ambulation/Gait Training:  Distance (ft): 50 Feet (ft)  Assistive Device: Other (comment) (HHA)  Ambulation - Level of Assistance: Modified independent     Gait Description (WDL): Exceptions to [facility]                                     Functional Measure:  [facility] Mobility Inpatient Short Form  How much difficulty does the patient currently have. .. Unable A Lot A Little None   1. Turning over in bed (including adjusting bedclothes, sheets and blankets)? [] 1   [] 2   [] 3   [x] 4   2. Sitting down on and standing up from a chair with arms ( e.g., wheelchair, bedside commode, etc.)   [] 1   [] 2   [] 3   [x] 4   3.   Moving from lying on back to sitting on the side of the bed? [] 1   [] 2   [] 3   [x] 4          How much help from another person does the patient currently need. .. Total A Lot A Little None   4. Moving to and from a bed to a chair (including a wheelchair)? [] 1   [] 2   [] 3   [x] 4   5. Need to walk in hospital room? [] 1   [] 2   [] 3   [x] 4   6. Climbing 3-5 steps with a railing? [] 1   [] 2   [] 3   [x] 4   © , Trustees of [employer], under license to [employer]. All rights reserved     Score:  Initial:  Most Recent: X (Date: 2023)   Interpretation of Tool:  Represents activities that are increasingly more difficult (i.e. Bed mobility, Transfers, Gait). Score 24 23 22-20 19-15 14-10 9-7 6   Modifier CH CI CJ CK CL CM CN          Physical Therapy Evaluation Charge Determination   History Examination Presentation Decision-Making   LOW Complexity : Zero comorbidities / personal factors that will impact the outcome / POC LOW Complexity : 1-2 Standardized tests and measures addressing body structure, function, activity limitation and / or participation in recreation  LOW Complexity : Stable, uncomplicated  Other outcome measures [facility] 6        Based on the above components, the patient evaluation is determined to be of the following complexity level: LOW     Pain Ratin/10    Activity Tolerance:   Good    After treatment patient left in no apparent distress:   Bed locked and in lowest position, Sitting in chair       COMMUNICATION/EDUCATION:   The patients plan of care was discussed with: Registered nurse. Fall prevention education was provided and the patient/caregiver indicated understanding. and Patient understands intent and goals of therapy, but is neutral about his/her participation.       Thank you for this referral.  Jack Crisostomo, PT   Time Calculation: 20 mins

## 2023-03-13 NOTE — PROGRESS NOTES
Problem: Airway Clearance - Ineffective  Goal: Achieve or maintain patent airway  Outcome: Progressing Towards Goal     Problem: Gas Exchange - Impaired  Goal: Absence of hypoxia  Outcome: Progressing Towards Goal  Goal: Promote optimal lung function  Outcome: Progressing Towards Goal     Problem: Breathing Pattern - Ineffective  Goal: Ability to achieve and maintain a regular respiratory rate  Outcome: Progressing Towards Goal     Problem: Body Temperature -  Risk of, Imbalanced  Goal: Ability to maintain a body temperature within defined limits  Outcome: Progressing Towards Goal  Goal: Will regain or maintain usual level of consciousness  Outcome: Progressing Towards Goal  Goal: Complications related to the disease process, condition or treatment will be avoided or minimized  Outcome: Progressing Towards Goal     Problem: Isolation Precautions - Risk of Spread of Infection  Goal: Prevent transmission of infectious organism to others  Outcome: Progressing Towards Goal     Problem: Nutrition Deficits  Goal: Optimize nutrtional status  Outcome: Progressing Towards Goal     Problem: Risk for Fluid Volume Deficit  Goal: Maintain normal heart rhythm  Outcome: Progressing Towards Goal  Goal: Maintain absence of muscle cramping  Outcome: Progressing Towards Goal  Goal: Maintain normal serum potassium, sodium, calcium, phosphorus, and pH  Outcome: Progressing Towards Goal     Problem: Loneliness or Risk for Loneliness  Goal: Demonstrate positive use of time alone when socialization is not possible  Outcome: Progressing Towards Goal     Problem: Fatigue  Goal: Verbalize increase energy and improved vitality  Outcome: Progressing Towards Goal     Problem: Patient Education: Go to Patient Education Activity  Goal: Patient/Family Education  Outcome: Progressing Towards Goal     Problem: Falls - Risk of  Goal: *Absence of Falls  Description: Document Tori Fall Risk and appropriate interventions in the flowsheet.   Outcome: Progressing Towards Goal  Note: Fall Risk Interventions:                                Problem: Patient Education: Go to Patient Education Activity  Goal: Patient/Family Education  Outcome: Progressing Towards Goal

## 2023-03-13 NOTE — PROGRESS NOTES
Hematology/Oncology Consult    Patient: Noah Shafer MRN: [id number]     YOB: 1940  Age: 80 y.o. Sex: female      HPI      Chief Complaint   Patient presents with    Shortness of Breath    Positive For Covid-19       Realison Cousin is a 80 y.o. female who is being seen for stage IV non-small cell lung cancer. No new complaints. Past Medical History:   Diagnosis Date    Chronic obstructive pulmonary disease ([facility] [address]     Hypertension     Lung cancer ([facility] [address]      History reviewed. No pertinent surgical history. No family history on file.   Social History     Tobacco Use    Smoking status: Never    Smokeless tobacco: Never   Substance Use Topics    Alcohol use: Not Currently      Current Facility-Administered Medications   Medication Dose Route Frequency Provider Last Rate Last Admin    methylPREDNISolone (PF) (SOLU-MEDROL) injection 40 mg  40 mg IntraVENous Q12H Espiridion Ryland, NP        apixaban Parveen Solders) tablet 2.5 mg  2.5 mg Oral BID Anamaria Otero PA-C   2.5 mg at 03/13/23 0953    lisinopriL (PRINIVIL, ZESTRIL) tablet 5 mg  5 mg Oral DAILY Anamaria Otero PA-C   5 mg at 03/13/23 0953    metoprolol tartrate (LOPRESSOR) tablet 25 mg  25 mg Oral BID Spike Otero PA-C   25 mg at 03/13/23 6581    benzonatate (TESSALON) capsule 100 mg  100 mg Oral TID PRN Elma Strickland PA-C        ascorbic acid (vitamin C) (VITAMIN C) tablet 500 mg  500 mg Oral BID Anamaria Otero PA-C   500 mg at 03/13/23 0953    sodium chloride (NS) flush 5-40 mL  5-40 mL IntraVENous Q8H Anamaria Otero PA-C   10 mL at 03/13/23 1516    sodium chloride (NS) flush 5-40 mL  5-40 mL IntraVENous PRN Anamaria Otero PA-C        acetaminophen (TYLENOL) tablet 650 mg  650 mg Oral Q6H PRN Anamaria Otero PA-C        Or    acetaminophen (TYLENOL) suppository 650 mg  650 mg Rectal Q6H PRN Anamaria Otero PA-C        polyethylene glycol (MIRALAX) packet 17 g  17 g Oral DAILY PRN Elma Strickland PA-C ondansetron (ZOFRAN ODT) tablet 4 mg  4 mg Oral Q8H PRN Anamaria Otero PA-C        Or    ondansetron (ZOFRAN) injection 4 mg  4 mg IntraVENous Q6H PRN Anamaria Otero PA-C        azithromycin (ZITHROMAX) 500 mg in 0.9% sodium chloride 250 mL ([id number])  500 mg IntraVENous Q24H Gualberto Flores  mL/hr at 03/13/23 1515 500 mg at 03/13/23 1515    zinc sulfate (ZINCATE) 50 mg zinc (220 mg) capsule 1 Capsule  1 Capsule Oral DAILY Radha Silva MD   1 Capsule at 03/13/23 0953    guaiFENesin ER (MUCINEX) tablet 600 mg  600 mg Oral Q12H Lebron Redd MD   600 mg at 03/13/23 0953    albuterol (PROVENTIL HFA, VENTOLIN HFA, PROAIR HFA) inhaler 2 Puff  2 Puff Inhalation Q4H PRN Lebron Redd MD            No Known Allergies    Review of Systems:  Constitutional No fevers, chills, night sweats, positive for excessive fatigue or weight loss. Allergic/Immunologic No recent allergic reactions   Eyes No significant visual difficulties. No diplopia. ENMT No problems with hearing, no sore throat, no sinus drainage. Endocrine No hot flashes or night sweats. No cold intolerance, polyuria, or polydipsia   Hematologic/Lymphatic No easy bruising or bleeding. The patient denies any tender or palpable lymph nodes   Breasts Positive for abnormal masses of breast, nipple discharge or pain. Respiratory No dyspnea on exertion, orthopnea, chest pain, cough or hemoptysis. Cardiovascular No anginal chest pain, irregular heart beat, tachycardia, palpitations or orthopnea. Gastrointestinal No nausea, vomiting, diarrhea, constipation, cramping, dysphagia, reflux, heartburn, GI bleeding, or early satiety. No change in bowel habits. Genitourinary (M) No hematuria, dysuria, increased frequency, urgency, hesitancy or incontinence. Musculoskeletal No joint pain, swelling or redness. No decreased range of motion. Integumentary No chronic rashes, inflammation, ulcerations, pruritus, petechiae, purpura, ecchymoses, or skin changes. Neurologic No headache, blurred vision, and no areas of focal weakness or numbness. Normal gait. No sensory problems. Psychiatric No insomnia, depression, cruz or mood swings. No psychotropic drugs. Objective:     Vitals:    03/12/23 1111 03/12/23 2010 03/13/23 0951 03/13/23 1611   BP: 126/66 128/72 (!) 151/75 112/67   Pulse: 94 87 93 83   Resp: 20 19 18 19   Temp: 98.3 °F (36.8 °C) 98.3 °F (36.8 °C) 97.4 °F (36.3 °C) 98 °F (36.7 °C)   SpO2: 96% 95% 99% 99%   Weight:       Height:            Physical Exam:  Constitutional Alert, cooperative, oriented. Mood and affect appropriate. Appears close to chronological age. Well nourished. Well developed. Head Normocephalic; no scars   Eyes Conjunctivae and sclerae are clear and without icterus. Pupils are reactive and equal.   ENMT Sinuses are nontender. No oral exudates, ulcers, masses, thrush or mucositis. Oropharynx clear. Tongue normal.   Neck Supple without masses or thyromegaly. No jugular venous distension. Hematologic/Lymphatic No petechiae or purpura. No tender or palpable lymph nodes in the cervical, supraclavicular, axillary or inguinal area. Respiratory Lungs are clear to auscultation without rhonchi or wheezing. Cardiovascular Regular rate and rhythm of heart without murmurs, gallops or rubs. Chest / Line Site Chest is symmetric with no chest wall deformities. Abdomen Non-tender, non-distended, no masses, ascites or hepatosplenomegaly. Good bowel sounds. No guarding or rebound tenderness. No pulsatile masses. Musculoskeletal No tenderness or swelling, normal range of motion without obvious weakness. Extremities No visible deformities, no cyanosis, clubbing or edema. Skin No rashes, scars, or lesions suggestive of malignancy. No petechiae, purpura, or ecchymoses. No excoriations. Neurologic No sensory or motor deficits, normal cerebellar function, normal gait, cranial nerves intact.    Psychiatric Alert and oriented times three. Coherent speech. Verbalizes understanding of our discussions today. Lab/Data Review:  Recent Labs     03/12/23  0942 03/11/23  1024   WBC 9.4 12.2*   HGB 12.6 11.1*   HCT 37.2 32.8*    232     Recent Labs     03/12/23  0942 03/11/23  1024   * 126*   K 3.3* 4.3   CL 99 96*   CO2 26 24   * 94   BUN 23* 16   CREA 1.18* 0.81   CA 8.8 9.0   ALB  --  3.1*   TBILI  --  0.5   ALT  --  29     No results for input(s): PH, PCO2, PO2, HCO3, FIO2 in the last 72 hours. No results found for this or any previous visit (from the past 24 hour(s)). CTA CHEST W OR W WO CONT    Result Date: 3/11/2023  1. Small left pleural effusion. Debris or carcinomatosis in the left pleural space. 2. Tight left lower lobe collapse. Narrowing of the left pulmonary arteries. XR CHEST PORT    Result Date: 3/11/2023  Interval increase in size of small left pleural effusion and left basilar consolidation. PET scan 2/28/23:,  since PET/CT 10/25/2022, a new opacity containing air bronchograms has  developed in the superior segment of the right lower lobe. It is  hypermetabolic (max SUV 6.5). The finding may represent pneumonia  though cannot exclude neoplasm. Short-term follow-up recommended. There is otherwise diminished glucose avidity of previously noted left  lung and left pleural lesions. In particular, an area of  hypermetabolic activity in the atelectatic/consolidated left lower  lobe now exhibits a max SUV of 5.7 compared with 11.9 on 10/25/2022. A  focus of glucose avidity in the posterior medial inferior left pleura  is now minimally glucose avid (max SUV 2.9 today compared with 5.1  previously). A small focus of glucose avidity in the posterior medial  superior left pleura has diminished in size and metabolic activity  (max SUV 2.4 today compared with 3.4 previously).   Hypermetabolic activity in the rectosigmoid described previously is  not now seen, it presumably having represented physiologic bowel  activity. There is otherwise an anticipated biodistribution of FDG. Calcific atherosclerotic coronary arterial disease is present. Impression:  1. Developing hypermetabolic opacity in the right lower lobe, possibly  pneumonia versus neoplasm. Short-term follow-up recommended. 2. Diminished glucose avidity of previously noted left lung and left  pleural lesions, as described. Assessment and Plan:     Hospital Problems  Never Reviewed            Codes Class Noted POA    Lung cancer ([facility] [address] ICD-10-CM: C34.90  ICD-9-CM: 162.9  3/11/2023 Unknown        PNA (pneumonia) ICD-10-CM: J18.9  ICD-9-CM: 486  3/11/2023 Unknown           1. NSCLC favor adenocarcinoma: stage IIB, presented as left lower lobe lung mass in 2018  s/p chemo xrt with weekly carbo/taxol and completed  Imfinzi maintenance treatments on 11/2020. She developed biopsy proven recurrence in the Left lower lobe with malignant pleural effusion in 8/2022. Pleural fluid cytology 8/23/22: positive for METASTATIC ADENOCARCINOMA. _She receivd first line Ynes Alimta and Keytruda for 3 cycles but developed progressive disease.  -She is switched to second line weekly Taxotere. She is tolerating this well now. -Recent PET scan shows partial response with diminished FDG activity in the left lung and left pleural effusions. 2.  Recent COVID-19 pneumonia: Tested positive last week stool testing positive.  -The acute shortness of breath and pulmonary symptoms are likely related to this. Further treatment as per primary treating.  -She is currently on room air and is not in any major distress. 3.  History of CHF diagnosed in December 2022 with EF of 20 to 25%. -Off diuretics is followed by Dr. Annamary Goldberg cardiology. 4.  Remote history of DVT on long-term Eliquis 2.5 twice daily:  continue this. We will sign off. Follow-up with Dr. Hardik Alfaro as scheduled.     Signed By: Toi Barthel, MD     March 13, 2023

## 2023-03-13 NOTE — PROGRESS NOTES
Patient confused, agitated and removing tele-box several times. She has also tried leaving room several times. Hospitalist on-call informed of situation. Telephone order received for Zyprexa 5 mg IM, once. 01:47 Patient continues to be agitated, removing tele-box and trying to leave room. Hospitalist on-call informed and telephone order received for Zyprexa 5 mg IM, once.

## 2023-03-13 NOTE — PROGRESS NOTES
Hospitalist Progress Note            Daily Progress Note: 3/13/2023 11:40 AM  Hospital course:   Luzmaria Lay is a 80 y.o. female with a history of COPD, lung cancer, hypothyroidism that presented to the emergency room on 3/11/2023 for 1 week history of worsening shortness of breath. Patient's pulmonologist is Dr. Lara Payne. Oncologist is Dr. Marcella Florentino. Patient tested positive for COVID on 3/4/2023. Katina Martinez She called her primary care and was given Augmentin p.o. however per daughter at bedside says that over the course the last week she has had worsening shortness of breath, wheezing, nonproductive cough. In the ED patient was found to be acutely hypoxic with an oxygen saturation of 88. She was placed on oxygen nasal cannula. Then weaned off quickly. Chest x-ray showed interval increase in size of small left pleural effusion and left basilar consolidation. CTA of the chest showed small left pleural effusion, debris or carcinomatosis in the left pleural space, trace left lower lobe collapse, narrowing of the left pulmonary arteries. Laboratory data was significant for a BNP of 1205. Troponin x1 negative. Sodium 126. WBC 12.2. Rapid COVID is pending. Was requested admission for hyponatremia, COVID-positive, increasing shortness of breath. Started on fluid restriction and IV Lasix 40 mg daily. Also started on IV Solu-Medrol and IV azithromycin. Tessalon as needed for cough. Pulmonology and oncology consulted. Portable chest x-ray done 3/11 shows slight increase in the left pleural effusion as compared to a prior study of January 2022. CTA of the chest was obtained 3/11/23. IMPRESSION  1. Small left pleural effusion. Debris or carcinomatosis in the left pleural  space. 2. Tight left lower lobe collapse. Narrowing of the left pulmonary arteries. Subjective:     Ms. Koenig Lesa seen resting comfortably on the medical floor. She is on room air. She is on IV solu-medrol and IV zithromax.  She is confused. Daughter at the bedside. Daughter states the patient lives alone. Assessment/Plan:   Active Problems:    Lung cancer ([address] (3/11/2023)      PNA (pneumonia) (3/11/2023)      Acute Hypoxic Respiratory failure 2/2 COVID (Resolved)  -currently on room air with oxygenation saturation level 99%.   -She does not use oxygen at home.   -Continue IV Azithromycin  -Continue IV solu-medrol  -continue guaifenesin for cough    History of Lung Cancer  -Oncology input appreciated    CHFrEF of 20-25%  -She is followed by Dr. Tc Damian cardiology  -not currently on diuretics  - BNP on 3/11/23 1,205  -status post IV Lasix x2    Hyponatremia  -sodium 131 increasing  -fluid Restriction    Hypertension  -Lisinopril 5 mg daily  -Metoprolol 25 mg twice daily  -Telemetry monitoring    History of DVT  -Continue Eliquis 2.5 mg twice daily    Social Determinants of Health: Confusion at times    DVT Prophylaxis: Eliquis  Code Status: Full Code  POA/NOK:    Disposition and discharge barriers:   IV Steroids  Pulmonary clearance  Care Plan discussed with: Patient, RN, case management    Current Facility-Administered Medications   Medication Dose Route Frequency    methylPREDNISolone (PF) (SOLU-MEDROL) injection 40 mg  40 mg IntraVENous Q12H    apixaban (ELIQUIS) tablet 2.5 mg  2.5 mg Oral BID    lisinopriL (PRINIVIL, ZESTRIL) tablet 5 mg  5 mg Oral DAILY    metoprolol tartrate (LOPRESSOR) tablet 25 mg  25 mg Oral BID    benzonatate (TESSALON) capsule 100 mg  100 mg Oral TID PRN    ascorbic acid (vitamin C) (VITAMIN C) tablet 500 mg  500 mg Oral BID    sodium chloride (NS) flush 5-40 mL  5-40 mL IntraVENous Q8H    sodium chloride (NS) flush 5-40 mL  5-40 mL IntraVENous PRN    acetaminophen (TYLENOL) tablet 650 mg  650 mg Oral Q6H PRN    Or    acetaminophen (TYLENOL) suppository 650 mg  650 mg Rectal Q6H PRN    polyethylene glycol (MIRALAX) packet 17 g  17 g Oral DAILY PRN    ondansetron (ZOFRAN ODT) tablet 4 mg  4 mg Oral Q8H PRN    Or ondansetron (ZOFRAN) injection 4 mg  4 mg IntraVENous Q6H PRN    azithromycin (ZITHROMAX) 500 mg in 0.9% sodium chloride 250 mL ([id number])  500 mg IntraVENous Q24H    zinc sulfate (ZINCATE) 50 mg zinc (220 mg) capsule 1 Capsule  1 Capsule Oral DAILY    guaiFENesin ER (MUCINEX) tablet 600 mg  600 mg Oral Q12H    albuterol (PROVENTIL HFA, VENTOLIN HFA, PROAIR HFA) inhaler 2 Puff  2 Puff Inhalation Q4H PRN        REVIEW OF SYSTEMS    Review of Systems   Constitutional:  Negative for chills and fever. HENT:  Positive for hearing loss. Eyes: Negative. Respiratory:  Positive for cough. Negative for sputum production, shortness of breath and wheezing. Cardiovascular:  Negative for chest pain and leg swelling. Gastrointestinal:  Negative for abdominal pain, heartburn, nausea and vomiting. Skin: Negative. Neurological:  Negative for dizziness, weakness and headaches. Objective:     Visit Vitals  BP (!) 151/75 (BP 1 Location: Right upper arm, BP Patient Position: Sitting)   Pulse 93   Temp 97.4 °F (36.3 °C)   Resp 18   Ht 5' 3\" (1.6 m)   Wt 49 kg (108 lb)   SpO2 99%   BMI 19.13 kg/m²      O2 Device: None (Room air)    Temp (24hrs), Av.9 °F (36.6 °C), Min:97.4 °F (36.3 °C), Max:98.3 °F (36.8 °C)        PHYSICAL EXAM:    Physical Exam  Constitutional:       Appearance: She is ill-appearing. HENT:      Head: Normocephalic and atraumatic. Cardiovascular:      Rate and Rhythm: Normal rate and regular rhythm. Heart sounds: No murmur heard. No gallop. Pulmonary:      Breath sounds: Wheezing present. Comments: Expiratory wheeze  Abdominal:      General: Bowel sounds are normal. There is no distension. Palpations: Abdomen is soft. Tenderness: There is no abdominal tenderness. Musculoskeletal:         General: No swelling or tenderness. Comments: Generalized weakness. Skin:     General: Skin is warm and dry. Coloration: Skin is pale.    Neurological:      Mental Status: She is alert. Comments: Confused at times   Psychiatric:         Behavior: Behavior normal.        Data Review    No results found for this or any previous visit (from the past 24 hour(s)). CTA CHEST W OR W WO CONT   Final Result   1. Small left pleural effusion. Debris or carcinomatosis in the left pleural   space. 2. Tight left lower lobe collapse. Narrowing of the left pulmonary arteries. XR CHEST PORT   Final Result   Interval increase in size of small left pleural effusion and left   basilar consolidation. Intake and Output:  Current Shift: No intake/output data recorded. Last three shifts: No intake/output data recorded. Lab/Data Review:  Recent Labs     03/12/23  0942 03/11/23  1024   WBC 9.4 12.2*   HGB 12.6 11.1*   HCT 37.2 32.8*    232     Recent Labs     03/12/23  0942 03/11/23  1024   * 126*   K 3.3* 4.3   CL 99 96*   CO2 26 24   * 94   BUN 23* 16   CREA 1.18* 0.81   CA 8.8 9.0   ALB  --  3.1*   TBILI  --  0.5   ALT  --  29     No results for input(s): PH, PCO2, PO2, HCO3, FIO2 in the last 72 hours. No results found for this or any previous visit (from the past 24 hour(s)). _____________________________________________________________________________  Time spent in direct care including coordination of service, review of data and examination: > 35 minutes    ______________________________________________________________________________    Elida Rodriguez NP    This is dictation was done by [employer], computer voice recognition software. Quite often unanticipated grammatical, syntax, homophones and other interpretive errors or inadvertently transcribed by the computer software. Please excuse errors that have escaped final proofreading. Thank you.

## 2023-03-13 NOTE — ROUTINE PROCESS
Bedside and Verbal shift change report given to Leroy Wilkerson (oncoming nurse) by Samantha Mancini (offgoing nurse). Report included the following information SBAR and MAR.

## 2023-03-14 LAB
ALBUMIN SERPL-MCNC: 3.5 G/DL (ref 3.5–5)
ALBUMIN/GLOB SERPL: 0.9 (ref 1.1–2.2)
ALP SERPL-CCNC: 59 U/L (ref 45–117)
ALT SERPL-CCNC: 39 U/L (ref 12–78)
ANION GAP SERPL CALC-SCNC: 5 MMOL/L (ref 5–15)
AST SERPL W P-5'-P-CCNC: 29 U/L (ref 15–37)
BASOPHILS # BLD: 0 K/UL (ref 0–0.1)
BASOPHILS NFR BLD: 0 % (ref 0–1)
BILIRUB SERPL-MCNC: 0.6 MG/DL (ref 0.2–1)
BUN SERPL-MCNC: 36 MG/DL (ref 6–20)
BUN/CREAT SERPL: 35 (ref 12–20)
CA-I BLD-MCNC: 8.6 MG/DL (ref 8.5–10.1)
CHLORIDE SERPL-SCNC: 99 MMOL/L (ref 97–108)
CO2 SERPL-SCNC: 24 MMOL/L (ref 21–32)
CREAT SERPL-MCNC: 1.03 MG/DL (ref 0.55–1.02)
DIFFERENTIAL METHOD BLD: ABNORMAL
EOSINOPHIL # BLD: 0 K/UL (ref 0–0.4)
EOSINOPHIL NFR BLD: 0 % (ref 0–7)
ERYTHROCYTE [DISTWIDTH] IN BLOOD BY AUTOMATED COUNT: 14.5 % (ref 11.5–14.5)
GLOBULIN SER CALC-MCNC: 4 G/DL (ref 2–4)
GLUCOSE SERPL-MCNC: 129 MG/DL (ref 65–100)
HCT VFR BLD AUTO: 36.7 % (ref 35–47)
HGB BLD-MCNC: 12.4 G/DL (ref 11.5–16)
IMM GRANULOCYTES # BLD AUTO: 0.1 K/UL (ref 0–0.04)
IMM GRANULOCYTES NFR BLD AUTO: 1 % (ref 0–0.5)
LYMPHOCYTES # BLD: 0.7 K/UL (ref 0.8–3.5)
LYMPHOCYTES NFR BLD: 6 % (ref 12–49)
MCH RBC QN AUTO: 29.5 PG (ref 26–34)
MCHC RBC AUTO-ENTMCNC: 33.8 G/DL (ref 30–36.5)
MCV RBC AUTO: 87.2 FL (ref 80–99)
MONOCYTES # BLD: 0.5 K/UL (ref 0–1)
MONOCYTES NFR BLD: 4 % (ref 5–13)
NEUTS SEG # BLD: 10.4 K/UL (ref 1.8–8)
NEUTS SEG NFR BLD: 89 % (ref 32–75)
NRBC # BLD: 0 K/UL (ref 0–0.01)
NRBC BLD-RTO: 0 PER 100 WBC
PLATELET # BLD AUTO: 319 K/UL (ref 150–400)
PMV BLD AUTO: 9.6 FL (ref 8.9–12.9)
POTASSIUM SERPL-SCNC: 4.4 MMOL/L (ref 3.5–5.1)
PROT SERPL-MCNC: 7.5 G/DL (ref 6.4–8.2)
RBC # BLD AUTO: 4.21 M/UL (ref 3.8–5.2)
SODIUM SERPL-SCNC: 128 MMOL/L (ref 136–145)
WBC # BLD AUTO: 11.7 K/UL (ref 3.6–11)

## 2023-03-14 PROCEDURE — 74011250637 HC RX REV CODE- 250/637: Performed by: INTERNAL MEDICINE

## 2023-03-14 PROCEDURE — 36415 COLL VENOUS BLD VENIPUNCTURE: CPT

## 2023-03-14 PROCEDURE — 74011250637 HC RX REV CODE- 250/637: Performed by: PHYSICIAN ASSISTANT

## 2023-03-14 PROCEDURE — 65270000029 HC RM PRIVATE

## 2023-03-14 PROCEDURE — 85025 COMPLETE CBC W/AUTO DIFF WBC: CPT

## 2023-03-14 PROCEDURE — 80053 COMPREHEN METABOLIC PANEL: CPT

## 2023-03-14 RX ORDER — DIPHENHYDRAMINE HCL 25 MG
25 CAPSULE ORAL
Status: DISCONTINUED | OUTPATIENT
Start: 2023-03-14 | End: 2023-03-16 | Stop reason: HOSPADM

## 2023-03-14 RX ORDER — SODIUM CHLORIDE 9 MG/ML
75 INJECTION, SOLUTION INTRAVENOUS CONTINUOUS
Status: DISCONTINUED | OUTPATIENT
Start: 2023-03-14 | End: 2023-03-14

## 2023-03-14 RX ADMIN — OXYCODONE HYDROCHLORIDE AND ACETAMINOPHEN 500 MG: 500 TABLET ORAL at 21:28

## 2023-03-14 RX ADMIN — ZINC SULFATE 220 MG (50 MG) CAPSULE 1 CAPSULE: CAPSULE at 09:50

## 2023-03-14 RX ADMIN — GUAIFENESIN 600 MG: 600 TABLET, EXTENDED RELEASE ORAL at 09:50

## 2023-03-14 RX ADMIN — Medication 1 PACKET: at 22:57

## 2023-03-14 RX ADMIN — METOPROLOL TARTRATE 25 MG: 25 TABLET, FILM COATED ORAL at 10:18

## 2023-03-14 RX ADMIN — GUAIFENESIN 600 MG: 600 TABLET, EXTENDED RELEASE ORAL at 21:07

## 2023-03-14 RX ADMIN — METOPROLOL TARTRATE 25 MG: 25 TABLET, FILM COATED ORAL at 21:08

## 2023-03-14 RX ADMIN — APIXABAN 2.5 MG: 2.5 TABLET, FILM COATED ORAL at 21:28

## 2023-03-14 RX ADMIN — APIXABAN 2.5 MG: 2.5 TABLET, FILM COATED ORAL at 09:50

## 2023-03-14 RX ADMIN — LISINOPRIL 5 MG: 5 TABLET ORAL at 09:50

## 2023-03-14 RX ADMIN — OXYCODONE HYDROCHLORIDE AND ACETAMINOPHEN 500 MG: 500 TABLET ORAL at 09:50

## 2023-03-14 NOTE — PROGRESS NOTES
Hospitalist Progress Note            Daily Progress Note: 3/14/2023 11:40 AM  Hospital course:   Luther Leventhal is a 80 y.o. female with a history of COPD, lung cancer, hypothyroidism that presented to the emergency room on 3/11/2023 for 1 week history of worsening shortness of breath. Patient's pulmonologist is Dr. Adonis Reed. Oncologist is Dr. Tejinder Brasher. Patient tested positive for COVID on 3/4/2023. Inocencio Bess She called her primary care and was given Augmentin p.o. however per daughter at bedside says that over the course the last week she has had worsening shortness of breath, wheezing, nonproductive cough. In the ED patient was found to be acutely hypoxic with an oxygen saturation of 88. She was placed on oxygen nasal cannula. Then weaned off quickly. Chest x-ray showed interval increase in size of small left pleural effusion and left basilar consolidation. CTA of the chest showed small left pleural effusion, debris or carcinomatosis in the left pleural space, trace left lower lobe collapse, narrowing of the left pulmonary arteries. Laboratory data was significant for a BNP of 1205. Troponin x1 negative. Sodium 126. WBC 12.2. Rapid COVID is pending. Was requested admission for hyponatremia, COVID-positive, increasing shortness of breath. Started on fluid restriction and IV Lasix 40 mg daily. Also started on IV Solu-Medrol and IV azithromycin. Tessalon as needed for cough. Pulmonology and oncology consulted. Portable chest x-ray done 3/11 shows slight increase in the left pleural effusion as compared to a prior study of January 2022. CTA of the chest was obtained 3/11/23. IMPRESSION  1. Small left pleural effusion. Debris or carcinomatosis in the left pleural  space. 2. Tight left lower lobe collapse. Narrowing of the left pulmonary arteries. 3/14: Sodium level 128 this morning. Nephrology consult pending      Subjective:     Ms. Shahid Rides seen resting comfortably on the medical floor. She is on room air. She is on IV solu-medrol and IV zithromax. Mentation waxes and wanes. She was discussing a book she is reading this morning. Assessment/Plan:   Active Problems:    Lung cancer ([address] (3/11/2023)      PNA (pneumonia) (3/11/2023)    Acute Hypoxic Respiratory failure 2/2 COVID (Resolved)  -currently on room air with oxygenation saturation level 99%.   -She does not use oxygen at home.   -Continue IV Azithromycin  -Continue IV solu-medrol  -continue guaifenesin for cough    History of Lung Cancer  -Oncology input appreciated  -Taxotere weekly per Oncology    CHFrEF of 20-25%  -She is followed by Dr. Shay Crisostomo cardiology  -not currently on diuretics  - BNP on 3/11/23 1,205  -status post IV Lasix x2    Hyponatremia  -sodium 128 decline from previous day of 131  -fluid Restriction  -Nephrology consult    Hypertension  -Lisinopril 5 mg daily  -Metoprolol 25 mg twice daily  -Telemetry monitoring    History of DVT  -Continue Eliquis 2.5 mg twice daily    Social Determinants of Health: Confusion at times    DVT Prophylaxis: Eliquis  Code Status: Full Code  POA/NOK:    Disposition and discharge barriers:   IV Steroids  Pulmonary clearance  Nephrology consult  Care Plan discussed with: Patient, RN, case management    Current Facility-Administered Medications   Medication Dose Route Frequency    methylPREDNISolone (PF) (SOLU-MEDROL) injection 40 mg  40 mg IntraVENous Q12H    apixaban (ELIQUIS) tablet 2.5 mg  2.5 mg Oral BID    lisinopriL (PRINIVIL, ZESTRIL) tablet 5 mg  5 mg Oral DAILY    metoprolol tartrate (LOPRESSOR) tablet 25 mg  25 mg Oral BID    benzonatate (TESSALON) capsule 100 mg  100 mg Oral TID PRN    ascorbic acid (vitamin C) (VITAMIN C) tablet 500 mg  500 mg Oral BID    sodium chloride (NS) flush 5-40 mL  5-40 mL IntraVENous Q8H    sodium chloride (NS) flush 5-40 mL  5-40 mL IntraVENous PRN    acetaminophen (TYLENOL) tablet 650 mg  650 mg Oral Q6H PRN    Or    acetaminophen (TYLENOL) suppository 650 mg 650 mg Rectal Q6H PRN    polyethylene glycol (MIRALAX) packet 17 g  17 g Oral DAILY PRN    ondansetron (ZOFRAN ODT) tablet 4 mg  4 mg Oral Q8H PRN    Or    ondansetron (ZOFRAN) injection 4 mg  4 mg IntraVENous Q6H PRN    azithromycin (ZITHROMAX) 500 mg in 0.9% sodium chloride 250 mL ([id number])  500 mg IntraVENous Q24H    zinc sulfate (ZINCATE) 50 mg zinc (220 mg) capsule 1 Capsule  1 Capsule Oral DAILY    guaiFENesin ER (MUCINEX) tablet 600 mg  600 mg Oral Q12H    albuterol (PROVENTIL HFA, VENTOLIN HFA, PROAIR HFA) inhaler 2 Puff  2 Puff Inhalation Q4H PRN        REVIEW OF SYSTEMS    Review of Systems   Constitutional:  Negative for chills and fever. HENT:  Positive for hearing loss. Eyes: Negative. Respiratory:  Positive for cough. Negative for sputum production, shortness of breath and wheezing. Cardiovascular:  Negative for chest pain and leg swelling. Gastrointestinal:  Negative for abdominal pain, heartburn, nausea and vomiting. Skin: Negative. Neurological:  Negative for dizziness, weakness and headaches. Objective:     Visit Vitals  /68 (BP 1 Location: Left upper arm, BP Patient Position: Sitting)   Pulse 98   Temp 97.2 °F (36.2 °C)   Resp 18   Ht 5' 3\" (1.6 m)   Wt 49 kg (108 lb)   SpO2 98%   BMI 19.13 kg/m²      O2 Device: None (Room air)    Temp (24hrs), Av.8 °F (36.6 °C), Min:97.2 °F (36.2 °C), Max:98.1 °F (36.7 °C)        PHYSICAL EXAM:    Physical Exam  Constitutional:       Appearance: She is ill-appearing. HENT:      Head: Normocephalic and atraumatic. Cardiovascular:      Rate and Rhythm: Normal rate and regular rhythm. Heart sounds: No murmur heard. No gallop. Pulmonary:      Breath sounds: Wheezing present. Comments: Expiratory wheeze  Abdominal:      General: Bowel sounds are normal. There is no distension. Palpations: Abdomen is soft. Tenderness: There is no abdominal tenderness.    Musculoskeletal:         General: No swelling or tenderness. Comments: Generalized weakness. Skin:     General: Skin is warm and dry. Coloration: Skin is pale. Neurological:      Mental Status: She is alert. Comments: Confused at times   Psychiatric:         Behavior: Behavior normal.        Data Review    Recent Results (from the past 24 hour(s))   CBC WITH AUTOMATED DIFF    Collection Time: 03/14/23  7:12 AM   Result Value Ref Range    WBC 11.7 (H) 3.6 - 11.0 K/uL    RBC 4.21 3.80 - 5.20 M/uL    HGB 12.4 11.5 - 16.0 g/dL    HCT 36.7 35.0 - 47.0 %    MCV 87.2 80.0 - 99.0 FL    MCH 29.5 26.0 - 34.0 PG    MCHC 33.8 30.0 - 36.5 g/dL    RDW 14.5 11.5 - 14.5 %    PLATELET 548 461 - 953 K/uL    MPV 9.6 8.9 - 12.9 FL    NRBC 0.0 0.0  WBC    ABSOLUTE NRBC 0.00 0.00 - 0.01 K/uL    NEUTROPHILS 89 (H) 32 - 75 %    LYMPHOCYTES 6 (L) 12 - 49 %    MONOCYTES 4 (L) 5 - 13 %    EOSINOPHILS 0 0 - 7 %    BASOPHILS 0 0 - 1 %    IMMATURE GRANULOCYTES 1 (H) 0 - 0.5 %    ABS. NEUTROPHILS 10.4 (H) 1.8 - 8.0 K/UL    ABS. LYMPHOCYTES 0.7 (L) 0.8 - 3.5 K/UL    ABS. MONOCYTES 0.5 0.0 - 1.0 K/UL    ABS. EOSINOPHILS 0.0 0.0 - 0.4 K/UL    ABS. BASOPHILS 0.0 0.0 - 0.1 K/UL    ABS. IMM. GRANS. 0.1 (H) 0.00 - 0.04 K/UL    DF AUTOMATED     METABOLIC PANEL, COMPREHENSIVE    Collection Time: 03/14/23  7:12 AM   Result Value Ref Range    Sodium 128 (L) 136 - 145 mmol/L    Potassium 4.4 3.5 - 5.1 mmol/L    Chloride 99 97 - 108 mmol/L    CO2 24 21 - 32 mmol/L    Anion gap 5 5 - 15 mmol/L    Glucose 129 (H) 65 - 100 mg/dL    BUN 36 (H) 6 - 20 mg/dL    Creatinine 1.03 (H) 0.55 - 1.02 mg/dL    BUN/Creatinine ratio 35 (H) 12 - 20      eGFR 54 (L) >60 ml/min/1.73m2    Calcium 8.6 8.5 - 10.1 mg/dL    Bilirubin, total 0.6 0.2 - 1.0 mg/dL    AST (SGOT) 29 15 - 37 U/L    ALT (SGPT) 39 12 - 78 U/L    Alk.  phosphatase 59 45 - 117 U/L    Protein, total 7.5 6.4 - 8.2 g/dL    Albumin 3.5 3.5 - 5.0 g/dL    Globulin 4.0 2.0 - 4.0 g/dL    A-G Ratio 0.9 (L) 1.1 - 2.2         CTA CHEST W OR W WO CONT   Final Result   1. Small left pleural effusion. Debris or carcinomatosis in the left pleural   space. 2. Tight left lower lobe collapse. Narrowing of the left pulmonary arteries. XR CHEST PORT   Final Result   Interval increase in size of small left pleural effusion and left   basilar consolidation. Intake and Output:  Current Shift: No intake/output data recorded. Last three shifts: No intake/output data recorded. Lab/Data Review:  Recent Labs     03/14/23  0712 03/12/23  0942   WBC 11.7* 9.4   HGB 12.4 12.6   HCT 36.7 37.2    310       Recent Labs     03/14/23  0712 03/12/23  0942   * 131*   K 4.4 3.3*   CL 99 99   CO2 24 26   * 178*   BUN 36* 23*   CREA 1.03* 1.18*   CA 8.6 8.8   ALB 3.5  --    TBILI 0.6  --    ALT 39  --        No results for input(s): PH, PCO2, PO2, HCO3, FIO2 in the last 72 hours. Recent Results (from the past 24 hour(s))   CBC WITH AUTOMATED DIFF    Collection Time: 03/14/23  7:12 AM   Result Value Ref Range    WBC 11.7 (H) 3.6 - 11.0 K/uL    RBC 4.21 3.80 - 5.20 M/uL    HGB 12.4 11.5 - 16.0 g/dL    HCT 36.7 35.0 - 47.0 %    MCV 87.2 80.0 - 99.0 FL    MCH 29.5 26.0 - 34.0 PG    MCHC 33.8 30.0 - 36.5 g/dL    RDW 14.5 11.5 - 14.5 %    PLATELET 208 142 - 153 K/uL    MPV 9.6 8.9 - 12.9 FL    NRBC 0.0 0.0  WBC    ABSOLUTE NRBC 0.00 0.00 - 0.01 K/uL    NEUTROPHILS 89 (H) 32 - 75 %    LYMPHOCYTES 6 (L) 12 - 49 %    MONOCYTES 4 (L) 5 - 13 %    EOSINOPHILS 0 0 - 7 %    BASOPHILS 0 0 - 1 %    IMMATURE GRANULOCYTES 1 (H) 0 - 0.5 %    ABS. NEUTROPHILS 10.4 (H) 1.8 - 8.0 K/UL    ABS. LYMPHOCYTES 0.7 (L) 0.8 - 3.5 K/UL    ABS. MONOCYTES 0.5 0.0 - 1.0 K/UL    ABS. EOSINOPHILS 0.0 0.0 - 0.4 K/UL    ABS. BASOPHILS 0.0 0.0 - 0.1 K/UL    ABS. IMM.  GRANS. 0.1 (H) 0.00 - 0.04 K/UL    DF AUTOMATED     METABOLIC PANEL, COMPREHENSIVE    Collection Time: 03/14/23  7:12 AM   Result Value Ref Range    Sodium 128 (L) 136 - 145 mmol/L    Potassium 4.4 3.5 - 5.1 mmol/L    Chloride 99 97 - 108 mmol/L    CO2 24 21 - 32 mmol/L    Anion gap 5 5 - 15 mmol/L    Glucose 129 (H) 65 - 100 mg/dL    BUN 36 (H) 6 - 20 mg/dL    Creatinine 1.03 (H) 0.55 - 1.02 mg/dL    BUN/Creatinine ratio 35 (H) 12 - 20      eGFR 54 (L) >60 ml/min/1.73m2    Calcium 8.6 8.5 - 10.1 mg/dL    Bilirubin, total 0.6 0.2 - 1.0 mg/dL    AST (SGOT) 29 15 - 37 U/L    ALT (SGPT) 39 12 - 78 U/L    Alk. phosphatase 59 45 - 117 U/L    Protein, total 7.5 6.4 - 8.2 g/dL    Albumin 3.5 3.5 - 5.0 g/dL    Globulin 4.0 2.0 - 4.0 g/dL    A-G Ratio 0.9 (L) 1.1 - 2.2             _____________________________________________________________________________  Time spent in direct care including coordination of service, review of data and examination: > 35 minutes    ______________________________________________________________________________    Toby Peon, NP    This is dictation was done by dragon, computer voice recognition software. Quite often unanticipated grammatical, syntax, homophones and other interpretive errors or inadvertently transcribed by the computer software. Please excuse errors that have escaped final proofreading. Thank you.

## 2023-03-14 NOTE — PROGRESS NOTES
CM discussed discharge planning with daughter Bill Zeenat [phone]. Patient will return home selfcare. Family will assist with care as needed. Daughter will transport at discharge.

## 2023-03-14 NOTE — PROGRESS NOTES
Patient without falls at this time. Patient requires constant education on isolation precautions as she is confused.

## 2023-03-14 NOTE — CONSULTS
Nephrology Consult    Patient: Frank Ramirez MRN: [id number]  SSN: [id number]    YOB: 1940  Age: 80 y.o. Sex: female      Subjective:   Reason for the consultation. Hyponatremia    History of present illness. The patient is 80-year-old woman with underlying history of COPD, adenocarcinoma of the lung diagnosed in August 2022 and currently on chemotherapy, tested positive for COVID on 3/4 and hospitalized for shortness of breath and diagnosed with acute hypoxic respiratory failure placed on nasal cannula O2. She underwent CT PA chest which showed small left pleural effusion and no infiltrates or congestion. She was put on Solu-Medrol along with IV azithromycin. On admission sodium was 126 did improve to 131 and then dropped again to 128 today which prompted nephrology consultation. She has not noticed to be on thiazide diuretics or SSRIs at home. No nausea vomiting or loose stools. No lower extremity swelling. She is denying having headaches or dizziness. Past Medical History:   Diagnosis Date    Chronic obstructive pulmonary disease ([facility]     Hypertension     Lung cancer ([facility]      History reviewed. No pertinent surgical history. No family history on file.   Social History     Tobacco Use    Smoking status: Never    Smokeless tobacco: Never   Substance Use Topics    Alcohol use: Not Currently      Current Facility-Administered Medications   Medication Dose Route Frequency Provider Last Rate Last Admin    methylPREDNISolone (PF) (SOLU-MEDROL) injection 40 mg  40 mg IntraVENous Q12H Facundo CASEY NP   40 mg at 03/13/23 2123    apixaban (ELIQUIS) tablet 2.5 mg  2.5 mg Oral BID Anamaria Otero PA-C   2.5 mg at 03/14/23 0950    lisinopriL (PRINIVIL, ZESTRIL) tablet 5 mg  5 mg Oral DAILY Anamaria Otero PA-C   5 mg at 03/14/23 0950    metoprolol tartrate (LOPRESSOR) tablet 25 mg  25 mg Oral BID Anamaria Otero PA-C   25 mg at 03/14/23 1018    benzonatate (TESSALON) capsule 100 mg  100 mg Oral TID PRN Fela Rich PA-C        ascorbic acid (vitamin C) (VITAMIN C) tablet 500 mg  500 mg Oral BID Anamaria Otero PA-C   500 mg at 03/14/23 0950    sodium chloride (NS) flush 5-40 mL  5-40 mL IntraVENous Q8H Anamaria Otero PA-C   10 mL at 03/13/23 2115    sodium chloride (NS) flush 5-40 mL  5-40 mL IntraVENous PRN Anamaria Otero PA-C        acetaminophen (TYLENOL) tablet 650 mg  650 mg Oral Q6H PRN Anamaria Otero PA-C        Or    acetaminophen (TYLENOL) suppository 650 mg  650 mg Rectal Q6H PRN Anamaria Otero PA-C        polyethylene glycol (MIRALAX) packet 17 g  17 g Oral DAILY PRN Anamaria Otero PA-C        ondansetron (ZOFRAN ODT) tablet 4 mg  4 mg Oral Q8H PRN Anamaria Otero PA-C        Or    ondansetron (ZOFRAN) injection 4 mg  4 mg IntraVENous Q6H PRN Anamaria Otero PA-C        azithromycin (ZITHROMAX) 500 mg in 0.9% sodium chloride 250 mL ([id number])  500 mg IntraVENous Q24H Gualberto Flores MD   Stopped at 03/14/23 0700    zinc sulfate (ZINCATE) 50 mg zinc (220 mg) capsule 1 Capsule  1 Capsule Oral DAILY Radha Silva MD   1 Capsule at 03/14/23 0950    guaiFENesin ER (MUCINEX) tablet 600 mg  600 mg Oral Q12H Perico Almeida MD   600 mg at 03/14/23 0950    albuterol (PROVENTIL HFA, VENTOLIN HFA, PROAIR HFA) inhaler 2 Puff  2 Puff Inhalation Q4H PRN Perico Almeida MD            No Known Allergies    Review of Systems:  A comprehensive review of systems was negative except for that written in the History of Present Illness.     Objective:     Vitals:    03/13/23 1611 03/13/23 2014 03/14/23 0447 03/14/23 0816   BP: 112/67 126/71 110/73 138/68   Pulse: 83 80 95 98   Resp: 19 18 18 18   Temp: 98 °F (36.7 °C) 98.1 °F (36.7 °C) 97.7 °F (36.5 °C) 97.2 °F (36.2 °C)   SpO2: 99% 99% 99% 98%   Weight:       Height:            Physical Exam:  General: NAD  Eyes: sclera anicteric  Oral Cavity: No thrush or ulcers  Neck: no JVD  Chest: Fair bilateral air entry  Heart: normal sounds  Abdomen: soft and non tender   : no peters  Lower Extremities: no edema  Skin: no rash  Neuro: intact  Psychiatric: non-depressed            Assessment:     Hospital Problems  Never Reviewed            Codes Class Noted POA    Lung cancer ([facility] ICD-10-CM: C34.90  ICD-9-CM: 162.9  3/11/2023 Unknown        PNA (pneumonia) ICD-10-CM: J18.9  ICD-9-CM: 847  3/11/2023 Unknown           Plan:     #1 Hyponatremia.    -Of moderate severity. -Acute on chronic.  -looks euvolemic  -Etiology can be underlying SIADH in the setting of lung cancer.  -On 3/11 sodium was 126 did improve 131 and then dropped again to 128.  -I will send urine sodium and urine osmolality.  -I will consider isotonic IV fluids only in the setting of low urine sodium.  -I will put her on free water restriction 1200 cc per 24 hours.  -I will put her on Urea 15 gm bid.  -I will consider giving tolvaptan in the setting of worsening hyponatremia.  -Not on thiazides or SSRIs.  -She is not symptomatic. #2 acute hypoxic respiratory failure.  -Possibly pneumonia/COVID infection/history of lung cancer  -She came with shortness of breath and sats in 88%  -On nasal cannula  -CTA chest small pleural effusion left-sided and otherwise no significant infiltrates  -COVID test positive on 3/4  -On Zithromax and Solu-Medrol  -Pulmonology is on board. #3 hypertension.  -Stable blood pressures  -Continue lisinopril 5 mg and metoprolol 25 mg twice a day. #4 history of CHF:  -Per echocardiogram last LVEF 50 to 55%  -She looks clinically compensated  -No evidence of fluid overload  -No need for IV Lasix that it can further lower her sodium. #5 history of DVT. On Eliquis    #6 history of lung cancer. Adenocarcinoma of the lung diagnosed in August 2022  On chemotherapy    Thank you for the consultation.     Signed By: Fallon Méndez MD     March 14, 2023

## 2023-03-14 NOTE — PROGRESS NOTES
Pulmonary Progress Note      NAME: Gladys Downey   :  1940  MRM:  [id number]    Date/Time: 3/14/2023  6:23 PM         Subjective:     Patient seen and examined  Overnight events noted    Sitting up in the chair comfortably  On room air  No acute distress  Family at bedside      Past Medical History reviewed and unchanged from Admission History and Physical       Objective:     Physical Exam     Vitals:      Last 24hrs VS reviewed since prior progress note. Most recent are:    Visit Vitals  /68 (BP 1 Location: Left upper arm, BP Patient Position: Sitting)   Pulse 98   Temp 97.2 °F (36.2 °C)   Resp 18   Ht 5' 3\" (1.6 m)   Wt 49 kg (108 lb)   SpO2 98%   BMI 19.13 kg/m²     SpO2 Readings from Last 6 Encounters:   23 98%   22 98%   22 95%        No intake or output data in the 24 hours ending 23 1212     Physical Exam:  Patient is a elderly white female who is on room air. Not in any distress. Vital signs as above. HEENT examination show pupils are equal and reactive to light. No significant pallor. No icterus. Nasal passages are patent. Oropharynx is without thrush. Neck is supple and trachea central.  No JVD or lymphadenopathy. She is not using accessory muscles of respiration. Chest is symmetrical with equal and fair air entry bilaterally. She is diminished breath sounds over the left lung base with some crackles. Occasional scattered wheezes,  Improved. No chest wall tenderness. Rhythm is regular without any loud murmur or gallop. Sinus tachycardia intermittently is noted on the monitor. Abdomen is obese and benign. Bowel sounds audible. No masses or organomegaly. Extremities do not show any cyanosis or clubbing. No significant pitting edema. Pulses are palpable. Neurologic system examination is grossly intact. Skin is warm and dry. CTA CHEST W OR W WO CONT   Final Result   1. Small left pleural effusion.  Debris or carcinomatosis in the left pleural space.   2. Tight left lower lobe collapse. Narrowing of the left pulmonary arteries. XR CHEST PORT   Final Result   Interval increase in size of small left pleural effusion and left   basilar consolidation.               Lab Data Reviewed: (see below)      Medications:  Current Facility-Administered Medications   Medication Dose Route Frequency    methylPREDNISolone (PF) (SOLU-MEDROL) injection 40 mg  40 mg IntraVENous Q12H    apixaban (ELIQUIS) tablet 2.5 mg  2.5 mg Oral BID    lisinopriL (PRINIVIL, ZESTRIL) tablet 5 mg  5 mg Oral DAILY    metoprolol tartrate (LOPRESSOR) tablet 25 mg  25 mg Oral BID    benzonatate (TESSALON) capsule 100 mg  100 mg Oral TID PRN    ascorbic acid (vitamin C) (VITAMIN C) tablet 500 mg  500 mg Oral BID    sodium chloride (NS) flush 5-40 mL  5-40 mL IntraVENous Q8H    sodium chloride (NS) flush 5-40 mL  5-40 mL IntraVENous PRN    acetaminophen (TYLENOL) tablet 650 mg  650 mg Oral Q6H PRN    Or    acetaminophen (TYLENOL) suppository 650 mg  650 mg Rectal Q6H PRN    polyethylene glycol (MIRALAX) packet 17 g  17 g Oral DAILY PRN    ondansetron (ZOFRAN ODT) tablet 4 mg  4 mg Oral Q8H PRN    Or    ondansetron (ZOFRAN) injection 4 mg  4 mg IntraVENous Q6H PRN    azithromycin (ZITHROMAX) 500 mg in 0.9% sodium chloride 250 mL ([id number])  500 mg IntraVENous Q24H    zinc sulfate (ZINCATE) 50 mg zinc (220 mg) capsule 1 Capsule  1 Capsule Oral DAILY    guaiFENesin ER (MUCINEX) tablet 600 mg  600 mg Oral Q12H    albuterol (PROVENTIL HFA, VENTOLIN HFA, PROAIR HFA) inhaler 2 Puff  2 Puff Inhalation Q4H PRN       ______________________________________________________________________      Lab Review:     Recent Labs     03/14/23  0712 03/12/23  0942   WBC 11.7* 9.4   HGB 12.4 12.6   HCT 36.7 37.2    310       Recent Labs     03/14/23  0712 03/12/23  0942   * 131*   K 4.4 3.3*   CL 99 99   CO2 24 26   * 178*   BUN 36* 23*   CREA 1.03* 1.18*   CA 8.6 8.8   ALB 3.5  --    ALT 39 --        No components found for: GLPOC  No results for input(s): PH, PCO2, PO2, HCO3, FIO2 in the last 72 hours. No results for input(s): INR, INREXT, INREXT, INREXT in the last 72 hours. Other pertinent lab:   Laboratory data on admit:  Portable chest x-ray done 3/11 shows slight increase in the left pleural effusion as compared to a prior study of January 2022. CTA of the chest was obtained 3/11/23. No pulmonary embolism is seen, personally reviewed. However the report does not mention specifically the absence of pulmonary embolism. There is tight collapse of the left lower lobe described. According to the report the left lower lobe pulmonary artery is narrowed. There is a small left pleural effusion with possible debris in it. She has chronic left-sided rib fractures. There are no comparison. There is mild mosaic attenuation of the right lung. Electrolytes are within normal limits. BUN is 16 creatinine is 1.81. Blood glucose of 94. WBC count 12.2 with neutrophils of 82% hemoglobin is 11.1 and a platelet count of 449. Lactic acid 1.0 troponin 12. BNP is 1/2/2005. Rapid COVID test is positive. Assessment & Plan:      1. The patient has COVID-19. CT of the chest does not show any focal pneumonia. There is some debris noted in the lingular bronchus. There are changes of chronic left pleural effusion with left lower lobe atelectasis/collapse which is related to her history of lung cancer. She recently completed a course of Augmentin. Agree with starting her on azithromycin for atypical coverage and IV Solu-Medrol. I added guaifenesin. Continue with albuterol inhaler on a as needed basis. She is started on ascorbic acid and zinc.  She is currently on room air. She does not need any specific treatment for COVID-19. She has been fully vaccinated. 2.  History of lung cancer. Status posttreatment but it appears to be recurrent.   She had a PET scan done on 2/28/2023 at [facility] which showed partial response. Input from oncology appreciated. She is on second line treatment with weekly Taxotere and has been tolerating it well. 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 3.  History of DVT. She is on Eliquis 2.5 mg twice daily. 4.  History of cardiomyopathy with a EF of 25% according to the daughter. Echocardiogram would be helpful. Questions of patient and family were answered at bedside in detail  Case discussed in detail with RN, RT, and care team  Thank you for involving me in the care of this patient  I will follow with you closely during hospitalization    Time spent more than 30 minutes under patient care with no overlap reviewing results and records, decision making, and answering questions.     Nic Galeana MD  Pulmonary/CC

## 2023-03-15 LAB
ALBUMIN SERPL-MCNC: 3.4 G/DL (ref 3.5–5)
ALBUMIN/GLOB SERPL: 0.8 (ref 1.1–2.2)
ALP SERPL-CCNC: 61 U/L (ref 45–117)
ALT SERPL-CCNC: 43 U/L (ref 12–78)
ANION GAP SERPL CALC-SCNC: 5 MMOL/L (ref 5–15)
AST SERPL W P-5'-P-CCNC: 25 U/L (ref 15–37)
BASOPHILS # BLD: 0 K/UL (ref 0–0.1)
BASOPHILS NFR BLD: 0 % (ref 0–1)
BILIRUB SERPL-MCNC: 0.6 MG/DL (ref 0.2–1)
BUN SERPL-MCNC: 47 MG/DL (ref 6–20)
BUN/CREAT SERPL: 51 (ref 12–20)
CA-I BLD-MCNC: 9 MG/DL (ref 8.5–10.1)
CHLORIDE SERPL-SCNC: 102 MMOL/L (ref 97–108)
CO2 SERPL-SCNC: 24 MMOL/L (ref 21–32)
CREAT SERPL-MCNC: 0.93 MG/DL (ref 0.55–1.02)
DIFFERENTIAL METHOD BLD: ABNORMAL
EOSINOPHIL # BLD: 0.1 K/UL (ref 0–0.4)
EOSINOPHIL NFR BLD: 1 % (ref 0–7)
ERYTHROCYTE [DISTWIDTH] IN BLOOD BY AUTOMATED COUNT: 14.5 % (ref 11.5–14.5)
GLOBULIN SER CALC-MCNC: 4.4 G/DL (ref 2–4)
GLUCOSE BLD STRIP.AUTO-MCNC: 112 MG/DL (ref 65–100)
GLUCOSE SERPL-MCNC: 92 MG/DL (ref 65–100)
HCT VFR BLD AUTO: 43.1 % (ref 35–47)
HGB BLD-MCNC: 14.3 G/DL (ref 11.5–16)
IMM GRANULOCYTES # BLD AUTO: 0.1 K/UL (ref 0–0.04)
IMM GRANULOCYTES NFR BLD AUTO: 1 % (ref 0–0.5)
LYMPHOCYTES # BLD: 1.7 K/UL (ref 0.8–3.5)
LYMPHOCYTES NFR BLD: 15 % (ref 12–49)
MCH RBC QN AUTO: 29.2 PG (ref 26–34)
MCHC RBC AUTO-ENTMCNC: 33.2 G/DL (ref 30–36.5)
MCV RBC AUTO: 88 FL (ref 80–99)
MONOCYTES # BLD: 1 K/UL (ref 0–1)
MONOCYTES NFR BLD: 9 % (ref 5–13)
NEUTS SEG # BLD: 8.2 K/UL (ref 1.8–8)
NEUTS SEG NFR BLD: 74 % (ref 32–75)
NRBC # BLD: 0 K/UL (ref 0–0.01)
NRBC BLD-RTO: 0 PER 100 WBC
PERFORMED BY, TECHID: ABNORMAL
PLATELET # BLD AUTO: 336 K/UL (ref 150–400)
PMV BLD AUTO: 9.3 FL (ref 8.9–12.9)
POTASSIUM SERPL-SCNC: 3.9 MMOL/L (ref 3.5–5.1)
PROT SERPL-MCNC: 7.8 G/DL (ref 6.4–8.2)
RBC # BLD AUTO: 4.9 M/UL (ref 3.8–5.2)
SODIUM SERPL-SCNC: 131 MMOL/L (ref 136–145)
WBC # BLD AUTO: 11.1 K/UL (ref 3.6–11)

## 2023-03-15 PROCEDURE — 83935 ASSAY OF URINE OSMOLALITY: CPT

## 2023-03-15 PROCEDURE — 82962 GLUCOSE BLOOD TEST: CPT

## 2023-03-15 PROCEDURE — 80053 COMPREHEN METABOLIC PANEL: CPT

## 2023-03-15 PROCEDURE — 85025 COMPLETE CBC W/AUTO DIFF WBC: CPT

## 2023-03-15 PROCEDURE — 84300 ASSAY OF URINE SODIUM: CPT

## 2023-03-15 PROCEDURE — 65270000029 HC RM PRIVATE

## 2023-03-15 PROCEDURE — 74011000250 HC RX REV CODE- 250: Performed by: PHYSICIAN ASSISTANT

## 2023-03-15 PROCEDURE — 74011250636 HC RX REV CODE- 250/636: Performed by: HOSPITALIST

## 2023-03-15 PROCEDURE — 74011250636 HC RX REV CODE- 250/636: Performed by: NURSE PRACTITIONER

## 2023-03-15 PROCEDURE — 74011250637 HC RX REV CODE- 250/637: Performed by: INTERNAL MEDICINE

## 2023-03-15 PROCEDURE — 74011250637 HC RX REV CODE- 250/637: Performed by: PHYSICIAN ASSISTANT

## 2023-03-15 RX ADMIN — SODIUM CHLORIDE, PRESERVATIVE FREE 10 ML: 5 INJECTION INTRAVENOUS at 22:28

## 2023-03-15 RX ADMIN — APIXABAN 2.5 MG: 2.5 TABLET, FILM COATED ORAL at 20:44

## 2023-03-15 RX ADMIN — Medication 1 PACKET: at 11:39

## 2023-03-15 RX ADMIN — METHYLPREDNISOLONE SODIUM SUCCINATE 40 MG: 40 INJECTION, POWDER, FOR SOLUTION INTRAMUSCULAR; INTRAVENOUS at 10:09

## 2023-03-15 RX ADMIN — METOPROLOL TARTRATE 25 MG: 25 TABLET, FILM COATED ORAL at 10:09

## 2023-03-15 RX ADMIN — OXYCODONE HYDROCHLORIDE AND ACETAMINOPHEN 500 MG: 500 TABLET ORAL at 10:09

## 2023-03-15 RX ADMIN — SODIUM CHLORIDE, PRESERVATIVE FREE 10 ML: 5 INJECTION INTRAVENOUS at 17:30

## 2023-03-15 RX ADMIN — METOPROLOL TARTRATE 25 MG: 25 TABLET, FILM COATED ORAL at 20:46

## 2023-03-15 RX ADMIN — GUAIFENESIN 600 MG: 600 TABLET, EXTENDED RELEASE ORAL at 20:44

## 2023-03-15 RX ADMIN — Medication 1 PACKET: at 20:44

## 2023-03-15 RX ADMIN — ZINC SULFATE 220 MG (50 MG) CAPSULE 1 CAPSULE: CAPSULE at 10:09

## 2023-03-15 RX ADMIN — OXYCODONE HYDROCHLORIDE AND ACETAMINOPHEN 500 MG: 500 TABLET ORAL at 20:44

## 2023-03-15 RX ADMIN — APIXABAN 2.5 MG: 2.5 TABLET, FILM COATED ORAL at 10:09

## 2023-03-15 RX ADMIN — GUAIFENESIN 600 MG: 600 TABLET, EXTENDED RELEASE ORAL at 10:09

## 2023-03-15 RX ADMIN — LISINOPRIL 5 MG: 5 TABLET ORAL at 10:09

## 2023-03-15 RX ADMIN — AZITHROMYCIN MONOHYDRATE 500 MG: 500 INJECTION, POWDER, LYOPHILIZED, FOR SOLUTION INTRAVENOUS at 11:39

## 2023-03-15 NOTE — PROGRESS NOTES
Pulmonary Progress Note      NAME: Loco Ho   :  1940  MRM:  [id number]    Date/Time: 3/15/2023  6:23 PM         Subjective:     Patient seen and examined  Overnight events noted    Sitting up in the chair comfortably  On room air  No acute distress  Family at bedside      Past Medical History reviewed and unchanged from Admission History and Physical       Objective:     Physical Exam     Vitals:      Last 24hrs VS reviewed since prior progress note. Most recent are:    Visit Vitals  /63 (BP 1 Location: Left upper arm, BP Patient Position: Sitting)   Pulse 86   Temp 97.7 °F (36.5 °C)   Resp 18   Ht 5' 3\" (1.6 m)   Wt 49 kg (108 lb)   SpO2 99%   BMI 19.13 kg/m²     SpO2 Readings from Last 6 Encounters:   03/15/23 99%   22 98%   22 95%        No intake or output data in the 24 hours ending 03/15/23 1050     Physical Exam:  Patient is a elderly white female who is on room air. Not in any distress. Vital signs as above. HEENT examination show pupils are equal and reactive to light. No significant pallor. No icterus. Nasal passages are patent. Oropharynx is without thrush. Neck is supple and trachea central.  No JVD or lymphadenopathy. She is not using accessory muscles of respiration. Chest is symmetrical with equal and fair air entry bilaterally. She is diminished breath sounds over the left lung base with some crackles. Occasional scattered wheezes,  Improved. No chest wall tenderness. Rhythm is regular without any loud murmur or gallop. Sinus tachycardia intermittently is noted on the monitor. Abdomen is obese and benign. Bowel sounds audible. No masses or organomegaly. Extremities do not show any cyanosis or clubbing. No significant pitting edema. Pulses are palpable. Neurologic system examination is grossly intact. Skin is warm and dry. CTA CHEST W OR W WO CONT   Final Result   1. Small left pleural effusion.  Debris or carcinomatosis in the left pleural space.   2. Tight left lower lobe collapse. Narrowing of the left pulmonary arteries. XR CHEST PORT   Final Result   Interval increase in size of small left pleural effusion and left   basilar consolidation.               Lab Data Reviewed: (see below)      Medications:  Current Facility-Administered Medications   Medication Dose Route Frequency    diphenhydrAMINE (BENADRYL) capsule 25 mg  25 mg Oral QHS PRN    urea (URE-NA) 15 gram packet 1 Packet  1 Packet Oral BID    methylPREDNISolone (PF) (SOLU-MEDROL) injection 40 mg  40 mg IntraVENous Q12H    apixaban (ELIQUIS) tablet 2.5 mg  2.5 mg Oral BID    lisinopriL (PRINIVIL, ZESTRIL) tablet 5 mg  5 mg Oral DAILY    metoprolol tartrate (LOPRESSOR) tablet 25 mg  25 mg Oral BID    benzonatate (TESSALON) capsule 100 mg  100 mg Oral TID PRN    ascorbic acid (vitamin C) (VITAMIN C) tablet 500 mg  500 mg Oral BID    sodium chloride (NS) flush 5-40 mL  5-40 mL IntraVENous Q8H    sodium chloride (NS) flush 5-40 mL  5-40 mL IntraVENous PRN    acetaminophen (TYLENOL) tablet 650 mg  650 mg Oral Q6H PRN    Or    acetaminophen (TYLENOL) suppository 650 mg  650 mg Rectal Q6H PRN    polyethylene glycol (MIRALAX) packet 17 g  17 g Oral DAILY PRN    ondansetron (ZOFRAN ODT) tablet 4 mg  4 mg Oral Q8H PRN    Or    ondansetron (ZOFRAN) injection 4 mg  4 mg IntraVENous Q6H PRN    azithromycin (ZITHROMAX) 500 mg in 0.9% sodium chloride 250 mL ([id number])  500 mg IntraVENous Q24H    zinc sulfate (ZINCATE) 50 mg zinc (220 mg) capsule 1 Capsule  1 Capsule Oral DAILY    guaiFENesin ER (MUCINEX) tablet 600 mg  600 mg Oral Q12H    albuterol (PROVENTIL HFA, VENTOLIN HFA, PROAIR HFA) inhaler 2 Puff  2 Puff Inhalation Q4H PRN       ______________________________________________________________________      Lab Review:     Recent Labs     03/15/23  0728 03/14/23 0712   WBC 11.1* 11.7*   HGB 14.3 12.4   HCT 43.1 36.7    319       Recent Labs     03/14/23 0712   *   K 4.4   CL 99 CO2 24   *   BUN 36*   CREA 1.03*   CA 8.6   ALB 3.5   ALT 39       No components found for: GLPOC  No results for input(s): PH, PCO2, PO2, HCO3, FIO2 in the last 72 hours. No results for input(s): INR, INREXT, INREXT, INREXT in the last 72 hours. Other pertinent lab:   Laboratory data on admit:  Portable chest x-ray done 3/11 shows slight increase in the left pleural effusion as compared to a prior study of January 2022. CTA of the chest was obtained 3/11/23. No pulmonary embolism is seen, personally reviewed. However the report does not mention specifically the absence of pulmonary embolism. There is tight collapse of the left lower lobe described. According to the report the left lower lobe pulmonary artery is narrowed. There is a small left pleural effusion with possible debris in it. She has chronic left-sided rib fractures. There are no comparison. There is mild mosaic attenuation of the right lung. Electrolytes are within normal limits. BUN is 16 creatinine is 1.81. Blood glucose of 94. WBC count 12.2 with neutrophils of 82% hemoglobin is 11.1 and a platelet count of 144. Lactic acid 1.0 troponin 12. BNP is 1/2/2005. Rapid COVID test is positive. Assessment & Plan:      1. The patient has COVID-19. CT of the chest does not show any focal pneumonia. There is some debris noted in the lingular bronchus. There are changes of chronic left pleural effusion with left lower lobe atelectasis/collapse which is related to her history of lung cancer. She recently completed a course of Augmentin. Agree with starting her on azithromycin for atypical coverage and IV Solu-Medrol. I added guaifenesin. Continue with albuterol inhaler on a as needed basis. She is started on ascorbic acid and zinc.  She is currently on room air. She does not need any specific treatment for COVID-19. She has been fully vaccinated. 2.  History of lung cancer.   Status posttreatment but it appears to be recurrent. She had a PET scan done on 2/28/2023 at [facility] which showed partial response. Input from oncology appreciated. She is on second line treatment with weekly Taxotere and has been tolerating it well. 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 3.  History of DVT. She is on Eliquis 2.5 mg twice daily. 4.  History of cardiomyopathy with a EF of 25% according to the daughter. Echocardiogram would be helpful. Questions of patient and family were answered at bedside in detail  Case discussed in detail with RN, RT, and care team  Thank you for involving me in the care of this patient  I will follow with you closely during hospitalization    Time spent more than 30 minutes under patient care with no overlap reviewing results and records, decision making, and answering questions.     Rama Lucas MD  Pulmonary/CC

## 2023-03-15 NOTE — PROGRESS NOTES
03/15/23 0914   Peripheral IV 03/15/23 Left Brachial   Placement Date/Time: 03/15/23 0913   Number of Attempts: 1  Inserted By: (c) Jhoan Syed  Size: 20 G  Orientation: Left  Location: Brachial   Site Assessment Clean, dry, & intact   Phlebitis Assessment 0   Infiltration Assessment 0   Dressing Status Clean, dry, & intact   Dressing Type Transparent   Hub Color/Line Status Pink   Alcohol Cap Used Yes   Piv started with ultrasound. Pt tolerated well. Nurse to wrap.

## 2023-03-15 NOTE — PROGRESS NOTES
Hospitalist Progress Note            Daily Progress Note: 3/15/2023 11:40 AM  Hospital course:   Ellie Brink is a 80 y.o. female with a history of COPD, lung cancer, hypothyroidism that presented to the emergency room on 3/11/2023 for 1 week history of worsening shortness of breath. Patient's pulmonologist is Dr. Mariah Devi. Oncologist is Dr. Fuentes Osman. Patient tested positive for COVID on 3/4/2023. Jazzmine Velazquez She called her primary care and was given Augmentin p.o. however per daughter at bedside says that over the course the last week she has had worsening shortness of breath, wheezing, nonproductive cough. In the ED patient was found to be acutely hypoxic with an oxygen saturation of 88. She was placed on oxygen nasal cannula. Then weaned off quickly. Chest x-ray showed interval increase in size of small left pleural effusion and left basilar consolidation. CTA of the chest showed small left pleural effusion, debris or carcinomatosis in the left pleural space, trace left lower lobe collapse, narrowing of the left pulmonary arteries. Laboratory data was significant for a BNP of 1205. Troponin x1 negative. Sodium 126. WBC 12.2. Rapid COVID is pending. Was requested admission for hyponatremia, COVID-positive, increasing shortness of breath. Started on fluid restriction and IV Lasix 40 mg daily. Also started on IV Solu-Medrol and IV azithromycin. Tessalon as needed for cough. Pulmonology and oncology consulted. Portable chest x-ray done 3/11 shows slight increase in the left pleural effusion as compared to a prior study of January 2022. CTA of the chest was obtained 3/11/23. IMPRESSION  1. Small left pleural effusion. Debris or carcinomatosis in the left pleural  space. 2. Tight left lower lobe collapse. Narrowing of the left pulmonary arteries. 3/14: Sodium level 128 this morning. Nephrology consult pending  315 sodium level improved to 131 this am. She is on fluid restriction.  Family at bedside. Updated with plan of care. She was started on urea per Nephrology. Subjective:     Ms. Carolyn Gutierrez seen resting comfortably on the medical floor. She is on room air. Family at the bedside. Ms. Carolyn Gutierrez voices no complaints at time of exam.  Assessment/Plan:   Active Problems:    Lung cancer ([address] (3/11/2023)      PNA (pneumonia) (3/11/2023)    Acute Hypoxic Respiratory failure 2/2 COVID (Resolved)  -currently on room air with oxygenation saturation level 99%. -She does not use oxygen at home. - continue IV Azithromycin   -Continue IV solu-medrol daily  -continue guaifenesin for cough    History of Lung Cancer  -Oncology input appreciated  -Taxotere weekly per Oncology    CHFrEF of 20-25%  -She is followed by Dr. Chanda Davis cardiology  -not currently on diuretics  - BNP on 3/11/23 1,205  -status post IV Lasix x2    Hyponatremia  -sodium 128 decline from previous day of 131  -fluid Restriction  -Nephrology consult    Hypertension  -Lisinopril 5 mg daily  -Metoprolol 25 mg twice daily  -Telemetry monitoring    History of DVT  -Continue Eliquis 2.5 mg twice daily    Social Determinants of Health: Cognitive deficits at times.     DVT Prophylaxis: Eliquis  Code Status: Full Code  POA/NOK:    Disposition and discharge barriers:   IV Steroids  Pulmonary clearance  Nephrology clearance  Care Plan discussed with: Patient, RN, case management    Current Facility-Administered Medications   Medication Dose Route Frequency    [START ON 3/16/2023] methylPREDNISolone (PF) (SOLU-MEDROL) injection 40 mg  40 mg IntraVENous DAILY    diphenhydrAMINE (BENADRYL) capsule 25 mg  25 mg Oral QHS PRN    urea (URE-NA) 15 gram packet 1 Packet  1 Packet Oral BID    apixaban (ELIQUIS) tablet 2.5 mg  2.5 mg Oral BID    lisinopriL (PRINIVIL, ZESTRIL) tablet 5 mg  5 mg Oral DAILY    metoprolol tartrate (LOPRESSOR) tablet 25 mg  25 mg Oral BID    benzonatate (TESSALON) capsule 100 mg  100 mg Oral TID PRN    ascorbic acid (vitamin C) (VITAMIN C) tablet 500 mg  500 mg Oral BID    sodium chloride (NS) flush 5-40 mL  5-40 mL IntraVENous Q8H    sodium chloride (NS) flush 5-40 mL  5-40 mL IntraVENous PRN    acetaminophen (TYLENOL) tablet 650 mg  650 mg Oral Q6H PRN    Or    acetaminophen (TYLENOL) suppository 650 mg  650 mg Rectal Q6H PRN    polyethylene glycol (MIRALAX) packet 17 g  17 g Oral DAILY PRN    ondansetron (ZOFRAN ODT) tablet 4 mg  4 mg Oral Q8H PRN    Or    ondansetron (ZOFRAN) injection 4 mg  4 mg IntraVENous Q6H PRN    azithromycin (ZITHROMAX) 500 mg in 0.9% sodium chloride 250 mL ([id number])  500 mg IntraVENous Q24H    zinc sulfate (ZINCATE) 50 mg zinc (220 mg) capsule 1 Capsule  1 Capsule Oral DAILY    guaiFENesin ER (MUCINEX) tablet 600 mg  600 mg Oral Q12H    albuterol (PROVENTIL HFA, VENTOLIN HFA, PROAIR HFA) inhaler 2 Puff  2 Puff Inhalation Q4H PRN        REVIEW OF SYSTEMS    Review of Systems   Constitutional:  Negative for chills and fever. HENT:  Positive for hearing loss. Eyes: Negative. Respiratory:  Positive for cough. Negative for sputum production, shortness of breath and wheezing. Cardiovascular:  Negative for chest pain and leg swelling. Gastrointestinal:  Negative for abdominal pain, heartburn, nausea and vomiting. Skin: Negative. Neurological:  Negative for dizziness, weakness and headaches. Objective:     Visit Vitals  BP (!) 104/57 (BP 1 Location: Right upper arm, BP Patient Position: At rest)   Pulse 98   Temp 97.6 °F (36.4 °C)   Resp 18   Ht 5' 3\" (1.6 m)   Wt 49 kg (108 lb)   SpO2 98%   BMI 19.13 kg/m²      O2 Device: None (Room air)    Temp (24hrs), Av.7 °F (36.5 °C), Min:97.6 °F (36.4 °C), Max:97.7 °F (36.5 °C)        PHYSICAL EXAM:    Physical Exam  Constitutional:       Appearance: Normal appearance. HENT:      Head: Normocephalic and atraumatic. Cardiovascular:      Rate and Rhythm: Normal rate and regular rhythm. Heart sounds: No murmur heard. No gallop.    Pulmonary:      Breath sounds: Wheezing present. Comments: Expiratory wheeze  Abdominal:      General: Bowel sounds are normal. There is no distension. Palpations: Abdomen is soft. Tenderness: There is no abdominal tenderness. Musculoskeletal:         General: No swelling or tenderness. Right lower leg: Edema present. Left lower leg: No edema. Comments: Generalized weakness. Skin:     General: Skin is warm and dry. Coloration: Skin is pale. Neurological:      Mental Status: She is alert. Comments: Confused at times   Psychiatric:         Behavior: Behavior normal.        Data Review    Recent Results (from the past 24 hour(s))   METABOLIC PANEL, COMPREHENSIVE    Collection Time: 03/15/23  7:28 AM   Result Value Ref Range    Sodium 131 (L) 136 - 145 mmol/L    Potassium 3.9 3.5 - 5.1 mmol/L    Chloride 102 97 - 108 mmol/L    CO2 24 21 - 32 mmol/L    Anion gap 5 5 - 15 mmol/L    Glucose 92 65 - 100 mg/dL    BUN 47 (H) 6 - 20 mg/dL    Creatinine 0.93 0.55 - 1.02 mg/dL    BUN/Creatinine ratio 51 (H) 12 - 20      eGFR >60 >60 ml/min/1.73m2    Calcium 9.0 8.5 - 10.1 mg/dL    Bilirubin, total 0.6 0.2 - 1.0 mg/dL    AST (SGOT) 25 15 - 37 U/L    ALT (SGPT) 43 12 - 78 U/L    Alk.  phosphatase 61 45 - 117 U/L    Protein, total 7.8 6.4 - 8.2 g/dL    Albumin 3.4 (L) 3.5 - 5.0 g/dL    Globulin 4.4 (H) 2.0 - 4.0 g/dL    A-G Ratio 0.8 (L) 1.1 - 2.2     CBC WITH AUTOMATED DIFF    Collection Time: 03/15/23  7:28 AM   Result Value Ref Range    WBC 11.1 (H) 3.6 - 11.0 K/uL    RBC 4.90 3.80 - 5.20 M/uL    HGB 14.3 11.5 - 16.0 g/dL    HCT 43.1 35.0 - 47.0 %    MCV 88.0 80.0 - 99.0 FL    MCH 29.2 26.0 - 34.0 PG    MCHC 33.2 30.0 - 36.5 g/dL    RDW 14.5 11.5 - 14.5 %    PLATELET 824 097 - 664 K/uL    MPV 9.3 8.9 - 12.9 FL    NRBC 0.0 0.0  WBC    ABSOLUTE NRBC 0.00 0.00 - 0.01 K/uL    NEUTROPHILS 74 32 - 75 %    LYMPHOCYTES 15 12 - 49 %    MONOCYTES 9 5 - 13 %    EOSINOPHILS 1 0 - 7 %    BASOPHILS 0 0 - 1 % IMMATURE GRANULOCYTES 1 (H) 0 - 0.5 %    ABS. NEUTROPHILS 8.2 (H) 1.8 - 8.0 K/UL    ABS. LYMPHOCYTES 1.7 0.8 - 3.5 K/UL    ABS. MONOCYTES 1.0 0.0 - 1.0 K/UL    ABS. EOSINOPHILS 0.1 0.0 - 0.4 K/UL    ABS. BASOPHILS 0.0 0.0 - 0.1 K/UL    ABS. IMM. GRANS. 0.1 (H) 0.00 - 0.04 K/UL    DF AUTOMATED     GLUCOSE, POC    Collection Time: 03/15/23  8:07 AM   Result Value Ref Range    Glucose (POC) 112 (H) 65 - 100 mg/dL    Performed by "[employer] Bis        CTA CHEST W OR W WO CONT   Final Result   1. Small left pleural effusion. Debris or carcinomatosis in the left pleural   space. 2. Tight left lower lobe collapse. Narrowing of the left pulmonary arteries. XR CHEST PORT   Final Result   Interval increase in size of small left pleural effusion and left   basilar consolidation. Intake and Output:  Current Shift: No intake/output data recorded. Last three shifts: No intake/output data recorded. Lab/Data Review:  Recent Labs     03/15/23  0728 03/14/23  0712   WBC 11.1* 11.7*   HGB 14.3 12.4   HCT 43.1 36.7    319       Recent Labs     03/15/23  0728 03/14/23  0712   * 128*   K 3.9 4.4    99   CO2 24 24   GLU 92 129*   BUN 47* 36*   CREA 0.93 1.03*   CA 9.0 8.6   ALB 3.4* 3.5   TBILI 0.6 0.6   ALT 43 39       No results for input(s): PH, PCO2, PO2, HCO3, FIO2 in the last 72 hours. Recent Results (from the past 24 hour(s))   METABOLIC PANEL, COMPREHENSIVE    Collection Time: 03/15/23  7:28 AM   Result Value Ref Range    Sodium 131 (L) 136 - 145 mmol/L    Potassium 3.9 3.5 - 5.1 mmol/L    Chloride 102 97 - 108 mmol/L    CO2 24 21 - 32 mmol/L    Anion gap 5 5 - 15 mmol/L    Glucose 92 65 - 100 mg/dL    BUN 47 (H) 6 - 20 mg/dL    Creatinine 0.93 0.55 - 1.02 mg/dL    BUN/Creatinine ratio 51 (H) 12 - 20      eGFR >60 >60 ml/min/1.73m2    Calcium 9.0 8.5 - 10.1 mg/dL    Bilirubin, total 0.6 0.2 - 1.0 mg/dL    AST (SGOT) 25 15 - 37 U/L    ALT (SGPT) 43 12 - 78 U/L    Alk.  phosphatase 61 45 - 117 U/L    Protein, total 7.8 6.4 - 8.2 g/dL    Albumin 3.4 (L) 3.5 - 5.0 g/dL    Globulin 4.4 (H) 2.0 - 4.0 g/dL    A-G Ratio 0.8 (L) 1.1 - 2.2     CBC WITH AUTOMATED DIFF    Collection Time: 03/15/23  7:28 AM   Result Value Ref Range    WBC 11.1 (H) 3.6 - 11.0 K/uL    RBC 4.90 3.80 - 5.20 M/uL    HGB 14.3 11.5 - 16.0 g/dL    HCT 43.1 35.0 - 47.0 %    MCV 88.0 80.0 - 99.0 FL    MCH 29.2 26.0 - 34.0 PG    MCHC 33.2 30.0 - 36.5 g/dL    RDW 14.5 11.5 - 14.5 %    PLATELET 817 292 - 450 K/uL    MPV 9.3 8.9 - 12.9 FL    NRBC 0.0 0.0  WBC    ABSOLUTE NRBC 0.00 0.00 - 0.01 K/uL    NEUTROPHILS 74 32 - 75 %    LYMPHOCYTES 15 12 - 49 %    MONOCYTES 9 5 - 13 %    EOSINOPHILS 1 0 - 7 %    BASOPHILS 0 0 - 1 %    IMMATURE GRANULOCYTES 1 (H) 0 - 0.5 %    ABS. NEUTROPHILS 8.2 (H) 1.8 - 8.0 K/UL    ABS. LYMPHOCYTES 1.7 0.8 - 3.5 K/UL    ABS. MONOCYTES 1.0 0.0 - 1.0 K/UL    ABS. EOSINOPHILS 0.1 0.0 - 0.4 K/UL    ABS. BASOPHILS 0.0 0.0 - 0.1 K/UL    ABS. IMM. GRANS. 0.1 (H) 0.00 - 0.04 K/UL    DF AUTOMATED     GLUCOSE, POC    Collection Time: 03/15/23  8:07 AM   Result Value Ref Range    Glucose (POC) 112 (H) 65 - 100 mg/dL    Performed by Lakhwinder Isabel            _____________________________________________________________________________  Time spent in direct care including coordination of service, review of data and examination: > 35 minutes    ______________________________________________________________________________    Kassi Pritchett NP    This is dictation was done by dragon, computer voice recognition software. Quite often unanticipated grammatical, syntax, homophones and other interpretive errors or inadvertently transcribed by the computer software. Please excuse errors that have escaped final proofreading. Thank you.

## 2023-03-15 NOTE — PROGRESS NOTES
Problem: Gas Exchange - Impaired  Goal: Promote optimal lung function  Outcome: Progressing Towards Goal     Problem: Isolation Precautions - Risk of Spread of Infection  Goal: Prevent transmission of infectious organism to others  Outcome: Progressing Towards Goal     Problem: Nutrition Deficits  Goal: Optimize nutrtional status  Outcome: Progressing Towards Goal     Problem: Risk for Fluid Volume Deficit  Goal: Maintain normal heart rhythm  Outcome: Progressing Towards Goal  Goal: Maintain absence of muscle cramping  Outcome: Progressing Towards Goal  Goal: Maintain normal serum potassium, sodium, calcium, phosphorus, and pH  Outcome: Progressing Towards Goal     Problem: Loneliness or Risk for Loneliness  Goal: Demonstrate positive use of time alone when socialization is not possible  Outcome: Progressing Towards Goal     Problem: Fatigue  Goal: Verbalize increase energy and improved vitality  Outcome: Progressing Towards Goal     Problem: Patient Education: Go to Patient Education Activity  Goal: Patient/Family Education  Outcome: Progressing Towards Goal     Problem: Falls - Risk of  Goal: *Absence of Falls  Description: Document Tori Fall Risk and appropriate interventions in the flowsheet.   Outcome: Progressing Towards Goal  Note: Fall Risk Interventions:                                Problem: Patient Education: Go to Patient Education Activity  Goal: Patient/Family Education  Outcome: Progressing Towards Goal

## 2023-03-15 NOTE — PROGRESS NOTES
Nephrology Consult    Patient: Norbert Silva MRN: [id number]  SSN: [id number]    YOB: 1940  Age: 80 y.o. Sex: female      Subjective: The patient is seen in the room  She is afebrile  Noticed low blood pressure  Sodium 131  Creatinine 0.93      Past Medical History:   Diagnosis Date    Chronic obstructive pulmonary disease ([facility]     Hypertension     Lung cancer ([facility]      History reviewed. No pertinent surgical history. No family history on file.   Social History     Tobacco Use    Smoking status: Never    Smokeless tobacco: Never   Substance Use Topics    Alcohol use: Not Currently      Current Facility-Administered Medications   Medication Dose Route Frequency Provider Last Rate Last Admin    [START ON 3/16/2023] methylPREDNISolone (PF) (SOLU-MEDROL) injection 40 mg  40 mg IntraVENous DAILY Sapphire Rodriguez NP        diphenhydrAMINE (BENADRYL) capsule 25 mg  25 mg Oral QHS PRN Sapphire Rodriguez NP        urea (URE-NA) 15 gram packet 1 Packet  1 Packet Oral BID Katelyn Lucas MD   1 Packet at 03/15/23 1139    apixaban (ELIQUIS) tablet 2.5 mg  2.5 mg Oral BID Anamaria Otero PA-C   2.5 mg at 03/15/23 1009    lisinopriL (PRINIVIL, ZESTRIL) tablet 5 mg  5 mg Oral DAILY Anamaria Otero PA-C   5 mg at 03/15/23 1009    metoprolol tartrate (LOPRESSOR) tablet 25 mg  25 mg Oral BID Anamaria Otero PA-C   25 mg at 03/15/23 1009    benzonatate (TESSALON) capsule 100 mg  100 mg Oral TID PRN Fran Kay PA-C        ascorbic acid (vitamin C) (VITAMIN C) tablet 500 mg  500 mg Oral BID Anamaria Otero PA-C   500 mg at 03/15/23 1009    sodium chloride (NS) flush 5-40 mL  5-40 mL IntraVENous Q8H Anamaria Otero PA-C   10 mL at 03/13/23 2115    sodium chloride (NS) flush 5-40 mL  5-40 mL IntraVENous PRN Anamaria Otero PA-C        acetaminophen (TYLENOL) tablet 650 mg  650 mg Oral Q6H PRN Anamaria Otero PA-C        Or    acetaminophen (TYLENOL) suppository 650 mg  650 mg Rectal Q6H PRN Liz Escobar PA-C        polyethylene glycol (MIRALAX) packet 17 g  17 g Oral DAILY PRN Anamaria Otero PA-C        ondansetron (ZOFRAN ODT) tablet 4 mg  4 mg Oral Q8H PRN Anamaria Otero PA-C        Or    ondansetron (ZOFRAN) injection 4 mg  4 mg IntraVENous Q6H PRN Anamaria Otero PA-C        azithromycin (ZITHROMAX) 500 mg in 0.9% sodium chloride 250 mL ([id number])  500 mg IntraVENous Q24H Gualberto Flores  mL/hr at 03/15/23 1139 500 mg at 03/15/23 1139    zinc sulfate (ZINCATE) 50 mg zinc (220 mg) capsule 1 Capsule  1 Capsule Oral DAILY Radha Silva MD   1 Capsule at 03/15/23 1009    guaiFENesin ER (MUCINEX) tablet 600 mg  600 mg Oral Q12H Erica Cedillo MD   600 mg at 03/15/23 1009    albuterol (PROVENTIL HFA, VENTOLIN HFA, PROAIR HFA) inhaler 2 Puff  2 Puff Inhalation Q4H PRN Erica Cedillo MD            No Known Allergies    Review of Systems:  A comprehensive review of systems was negative except for that written in the History of Present Illness. Objective:     Vitals:    03/14/23 1609 03/14/23 2007 03/15/23 0749 03/15/23 1415   BP: 113/69 125/72 121/63 (!) 104/57   Pulse: 80 85 86 98   Resp: 18 18 18 18   Temp: 97.9 °F (36.6 °C) 97.7 °F (36.5 °C) 97.7 °F (36.5 °C) 97.6 °F (36.4 °C)   SpO2: 95% 98% 99% 98%   Weight:       Height:            Physical Exam:  General: NAD  Eyes: sclera anicteric  Oral Cavity: No thrush or ulcers  Neck: no JVD  Chest: Fair bilateral air entry  Heart: normal sounds  Abdomen: soft and non tender   : no peters  Lower Extremities: no edema  Skin: no rash  Neuro: intact  Psychiatric: non-depressed            Assessment:     Hospital Problems  Never Reviewed            Codes Class Noted POA    [facility] ICD-10-CM: C34.90  ICD-9-CM: 162.9  3/11/2023 Unknown        PNA (pneumonia) ICD-10-CM: J18.9  ICD-9-CM: 153  3/11/2023 Unknown           Plan:     #1 Hyponatremia.    -Of moderate severity.     -Acute on chronic.  -looks euvolemic  -Etiology can be underlying SIADH in the setting of lung cancer.  -On 3/11 sodium was 126 did improve 131 and then dropped again to 128.  -Sodium has improved to 131 today  -Pending urine sodium and urine osmolality. -on free water restriction 1200 cc per 24 hours. -cont  Urea 15 gm bid.  -I will consider giving tolvaptan in the setting of worsening hyponatremia.  -Not on thiazides or SSRIs.  -She is not symptomatic. #2 acute hypoxic respiratory failure.  -Possibly pneumonia/COVID infection/history of lung cancer  -She came with shortness of breath and sats in 88%  -On nasal cannula  -CTA chest small pleural effusion left-sided and otherwise no significant infiltrates  -COVID test positive on 3/4  -On Zithromax and Solu-Medrol  -Pulmonology is on board. #3 hypertension.  -Stable blood pressures  -Continue lisinopril 5 mg and metoprolol 25 mg twice a day. #4 history of CHF:  -Per echocardiogram last LVEF 50 to 55%  -She looks clinically compensated  -No evidence of fluid overload  -No need for IV Lasix that it can further lower her sodium. #5 history of DVT. On Eliquis    #6 history of lung cancer.   Adenocarcinoma of the lung diagnosed in August 2022  On chemotherapy        Signed By: Abbe Little MD     March 15, 2023

## 2023-03-15 NOTE — PROGRESS NOTES
Patient slept during shift and remained in room to maintain isolation precautions. Patient negative for falls at this time.

## 2023-03-16 ENCOUNTER — APPOINTMENT (OUTPATIENT)
Dept: GENERAL RADIOLOGY | Age: 83
DRG: 177 | End: 2023-03-16
Attending: INTERNAL MEDICINE
Payer: MEDICARE

## 2023-03-16 VITALS
HEART RATE: 99 BPM | HEIGHT: 63 IN | DIASTOLIC BLOOD PRESSURE: 69 MMHG | RESPIRATION RATE: 18 BRPM | OXYGEN SATURATION: 97 % | TEMPERATURE: 97.9 F | SYSTOLIC BLOOD PRESSURE: 130 MMHG | WEIGHT: 108 LBS | BODY MASS INDEX: 19.14 KG/M2

## 2023-03-16 PROBLEM — E87.1 HYPONATREMIA: Status: ACTIVE | Noted: 2023-03-16

## 2023-03-16 LAB
ALBUMIN SERPL-MCNC: 3 G/DL (ref 3.5–5)
ALBUMIN/GLOB SERPL: 0.8 (ref 1.1–2.2)
ALP SERPL-CCNC: 55 U/L (ref 45–117)
ALT SERPL-CCNC: 29 U/L (ref 12–78)
ANION GAP SERPL CALC-SCNC: 1 MMOL/L (ref 5–15)
AST SERPL W P-5'-P-CCNC: 12 U/L (ref 15–37)
BASOPHILS # BLD: 0 K/UL (ref 0–0.1)
BASOPHILS NFR BLD: 0 % (ref 0–1)
BILIRUB SERPL-MCNC: 0.7 MG/DL (ref 0.2–1)
BUN SERPL-MCNC: 49 MG/DL (ref 6–20)
BUN/CREAT SERPL: 59 (ref 12–20)
CA-I BLD-MCNC: 9.2 MG/DL (ref 8.5–10.1)
CHLORIDE SERPL-SCNC: 106 MMOL/L (ref 97–108)
CO2 SERPL-SCNC: 28 MMOL/L (ref 21–32)
CREAT SERPL-MCNC: 0.83 MG/DL (ref 0.55–1.02)
DIFFERENTIAL METHOD BLD: ABNORMAL
EOSINOPHIL # BLD: 0.3 K/UL (ref 0–0.4)
EOSINOPHIL NFR BLD: 3 % (ref 0–7)
ERYTHROCYTE [DISTWIDTH] IN BLOOD BY AUTOMATED COUNT: 14.8 % (ref 11.5–14.5)
GLOBULIN SER CALC-MCNC: 4 G/DL (ref 2–4)
GLUCOSE SERPL-MCNC: 86 MG/DL (ref 65–100)
HCT VFR BLD AUTO: 41.8 % (ref 35–47)
HGB BLD-MCNC: 13.9 G/DL (ref 11.5–16)
IMM GRANULOCYTES # BLD AUTO: 0.1 K/UL (ref 0–0.04)
IMM GRANULOCYTES NFR BLD AUTO: 1 % (ref 0–0.5)
LYMPHOCYTES # BLD: 1.3 K/UL (ref 0.8–3.5)
LYMPHOCYTES NFR BLD: 13 % (ref 12–49)
MCH RBC QN AUTO: 29.2 PG (ref 26–34)
MCHC RBC AUTO-ENTMCNC: 33.3 G/DL (ref 30–36.5)
MCV RBC AUTO: 87.8 FL (ref 80–99)
MONOCYTES # BLD: 0.9 K/UL (ref 0–1)
MONOCYTES NFR BLD: 9 % (ref 5–13)
NEUTS SEG # BLD: 7.5 K/UL (ref 1.8–8)
NEUTS SEG NFR BLD: 74 % (ref 32–75)
NRBC # BLD: 0 K/UL (ref 0–0.01)
NRBC BLD-RTO: 0 PER 100 WBC
OSMOLALITY UR: 586 MOSM/KG H2O
PLATELET # BLD AUTO: 337 K/UL (ref 150–400)
PMV BLD AUTO: 10.4 FL (ref 8.9–12.9)
POTASSIUM SERPL-SCNC: 3.7 MMOL/L (ref 3.5–5.1)
PROT SERPL-MCNC: 7 G/DL (ref 6.4–8.2)
RBC # BLD AUTO: 4.76 M/UL (ref 3.8–5.2)
SODIUM SERPL-SCNC: 135 MMOL/L (ref 136–145)
SODIUM UR-SCNC: 44 MMOL/L
WBC # BLD AUTO: 10.2 K/UL (ref 3.6–11)

## 2023-03-16 PROCEDURE — 74011250637 HC RX REV CODE- 250/637: Performed by: PHYSICIAN ASSISTANT

## 2023-03-16 PROCEDURE — 80053 COMPREHEN METABOLIC PANEL: CPT

## 2023-03-16 PROCEDURE — 74011250637 HC RX REV CODE- 250/637: Performed by: INTERNAL MEDICINE

## 2023-03-16 PROCEDURE — 85025 COMPLETE CBC W/AUTO DIFF WBC: CPT

## 2023-03-16 PROCEDURE — 36415 COLL VENOUS BLD VENIPUNCTURE: CPT

## 2023-03-16 PROCEDURE — 74011000250 HC RX REV CODE- 250: Performed by: PHYSICIAN ASSISTANT

## 2023-03-16 PROCEDURE — 74011250636 HC RX REV CODE- 250/636: Performed by: NURSE PRACTITIONER

## 2023-03-16 PROCEDURE — 71045 X-RAY EXAM CHEST 1 VIEW: CPT

## 2023-03-16 RX ORDER — METOPROLOL TARTRATE 25 MG/1
12.5 TABLET, FILM COATED ORAL 2 TIMES DAILY
Status: DISCONTINUED | OUTPATIENT
Start: 2023-03-16 | End: 2023-03-16 | Stop reason: HOSPADM

## 2023-03-16 RX ORDER — BENZONATATE 100 MG/1
100 CAPSULE ORAL
Qty: 21 CAPSULE | Refills: 0 | Status: SHIPPED | OUTPATIENT
Start: 2023-03-16 | End: 2023-03-23

## 2023-03-16 RX ORDER — GUAIFENESIN 600 MG/1
600 TABLET, EXTENDED RELEASE ORAL EVERY 12 HOURS
Qty: 14 TABLET | Refills: 0 | Status: SHIPPED | OUTPATIENT
Start: 2023-03-16

## 2023-03-16 RX ORDER — PREDNISONE 10 MG/1
TABLET ORAL
Qty: 15 TABLET | Refills: 0 | Status: SHIPPED | OUTPATIENT
Start: 2023-03-16 | End: 2023-03-23

## 2023-03-16 RX ORDER — AZITHROMYCIN 500 MG/1
500 TABLET, FILM COATED ORAL DAILY
Qty: 5 TABLET | Refills: 0 | Status: SHIPPED | OUTPATIENT
Start: 2023-03-16 | End: 2023-03-21

## 2023-03-16 RX ADMIN — GUAIFENESIN 600 MG: 600 TABLET, EXTENDED RELEASE ORAL at 10:32

## 2023-03-16 RX ADMIN — SODIUM CHLORIDE, PRESERVATIVE FREE 10 ML: 5 INJECTION INTRAVENOUS at 05:19

## 2023-03-16 RX ADMIN — LISINOPRIL 5 MG: 5 TABLET ORAL at 10:32

## 2023-03-16 RX ADMIN — BENZONATATE 100 MG: 100 CAPSULE ORAL at 17:09

## 2023-03-16 RX ADMIN — METHYLPREDNISOLONE SODIUM SUCCINATE 40 MG: 40 INJECTION, POWDER, FOR SOLUTION INTRAMUSCULAR; INTRAVENOUS at 10:32

## 2023-03-16 RX ADMIN — OXYCODONE HYDROCHLORIDE AND ACETAMINOPHEN 500 MG: 500 TABLET ORAL at 10:32

## 2023-03-16 RX ADMIN — APIXABAN 2.5 MG: 2.5 TABLET, FILM COATED ORAL at 10:32

## 2023-03-16 RX ADMIN — ZINC SULFATE 220 MG (50 MG) CAPSULE 1 CAPSULE: CAPSULE at 10:32

## 2023-03-16 RX ADMIN — Medication 1 PACKET: at 10:32

## 2023-03-16 NOTE — PROGRESS NOTES
Problem: Isolation Precautions - Risk of Spread of Infection  Goal: Prevent transmission of infectious organism to others  Outcome: Progressing Towards Goal     Problem: Falls - Risk of  Goal: *Absence of Falls  Description: Document Cayden Phillip Fall Risk and appropriate interventions in the flowsheet.   Outcome: Progressing Towards Goal  Note: Fall Risk Interventions:

## 2023-03-16 NOTE — PROGRESS NOTES
Nephrology Consult    Patient: Simin Dawson MRN: [id number]  SSN: [id number]    YOB: 1940  Age: 80 y.o. Sex: female      Subjective: The patient is seen in the room  She is afebrile  Noticed low blood pressure  Sodium 135 (improving)  Creatinine 0.93      Past Medical History:   Diagnosis Date    Chronic obstructive pulmonary disease ([facility] [address]     Hypertension     Lung cancer ([facility] [address]      History reviewed. No pertinent surgical history. No family history on file.   Social History     Tobacco Use    Smoking status: Never    Smokeless tobacco: Never   Substance Use Topics    Alcohol use: Not Currently      Current Facility-Administered Medications   Medication Dose Route Frequency Provider Last Rate Last Admin    methylPREDNISolone (PF) (SOLU-MEDROL) injection 40 mg  40 mg IntraVENous DAILY Darnell Roberts NP   40 mg at 03/16/23 1032    diphenhydrAMINE (BENADRYL) capsule 25 mg  25 mg Oral QHS PRN Darnell Roberts NP        urea (URE-NA) 15 gram packet 1 Packet  1 Packet Oral BID Jayashree Mcdonald MD   1 Packet at 03/16/23 1032    apixaban (ELIQUIS) tablet 2.5 mg  2.5 mg Oral BID Anamaria Otero PA-C   2.5 mg at 03/16/23 1032    lisinopriL (PRINIVIL, ZESTRIL) tablet 5 mg  5 mg Oral DAILY Anamaria Otero PA-C   5 mg at 03/16/23 1032    metoprolol tartrate (LOPRESSOR) tablet 25 mg  25 mg Oral BID Anamaria Otero PA-C   25 mg at 03/15/23 2046    benzonatate (TESSALON) capsule 100 mg  100 mg Oral TID PRN Tomi Lowe PA-C        ascorbic acid (vitamin C) (VITAMIN C) tablet 500 mg  500 mg Oral BID Anamaria Otero PA-C   500 mg at 03/16/23 1032    sodium chloride (NS) flush 5-40 mL  5-40 mL IntraVENous Q8H Anamaria Otero PA-C   10 mL at 03/16/23 0519    sodium chloride (NS) flush 5-40 mL  5-40 mL IntraVENous PRN Anamaria Otero PA-C        acetaminophen (TYLENOL) tablet 650 mg  650 mg Oral Q6H PRN Anamaria Otero PA-C        Or    acetaminophen (TYLENOL) suppository 650 mg  650 mg Rectal Q6H PRN Anamaria Otero PA-C        polyethylene glycol (MIRALAX) packet 17 g  17 g Oral DAILY PRN Anamaria Otero PA-C        ondansetron (ZOFRAN ODT) tablet 4 mg  4 mg Oral Q8H PRN Aanmaria Otero PA-C        Or    ondansetron (ZOFRAN) injection 4 mg  4 mg IntraVENous Q6H PRN Anamaria Otero PA-C        azithromycin (ZITHROMAX) 500 mg in 0.9% sodium chloride 250 mL ([id number])  500 mg IntraVENous Q24H Gualberto Flores  mL/hr at 03/15/23 1139 500 mg at 03/15/23 1139    zinc sulfate (ZINCATE) 50 mg zinc (220 mg) capsule 1 Capsule  1 Capsule Oral DAILY Radha Silva MD   1 Capsule at 03/16/23 1032    guaiFENesin ER (MUCINEX) tablet 600 mg  600 mg Oral Q12H Anshu Sykes MD   600 mg at 03/16/23 1032    albuterol (PROVENTIL HFA, VENTOLIN HFA, PROAIR HFA) inhaler 2 Puff  2 Puff Inhalation Q4H PRN Anshu Sykes MD            No Known Allergies    Review of Systems:  A comprehensive review of systems was negative except for that written in the History of Present Illness. Objective:     Vitals:    03/15/23 2024 03/16/23 0409 03/16/23 0852 03/16/23 1032   BP: 118/64 128/68 130/68 (!) 103/55   Pulse: 73 72 82 92   Resp: 20 18 20    Temp: 98.1 °F (36.7 °C) 97.7 °F (36.5 °C) 98 °F (36.7 °C)    SpO2: 96% 97% 98%    Weight:       Height:            Physical Exam:  General: NAD  Eyes: sclera anicteric  Oral Cavity: No thrush or ulcers  Neck: no JVD  Chest: Fair bilateral air entry  Heart: normal sounds  Abdomen: soft and non tender   : no peters  Lower Extremities: no edema  Skin: no rash  Neuro: intact  Psychiatric: non-depressed            Assessment:     Hospital Problems  Never Reviewed            Codes Class Noted POA    [facility] ICD-10-CM: C34.90  ICD-9-CM: 162.9  3/11/2023 Unknown        PNA (pneumonia) ICD-10-CM: J18.9  ICD-9-CM: 039  3/11/2023 Unknown           Plan:     #1 Hyponatremia.    -Of moderate severity.     -Acute on chronic.  -looks euvolemic  -Etiology can be underlying SIADH in the setting of lung cancer.  -On 3/11 sodium was 126 did improve 131 and then dropped again to 128.  -Sodium has improved to 135 today  -urine sodium 44 and pending urine osmolality. -on free water restriction 1200 cc per 24 hours. -cont  Urea 15 gm bid.  -Not on thiazides or SSRIs.  -She is not symptomatic. #2 acute hypoxic respiratory failure.  -Possibly pneumonia/COVID infection/history of lung cancer  -She came with shortness of breath and sats in 88%  -On nasal cannula  -CTA chest small pleural effusion left-sided and otherwise no significant infiltrates  -COVID test positive on 3/4  -On Zithromax and Solu-Medrol  -Pulmonology is on board. #3 hypertension.  -Stable blood pressures  -Continue lisinopril 5 mg and metoprolol 25 mg twice a day. #4 history of CHF:  -Per echocardiogram last LVEF 50 to 55%  -She looks clinically compensated  -No evidence of fluid overload  -No need for IV Lasix that it can further lower her sodium. #5 history of DVT. On Eliquis    #6 history of lung cancer.   Adenocarcinoma of the lung diagnosed in August 2022  On chemotherapy        Signed By: Eileen Morales MD     March 16, 2023

## 2023-03-16 NOTE — PROGRESS NOTES
Spoke with Pulmonary Disease physician to inform results of chest xray. Per pulmonary physician patient is cleared for discharge.

## 2023-03-16 NOTE — PROGRESS NOTES
Pulmonary Progress Note      NAME: Maribel Shaw   :  1940  MRM:  [id number]    Date/Time: 3/16/2023  6:23 PM         Subjective:     Patient seen and examined  Overnight events noted    Sitting up in the chair comfortably  On room air  No acute distress  Family at bedside    Chest x-ray 3/16 reviewed personally and showed stable infiltrate/effusion  No acute changes    Past Medical History reviewed and unchanged from Admission History and Physical       Objective:     Physical Exam     Vitals:      Last 24hrs VS reviewed since prior progress note. Most recent are:    Visit Vitals  BP (!) 103/55   Pulse 92   Temp 98 °F (36.7 °C)   Resp 20   Ht 5' 3\" (1.6 m)   Wt 49 kg (108 lb)   SpO2 98%   BMI 19.13 kg/m²     SpO2 Readings from Last 6 Encounters:   23 98%   22 98%   22 95%          Intake/Output Summary (Last 24 hours) at 3/16/2023 1616  Last data filed at 3/16/2023 4759  Gross per 24 hour   Intake 240 ml   Output 3 ml   Net 237 ml          Physical Exam:  Patient is a elderly white female who is on room air. Not in any distress. Vital signs as above. HEENT examination show pupils are equal and reactive to light. No significant pallor. No icterus. Nasal passages are patent. Oropharynx is without thrush. Neck is supple and trachea central.  No JVD or lymphadenopathy. She is not using accessory muscles of respiration. Chest is symmetrical with equal and fair air entry bilaterally. She is diminished breath sounds over the left lung base with some crackles. Occasional scattered wheezes,  Improved. No chest wall tenderness. Rhythm is regular without any loud murmur or gallop. Sinus tachycardia intermittently is noted on the monitor. Abdomen is obese and benign. Bowel sounds audible. No masses or organomegaly. Extremities do not show any cyanosis or clubbing. No significant pitting edema. Pulses are palpable. Neurologic system examination is grossly intact.   Skin is warm and dry. XR CHEST PORT   Final Result   Left pleural effusion and basilar atelectasis without significant change. CTA CHEST W OR W WO CONT   Final Result   1. Small left pleural effusion. Debris or carcinomatosis in the left pleural   space. 2. Tight left lower lobe collapse. Narrowing of the left pulmonary arteries. XR CHEST PORT   Final Result   Interval increase in size of small left pleural effusion and left   basilar consolidation.               Lab Data Reviewed: (see below)      Medications:  Current Facility-Administered Medications   Medication Dose Route Frequency    metoprolol tartrate (LOPRESSOR) tablet 12.5 mg  12.5 mg Oral BID    methylPREDNISolone (PF) (SOLU-MEDROL) injection 40 mg  40 mg IntraVENous DAILY    diphenhydrAMINE (BENADRYL) capsule 25 mg  25 mg Oral QHS PRN    urea (URE-NA) 15 gram packet 1 Packet  1 Packet Oral BID    apixaban (ELIQUIS) tablet 2.5 mg  2.5 mg Oral BID    lisinopriL (PRINIVIL, ZESTRIL) tablet 5 mg  5 mg Oral DAILY    benzonatate (TESSALON) capsule 100 mg  100 mg Oral TID PRN    ascorbic acid (vitamin C) (VITAMIN C) tablet 500 mg  500 mg Oral BID    sodium chloride (NS) flush 5-40 mL  5-40 mL IntraVENous Q8H    sodium chloride (NS) flush 5-40 mL  5-40 mL IntraVENous PRN    acetaminophen (TYLENOL) tablet 650 mg  650 mg Oral Q6H PRN    Or    acetaminophen (TYLENOL) suppository 650 mg  650 mg Rectal Q6H PRN    polyethylene glycol (MIRALAX) packet 17 g  17 g Oral DAILY PRN    ondansetron (ZOFRAN ODT) tablet 4 mg  4 mg Oral Q8H PRN    Or    ondansetron (ZOFRAN) injection 4 mg  4 mg IntraVENous Q6H PRN    azithromycin (ZITHROMAX) 500 mg in 0.9% sodium chloride 250 mL ([id number])  500 mg IntraVENous Q24H    zinc sulfate (ZINCATE) 50 mg zinc (220 mg) capsule 1 Capsule  1 Capsule Oral DAILY    guaiFENesin ER (MUCINEX) tablet 600 mg  600 mg Oral Q12H    albuterol (PROVENTIL HFA, VENTOLIN HFA, PROAIR HFA) inhaler 2 Puff  2 Puff Inhalation Q4H PRN ______________________________________________________________________      Lab Review:     Recent Labs     03/16/23  0731 03/15/23  0728 03/14/23  0712   WBC 10.2 11.1* 11.7*   HGB 13.9 14.3 12.4   HCT 41.8 43.1 36.7    336 319       Recent Labs     03/16/23  0731 03/15/23  0728 03/14/23  0712   * 131* 128*   K 3.7 3.9 4.4    102 99   CO2 28 24 24   GLU 86 92 129*   BUN 49* 47* 36*   CREA 0.83 0.93 1.03*   CA 9.2 9.0 8.6   ALB 3.0* 3.4* 3.5   ALT 29 43 39       No components found for: GLPOC  No results for input(s): PH, PCO2, PO2, HCO3, FIO2 in the last 72 hours. No results for input(s): INR, INREXT, INREXT, INREXT in the last 72 hours. Other pertinent lab:   Laboratory data on admit:  Portable chest x-ray done 3/11 shows slight increase in the left pleural effusion as compared to a prior study of January 2022. CTA of the chest was obtained 3/11/23. No pulmonary embolism is seen, personally reviewed. However the report does not mention specifically the absence of pulmonary embolism. There is tight collapse of the left lower lobe described. According to the report the left lower lobe pulmonary artery is narrowed. There is a small left pleural effusion with possible debris in it. She has chronic left-sided rib fractures. There are no comparison. There is mild mosaic attenuation of the right lung. Electrolytes are within normal limits. BUN is 16 creatinine is 1.81. Blood glucose of 94. WBC count 12.2 with neutrophils of 82% hemoglobin is 11.1 and a platelet count of 207. Lactic acid 1.0 troponin 12. BNP is 1/2/2005. Rapid COVID test is positive. Assessment & Plan:      1. The patient has COVID-19. CT of the chest does not show any focal pneumonia. There is some debris noted in the lingular bronchus. There are changes of chronic left pleural effusion with left lower lobe atelectasis/collapse which is related to her history of lung cancer.   She recently completed a course of Augmentin. Agree with starting her on azithromycin for atypical coverage and IV Solu-Medrol. I added guaifenesin. Continue with albuterol inhaler on a as needed basis. She is started on ascorbic acid and zinc.  She is currently on room air. She does not need any specific treatment for COVID-19. She has been fully vaccinated. 2.  History of lung cancer. Status posttreatment but it appears to be recurrent. She had a PET scan done on 2/28/2023 at [facility] which showed partial response. Input from oncology appreciated. She is on second line treatment with weekly Taxotere and has been tolerating it well. 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 3.  History of DVT. She is on Eliquis 2.5 mg twice daily. 4.  History of cardiomyopathy with a EF of 25% according to the daughter. Echocardiogram would be helpful. Patient stable for discharge from pulmonary standpoint  Will go on antibiotics  Will follow up with Dr. Pichardo Cancer in outpatient clinic in 3 to 4 weeks     Questions of patient and family were answered at bedside in detail  Case discussed in detail with RN, RT, and care team  Thank you for involving me in the care of this patient  I will follow with you closely during hospitalization    Time spent more than 30 minutes under patient care with no overlap reviewing results and records, decision making, and answering questions.     Maxine Melendez MD  Pulmonary/CC

## 2023-03-16 NOTE — DISCHARGE SUMMARY
Hospitalist Discharge Summary     Patient ID:    Idania Beaver  [id number]  43 y.o.  1940    Admit date: 3/11/2023    Discharge date : 3/16/2023      Final Diagnoses: Active Problems:    Lung cancer ([address] (3/11/2023)      PNA (pneumonia) (3/11/2023)      Hyponatremia (3/16/2023)        Reason for Hospitalization/Hospital Course:   Idania Beaver is a 80 y.o. female with a history of COPD, lung cancer, hypothyroidism that presented to the emergency room on 3/11/2023 for 1 week history of worsening shortness of breath. Patient's pulmonologist is Dr. Perry Mccrary. Oncologist is Dr. Tiffani Segundo. Patient tested positive for COVID on 3/4/2023. Alysia Cruz She called her primary care and was given Augmentin p.o. however per daughter at bedside says that over the course the last week she has had worsening shortness of breath, wheezing, nonproductive cough. In the ED patient was found to be acutely hypoxic with an oxygen saturation of 88. She was placed on oxygen nasal cannula. Then weaned off quickly. Chest x-ray showed interval increase in size of small left pleural effusion and left basilar consolidation. CTA of the chest showed small left pleural effusion, debris or carcinomatosis in the left pleural space, trace left lower lobe collapse, narrowing of the left pulmonary arteries. Laboratory data was significant for a BNP of 1205. Troponin x1 negative. Sodium 126. WBC 12.2. Rapid COVID is pending. Was requested admission for hyponatremia, COVID-positive, increasing shortness of breath. Started on fluid restriction and IV Lasix 40 mg daily. Also started on IV Solu-Medrol and IV azithromycin. Tessalon as needed for cough. Pulmonology and oncology consulted. Portable chest x-ray done 3/11 shows slight increase in the left pleural effusion as compared to a prior study of January 2022. CTA of the chest was obtained 3/11/23. IMPRESSION  1. Small left pleural effusion.  Debris or carcinomatosis in the left pleural  space. 2. Tight left lower lobe collapse. Narrowing of the left pulmonary arteries. 3/14: Sodium level 128 this morning. Nephrology consult pending  315 sodium level improved to 131 this am. She is on fluid restriction. Family at bedside. Updated with plan of care. She was started on urea per Nephrology. 3/16: Repeat chest x-ray this morning reviewed by Dr. Lyubov Nathan reports patient is clear for discharge with follow up in 2-3 weeks. Patient's daughter states they have an appointment with Dr. Elmore Like the end of the month. Spoke with Neprhology who states Ms. Owen Arguello may be discharged on the urea with follow up in the office. Discharge plan reviewed with patient and daughter at the bedside. Chest x-ray 3/16: IMPRESSION  Left pleural effusion and basilar atelectasis without significant change. Discharge Medications:   Current Discharge Medication List        START taking these medications    Details   benzonatate (TESSALON) 100 mg capsule Take 1 Capsule by mouth three (3) times daily as needed for Cough for up to 7 days. Qty: 21 Capsule, Refills: 0  Start date: 3/16/2023, End date: 3/23/2023      guaiFENesin ER (MUCINEX) 600 mg ER tablet Take 1 Tablet by mouth every twelve (12) hours. Qty: 14 Tablet, Refills: 0  Start date: 3/16/2023      predniSONE (DELTASONE) 10 mg tablet Take 30 mg by mouth daily (with breakfast) for 3 days, THEN 20 mg daily (with breakfast) for 2 days, THEN 10 mg daily (with breakfast) for 2 days. Qty: 15 Tablet, Refills: 0  Start date: 3/16/2023, End date: 3/23/2023      azithromycin (ZITHROMAX) 500 mg tab Take 1 Tablet by mouth daily for 5 days. Qty: 5 Tablet, Refills: 0  Start date: 3/16/2023, End date: 3/21/2023      urea (URE-NA) 15 gram packet Take 1 Packet by mouth two (2) times a day.   Qty: 60 Packet, Refills: 1  Start date: 3/16/2023           CONTINUE these medications which have NOT CHANGED    Details   levothyroxine (Synthroid) 88 mcg tablet Take  by mouth Daily (before breakfast). Indications: a condition with low thyroid hormone levels      potassium chloride SR (KLOR-CON 10) 10 mEq tablet Take 20 mEq by mouth daily. Indications: low amount of potassium in the blood      cholecalciferol, vitamin D3, (Vitamin D3) 50 mcg (2,000 unit) tab Take  by mouth. folic acid (FOLVITE) 1 mg tablet Take 1 mg by mouth daily. ferrous sulfate (Slow Fe) 142 mg (45 mg iron) ER tablet Take  by mouth Daily (before breakfast). metoprolol tartrate (LOPRESSOR) 25 mg tablet Take 25 Tablets by mouth two (2) times a day. apixaban (ELIQUIS) 2.5 mg tablet Take 2.5 mg by mouth two (2) times a day. albuterol-ipratropium (DUO-NEB) 2.5 mg-0.5 mg/3 ml nebu 3 mL by Nebulization route every four (4) hours as needed for Wheezing. Qty: 30 Nebule, Refills: 0      Nebulizer & Compressor machine 1 Each by Does Not Apply route four (4) times daily as needed for Wheezing or Shortness of Breath. Qty: 1 Each, Refills: 0      lisinopriL (PRINIVIL, ZESTRIL) 5 mg tablet Take 5 mg by mouth daily. Indications: high blood pressure               Follow up Care:    1. Siria Bhatti MD in 1-2 weeks. Follow-up Information       Follow up With Specialties Details Why Contact Info    Saint Pickett, DO Pulmonary Critical Care Follow up As Scheduled [address] [place] [address]  [phone]      Karen Cain MD Nephrology Follow up in 2 week(s) [facility] follow up [address] [facility] [place], [place], [address]  [phone]                Patient Follow Up Instructions:    Activity: Activity as tolerated  Diet:  Regular Diet  Wound Care: None needed     Condition at Discharge:  Stable  __________________________________________________________________    Disposition  Home or Self Care  ____________________________________________________________________    Code Status:  Full Code  ___________________________________________________________________    Discharge Exam:  Patient seen and examined by me on discharge day. Pertinent Findings:  Gen:    Not in distress  Chest: Clear lungs  CVS:   Regular rhythm. No edema  Abd:  Soft, not distended, not tender  Neuro:  Alert        CONSULTATIONS: Pulmonary/Intensive care and Nephrology    Significant Diagnostic Studies:   Recent Results (from the past 24 hour(s))   SODIUM, UR, RANDOM    Collection Time: 03/15/23 10:50 PM   Result Value Ref Range    Sodium,urine random 44 mmol/L   CBC WITH AUTOMATED DIFF    Collection Time: 03/16/23  7:31 AM   Result Value Ref Range    WBC 10.2 3.6 - 11.0 K/uL    RBC 4.76 3.80 - 5.20 M/uL    HGB 13.9 11.5 - 16.0 g/dL    HCT 41.8 35.0 - 47.0 %    MCV 87.8 80.0 - 99.0 FL    MCH 29.2 26.0 - 34.0 PG    MCHC 33.3 30.0 - 36.5 g/dL    RDW 14.8 (H) 11.5 - 14.5 %    PLATELET 639 292 - 806 K/uL    MPV 10.4 8.9 - 12.9 FL    NRBC 0.0 0.0  WBC    ABSOLUTE NRBC 0.00 0.00 - 0.01 K/uL    NEUTROPHILS 74 32 - 75 %    LYMPHOCYTES 13 12 - 49 %    MONOCYTES 9 5 - 13 %    EOSINOPHILS 3 0 - 7 %    BASOPHILS 0 0 - 1 %    IMMATURE GRANULOCYTES 1 (H) 0 - 0.5 %    ABS. NEUTROPHILS 7.5 1.8 - 8.0 K/UL    ABS. LYMPHOCYTES 1.3 0.8 - 3.5 K/UL    ABS. MONOCYTES 0.9 0.0 - 1.0 K/UL    ABS. EOSINOPHILS 0.3 0.0 - 0.4 K/UL    ABS. BASOPHILS 0.0 0.0 - 0.1 K/UL    ABS. IMM.  GRANS. 0.1 (H) 0.00 - 0.04 K/UL    DF AUTOMATED     METABOLIC PANEL, COMPREHENSIVE    Collection Time: 03/16/23  7:31 AM   Result Value Ref Range    Sodium 135 (L) 136 - 145 mmol/L    Potassium 3.7 3.5 - 5.1 mmol/L    Chloride 106 97 - 108 mmol/L    CO2 28 21 - 32 mmol/L    Anion gap 1 (L) 5 - 15 mmol/L    Glucose 86 65 - 100 mg/dL    BUN 49 (H) 6 - 20 mg/dL    Creatinine 0.83 0.55 - 1.02 mg/dL    BUN/Creatinine ratio 59 (H) 12 - 20      eGFR >60 >60 ml/min/1.73m2    Calcium 9.2 8.5 - 10.1 mg/dL    Bilirubin, total 0.7 0.2 - 1.0 mg/dL    AST (SGOT) 12 (L) 15 - 37 U/L    ALT (SGPT) 29 12 - 78 U/L    Alk. phosphatase 55 45 - 117 U/L    Protein, total 7.0 6.4 - 8.2 g/dL    Albumin 3.0 (L) 3.5 - 5.0 g/dL    Globulin 4.0 2.0 - 4.0 g/dL    A-G Ratio 0.8 (L) 1.1 - 2.2       XR CHEST PORT   Final Result   Left pleural effusion and basilar atelectasis without significant change. CTA CHEST W OR W WO CONT   Final Result   1. Small left pleural effusion. Debris or carcinomatosis in the left pleural   space. 2. Tight left lower lobe collapse. Narrowing of the left pulmonary arteries. XR CHEST PORT   Final Result   Interval increase in size of small left pleural effusion and left   basilar consolidation.               Time spent in direct and indirect care including coordination of services: Greater than 35 minutes    Signed:  Len Quiroga NP  3/16/2023  3:29 PM

## 2023-03-16 NOTE — PROGRESS NOTES
I have reviewed discharge instructions with the patient and parent. The patient and parent verbalized understanding. IV was removed with no complications, tip intact.  Patient was discharged from unit via wheelchair with daughter to transport home

## 2023-03-17 LAB
BACTERIA SPEC CULT: NORMAL
SPECIAL REQUESTS,SREQ: NORMAL

## 2023-03-24 ENCOUNTER — OFFICE VISIT (OUTPATIENT)
Dept: ENDOCRINOLOGY | Age: 83
End: 2023-03-24
Payer: MEDICARE

## 2023-03-24 VITALS
BODY MASS INDEX: 18.36 KG/M2 | WEIGHT: 103.6 LBS | OXYGEN SATURATION: 97 % | TEMPERATURE: 97.4 F | HEIGHT: 63 IN | HEART RATE: 75 BPM | DIASTOLIC BLOOD PRESSURE: 68 MMHG | SYSTOLIC BLOOD PRESSURE: 127 MMHG

## 2023-03-24 DIAGNOSIS — E87.1 HYPONATREMIA: ICD-10-CM

## 2023-03-24 DIAGNOSIS — E55.9 VITAMIN D DEFICIENCY: ICD-10-CM

## 2023-03-24 DIAGNOSIS — M81.0 AGE RELATED OSTEOPOROSIS, UNSPECIFIED PATHOLOGICAL FRACTURE PRESENCE: Primary | ICD-10-CM

## 2023-03-24 DIAGNOSIS — E03.9 ACQUIRED HYPOTHYROIDISM: ICD-10-CM

## 2023-03-24 PROCEDURE — 1111F DSCHRG MED/CURRENT MED MERGE: CPT | Performed by: INTERNAL MEDICINE

## 2023-03-24 PROCEDURE — 99215 OFFICE O/P EST HI 40 MIN: CPT | Performed by: INTERNAL MEDICINE

## 2023-03-24 PROCEDURE — G8510 SCR DEP NEG, NO PLAN REQD: HCPCS | Performed by: INTERNAL MEDICINE

## 2023-03-24 PROCEDURE — G8419 CALC BMI OUT NRM PARAM NOF/U: HCPCS | Performed by: INTERNAL MEDICINE

## 2023-03-24 PROCEDURE — G8536 NO DOC ELDER MAL SCRN: HCPCS | Performed by: INTERNAL MEDICINE

## 2023-03-24 PROCEDURE — G8427 DOCREV CUR MEDS BY ELIG CLIN: HCPCS | Performed by: INTERNAL MEDICINE

## 2023-03-24 PROCEDURE — 1090F PRES/ABSN URINE INCON ASSESS: CPT | Performed by: INTERNAL MEDICINE

## 2023-03-24 PROCEDURE — 1101F PT FALLS ASSESS-DOCD LE1/YR: CPT | Performed by: INTERNAL MEDICINE

## 2023-03-24 PROCEDURE — 1123F ACP DISCUSS/DSCN MKR DOCD: CPT | Performed by: INTERNAL MEDICINE

## 2023-03-24 RX ORDER — VIT C/E/ZN/COPPR/LUTEIN/ZEAXAN 250MG-90MG
1 CAPSULE ORAL 2 TIMES DAILY WITH MEALS
COMMUNITY

## 2023-03-24 RX ORDER — ALBUTEROL SULFATE 90 UG/1
AEROSOL, METERED RESPIRATORY (INHALATION)
COMMUNITY

## 2023-03-24 RX ORDER — DEXAMETHASONE 4 MG/1
TABLET ORAL
COMMUNITY

## 2023-03-24 RX ORDER — MULTIVITAMIN
1 TABLET ORAL DAILY
COMMUNITY

## 2023-03-24 RX ORDER — LEVOTHYROXINE SODIUM 88 UG/1
TABLET ORAL
Qty: 90 TABLET | Refills: 3 | Status: SHIPPED | OUTPATIENT
Start: 2023-03-24

## 2023-03-24 RX ORDER — PROCHLORPERAZINE MALEATE 5 MG
TABLET ORAL
COMMUNITY
End: 2023-03-24

## 2023-03-24 RX ORDER — ASPIRIN 81 MG/1
TABLET ORAL
COMMUNITY

## 2023-03-24 RX ORDER — ONDANSETRON HYDROCHLORIDE 8 MG/1
TABLET, FILM COATED ORAL
COMMUNITY

## 2023-03-24 NOTE — PROGRESS NOTES
History and Physical    Patient: Max Gamez MRN: [id number]  SSN: [id number]    YOB: 1940  Age: 80 y.o. Sex: female      Subjective:          Max Gamez is a 80 y.o. female with past medical history of lung cancer followed by  Dr. Raymond Sánchez, osteoporosis, has reclast  infusions - last given feb 2022 at [facility] , hypothyroidism     Pt has changed from Dr Alexia Sullivan , last march 2022   Pt saw Dr. Sherly Rojas before seeing Dr. Alexia Sullivan       Pt is being seen for hypothyroidism  and   Osteoporosis   She had reclast done jan 2021; jan 2022;  and feb 19 2023   Vit D  200 int units   and   caltrate      She got hospitalized for COVID     Daughter is here at the visit  and she provides history  and she  keeps up with mother's health and meds   She claims  synthroid id always taken fasting           March 2022     History was mainly obtained from patient's daughter. Patient is a poor historian because of memory issues. Hypothyroidism:  Currently she is on namebrand Synthroid 88 mcg 1 tablet every day. She has been on this dose for more than 1 year. She takes it regularly and appropriately. Appetite is good, bowel movements are regular, she denies any palpitations or tremors, sleep is okay. Weight has fluctuated recently but she has other health issues going on. Osteoporosis:  Last bone density scan was at [facility] 2 years back. A very long time back she was briefly treated with Fosamax which she did not continue. When she had bone density scan 2 years back, she was started on Reclast infusion. Received first Reclast infusion in January 2021, received second Reclast infusion in January 2022, this time it was ordered by patient's hematologist oncologist Dr. Raymond Sánchez. She takes calcium tablet twice a day, tries to consume 1-2 servings of dairy every day, takes vitamin D 2000 units every day. She has had a couple kidney stones in the past.  Never broken any bones.   She has not had bone density scan repeated since she went on Reclast.  Does not require steroids. Does not do any structured exercise. No issues with balance. Up-to-date with dental exam, goes every 6 months. Last appointment was in October 2021. She does not have any loose or bothersome teeth. Does not smoke cigarettes. Past Medical History:   Diagnosis Date    Chronic obstructive pulmonary disease ([facility]     Hypertension     Lung cancer ([facility]      History reviewed. No pertinent surgical history. History reviewed. No pertinent family history. Social History     Tobacco Use    Smoking status: Never    Smokeless tobacco: Never   Substance Use Topics    Alcohol use: Not Currently           No Known Allergies    Review of Systems:  ROS    A comprehensive review of systems was preformed and it is negative except mentioned in HPI    Objective:     Vitals:    03/24/23 1326   BP: 127/68   Pulse: 75   Temp: 97.4 °F (36.3 °C)   TempSrc: Temporal   SpO2: 97%   Weight: 103 lb 9.6 oz (47 kg)   Height: 5' 3\" (1.6 m)        Physical Exam:     Constitutional: She is oriented to person, place, and time. She appears well-developed and well-nourished. Neck:  Neck supple. No thyromegaly present. Cardiovascular: Normal rate, regular rhythm   Pulmonary/Chest: Breath sounds normal.   Psychiatric: She has a normal mood and affect. Labs and Imaging:    Last 3 Recorded Weights in this Encounter    03/24/23 1326   Weight: 103 lb 9.6 oz (47 kg)          Assessment:     And     Plan:     Hypothyroidism:jan 2023 - ordering labs  - stay on synthroid 88 mcg a day     Jan 2022  :  TSH normal at 0.87  Currently on namebrand Synthroid 88 mcg daily. 2. Osteoporosis:  Apparently diagnosed in ?  2020  Baseline bone density scan was done at [facility] ([place], [place]). I do not have this for my review. WILL OBTAIN   Received Reclast infusion in January 2021 and January 2022   and  feb 2023 .  Will decide on stopping reclast after next infusion in feb 2024   Ordered DEXA          3. H/o Lung cancer:  on steroids   S/p chemotherapy radiation and immunotherapy. In remission. Regularly following with hematologist oncologist Dr. Patti Mullins.       4. H/o right leg DVT  :  on eliquis       Reviewed the info, discussed med actions, Discussed  changes and spent more than 25 min in interpretation and counseling     Total time  : 42 min          Signed By: Chito Suarez MD     March 24, 2023      Return to clinic 6 months

## 2023-03-24 NOTE — PATIENT INSTRUCTIONS
SPECIFIC INSTRUCTIONS BELOW     Synthroid 88  mcg  A day,  BMN    on empty stomach with water only, no other meds or food or drinks   For next half hour       Take any kind of vitamins, calcium, iron   Pills  4 hours later          -------------PAY ATTENTION TO THESE GENERAL INSTRUCTIONS -----------------      - The medications prescribed at this visit will not be available at pharmacy until 6 pm       - YOUR MED LIST IS NOT UP TO DATE AS SOME CHANGES ARE BEING MADE AFTER THE VISIT - FOLLOW SPECIFIC INSTRUCTIONS  ABOVE     -ANY tests other than blood work, which you opt to do  outside the  [facility] facilities, you are responsible for prior authorizations if  required    - [address] UP TO DATE ON YOUR AVS- PLEASE IGNORE     Results     *Normal results will not be notified by a phone call starting January 1 2021   *If you have an upcoming visit, the results will be discussed at the visit   *Please sign up for MY CHART if you want access to your lab and test results  *Abnormal results which require immediate attention will be notified by phone call   *Abnormal results which do not require immediate assistance will be notified in 1-2 weeks       Refills    -    have your pharmacy send us a refill request . Refills are done max for one year and a visit is a must before refills are extended    Follow up appointments -  highly encourage you to make it when you are checking out. We can accommodate you into the schedule based on your clinical situation, but not for extending refills beyond a year. Labs are important to give refills and is important to get labs before the visit     Phone calls  -  Allow  24 hrs.  for non-urgent calls to be returned  Prior authorization - It may take 2-4 weeks to process  Forms  -  FMLA, DMV etc., will take up to 2 weeks to process  Cancellations - please notify the office 2 days in advance   Samples  - will only be dispensed at visits       If not showing for the appointments and cancelling appointments within 24 hours are kept track of and three  of such situations in  two consecutive years will likely be considered for termination from the practice    -------------------------------------------------------------------------------------------------------------------

## 2023-03-25 LAB
ALBUMIN SERPL-MCNC: 3.4 G/DL (ref 3.5–5)
ALBUMIN/GLOB SERPL: 1.1 (ref 1.1–2.2)
ALP SERPL-CCNC: 46 U/L (ref 45–117)
ALT SERPL-CCNC: 24 U/L (ref 12–78)
ANION GAP SERPL CALC-SCNC: 5 MMOL/L (ref 5–15)
AST SERPL-CCNC: 18 U/L (ref 15–37)
BILIRUB SERPL-MCNC: 0.6 MG/DL (ref 0.2–1)
BUN SERPL-MCNC: 16 MG/DL (ref 6–20)
BUN/CREAT SERPL: 17 (ref 12–20)
CALCIUM SERPL-MCNC: 9.2 MG/DL (ref 8.5–10.1)
CHLORIDE SERPL-SCNC: 102 MMOL/L (ref 97–108)
CO2 SERPL-SCNC: 29 MMOL/L (ref 21–32)
CREAT SERPL-MCNC: 0.96 MG/DL (ref 0.55–1.02)
GLOBULIN SER CALC-MCNC: 3.2 G/DL (ref 2–4)
GLUCOSE SERPL-MCNC: 80 MG/DL (ref 65–100)
POTASSIUM SERPL-SCNC: 4.7 MMOL/L (ref 3.5–5.1)
PROT SERPL-MCNC: 6.6 G/DL (ref 6.4–8.2)
SODIUM SERPL-SCNC: 136 MMOL/L (ref 136–145)
T4 FREE SERPL-MCNC: 1.2 NG/DL (ref 0.8–1.5)
TSH SERPL DL<=0.05 MIU/L-ACNC: 19.4 UIU/ML (ref 0.36–3.74)

## 2023-03-29 ENCOUNTER — TELEPHONE (OUTPATIENT)
Dept: ENDOCRINOLOGY | Age: 83
End: 2023-03-29

## 2023-03-29 DIAGNOSIS — E03.9 ACQUIRED HYPOTHYROIDISM: Primary | ICD-10-CM

## 2023-03-29 NOTE — TELEPHONE ENCOUNTER
Voicemail message received from patient's daughter stating that she saw patient's recent thyroid lab results and just wants to make sure that Dr Lashonda King does not want to change patient's medication dosage before she goes and picks it up.

## 2023-03-29 NOTE — TELEPHONE ENCOUNTER
By patient's body weight 88 mcg is the right dose   Sometimes, we tend to forget taking meds here and there despite supervision   I would rather be safe than increasing the dose as I am new to her care.      Last TSH on the c-care was exactly in feb 2022  a year ago and was normal     So, continuing on same dose 88 mcg a day   Will need to check labs in 3 -4 months  ( I gave her follow up in a year )   So, she needs t2 labs    Juju Cardona MD

## 2023-03-30 NOTE — TELEPHONE ENCOUNTER
Per  , informed pt of result note, as noted above. Pt verbalized understanding with no further questions or concerns at this time. Order placed for pt per verbal order with read back from Dr. James Adams 03/03/23 .        FYI Pt daughter states pt was on Keytruda and just stopped it in Dec.

## 2023-04-30 ENCOUNTER — DOCUMENTATION ONLY (OUTPATIENT)
Dept: ENDOCRINOLOGY | Age: 83
End: 2023-04-30

## 2023-07-13 LAB
T4 FREE SERPL-MCNC: 1.7 NG/DL (ref 0.82–1.77)
TSH SERPL DL<=0.005 MIU/L-ACNC: 9.16 UIU/ML (ref 0.45–4.5)

## 2023-07-13 RX ORDER — LEVOTHYROXINE SODIUM 88 UG/1
TABLET ORAL
Qty: 105 TABLET | Refills: 3 | Status: SHIPPED | OUTPATIENT
Start: 2023-07-13

## 2023-08-11 ENCOUNTER — HOSPITAL ENCOUNTER (OUTPATIENT)
Facility: HOSPITAL | Age: 83
Discharge: HOME | End: 2023-08-14

## 2023-09-22 RX ORDER — LEVOTHYROXINE SODIUM 88 UG/1
TABLET ORAL
Qty: 90 TABLET | Refills: 2 | Status: SHIPPED | OUTPATIENT
Start: 2023-09-22

## 2023-12-05 RX ORDER — LEVOTHYROXINE SODIUM 88 UG/1
TABLET ORAL
Qty: 90 TABLET | Refills: 2 | Status: SHIPPED | OUTPATIENT
Start: 2023-12-05

## 2024-03-14 LAB
25(OH)D3+25(OH)D2 SERPL-MCNC: 83 NG/ML (ref 30–100)
ALBUMIN SERPL-MCNC: 4.1 G/DL (ref 3.7–4.7)
ALBUMIN/GLOB SERPL: 1.5 {RATIO} (ref 1.2–2.2)
ALP SERPL-CCNC: 58 IU/L (ref 44–121)
ALT SERPL-CCNC: 14 IU/L (ref 0–32)
AST SERPL-CCNC: 18 IU/L (ref 0–40)
BILIRUB SERPL-MCNC: 0.3 MG/DL (ref 0–1.2)
BUN SERPL-MCNC: 16 MG/DL (ref 8–27)
BUN/CREAT SERPL: 22 (ref 12–28)
CALCIUM SERPL-MCNC: 10.1 MG/DL (ref 8.7–10.3)
CHLORIDE SERPL-SCNC: 100 MMOL/L (ref 96–106)
CO2 SERPL-SCNC: 21 MMOL/L (ref 20–29)
CREAT SERPL-MCNC: 0.73 MG/DL (ref 0.57–1)
EGFRCR SERPLBLD CKD-EPI 2021: 81 ML/MIN/1.73
GLOBULIN SER CALC-MCNC: 2.8 G/DL (ref 1.5–4.5)
GLUCOSE SERPL-MCNC: 75 MG/DL (ref 70–99)
POTASSIUM SERPL-SCNC: 4.1 MMOL/L (ref 3.5–5.2)
PROT SERPL-MCNC: 6.9 G/DL (ref 6–8.5)
SODIUM SERPL-SCNC: 139 MMOL/L (ref 134–144)
SPECIMEN STATUS REPORT: NORMAL
T4 FREE SERPL-MCNC: 1.56 NG/DL (ref 0.82–1.77)
TSH SERPL DL<=0.005 MIU/L-ACNC: 11.5 UIU/ML (ref 0.45–4.5)

## 2024-03-22 ENCOUNTER — OFFICE VISIT (OUTPATIENT)
Age: 84
End: 2024-03-22
Payer: MEDICARE

## 2024-03-22 VITALS
RESPIRATION RATE: 15 BRPM | BODY MASS INDEX: 17.08 KG/M2 | OXYGEN SATURATION: 96 % | SYSTOLIC BLOOD PRESSURE: 110 MMHG | HEART RATE: 97 BPM | DIASTOLIC BLOOD PRESSURE: 66 MMHG | HEIGHT: 62 IN | WEIGHT: 92.8 LBS | TEMPERATURE: 97 F

## 2024-03-22 DIAGNOSIS — E03.9 ACQUIRED HYPOTHYROIDISM: ICD-10-CM

## 2024-03-22 DIAGNOSIS — E55.9 VITAMIN D DEFICIENCY, UNSPECIFIED: ICD-10-CM

## 2024-03-22 DIAGNOSIS — M81.0 AGE-RELATED OSTEOPOROSIS WITHOUT CURRENT PATHOLOGICAL FRACTURE: Primary | ICD-10-CM

## 2024-03-22 PROCEDURE — 1036F TOBACCO NON-USER: CPT | Performed by: INTERNAL MEDICINE

## 2024-03-22 PROCEDURE — G8419 CALC BMI OUT NRM PARAM NOF/U: HCPCS | Performed by: INTERNAL MEDICINE

## 2024-03-22 PROCEDURE — 1090F PRES/ABSN URINE INCON ASSESS: CPT | Performed by: INTERNAL MEDICINE

## 2024-03-22 PROCEDURE — G8484 FLU IMMUNIZE NO ADMIN: HCPCS | Performed by: INTERNAL MEDICINE

## 2024-03-22 PROCEDURE — 99215 OFFICE O/P EST HI 40 MIN: CPT | Performed by: INTERNAL MEDICINE

## 2024-03-22 PROCEDURE — 1123F ACP DISCUSS/DSCN MKR DOCD: CPT | Performed by: INTERNAL MEDICINE

## 2024-03-22 PROCEDURE — G8399 PT W/DXA RESULTS DOCUMENT: HCPCS | Performed by: INTERNAL MEDICINE

## 2024-03-22 PROCEDURE — G8427 DOCREV CUR MEDS BY ELIG CLIN: HCPCS | Performed by: INTERNAL MEDICINE

## 2024-03-22 RX ORDER — METOPROLOL SUCCINATE 25 MG/1
25 TABLET, EXTENDED RELEASE ORAL DAILY
COMMUNITY
Start: 2024-01-20

## 2024-03-22 RX ORDER — BUDESONIDE 0.5 MG/2ML
1 INHALANT ORAL 2 TIMES DAILY
COMMUNITY

## 2024-03-22 RX ORDER — SODIUM CHLORIDE 1 G/1
1 TABLET ORAL
COMMUNITY

## 2024-03-22 RX ORDER — LEVOTHYROXINE SODIUM 88 MCG
TABLET ORAL
Qty: 105 TABLET | Refills: 3 | Status: SHIPPED | OUTPATIENT
Start: 2024-03-22

## 2024-03-22 RX ORDER — LEVOCETIRIZINE DIHYDROCHLORIDE 5 MG/1
5 TABLET, FILM COATED ORAL DAILY
COMMUNITY
Start: 2024-01-05

## 2024-03-22 RX ORDER — FLUTICASONE PROPIONATE 50 MCG
2 SPRAY, SUSPENSION (ML) NASAL DAILY
COMMUNITY

## 2024-03-22 ASSESSMENT — PATIENT HEALTH QUESTIONNAIRE - PHQ9
SUM OF ALL RESPONSES TO PHQ QUESTIONS 1-9: 0
SUM OF ALL RESPONSES TO PHQ9 QUESTIONS 1 & 2: 0
2. FEELING DOWN, DEPRESSED OR HOPELESS: NOT AT ALL
1. LITTLE INTEREST OR PLEASURE IN DOING THINGS: NOT AT ALL
SUM OF ALL RESPONSES TO PHQ QUESTIONS 1-9: 0

## 2024-03-22 NOTE — PROGRESS NOTES
Patient identified with two identification factors, Name and Date of Birth.    Chief Complaint   Patient presents with    Follow-up     1 year follow-up    Hypothyroidism    Osteoporosis       /66 (Site: Left Upper Arm, Position: Sitting, Cuff Size: Small Adult)   Pulse 97   Temp 97 °F (36.1 °C)   Resp 15   Ht 1.575 m (5' 2\")   Wt 42.1 kg (92 lb 12.8 oz)   SpO2 96%   BMI 16.97 kg/m²       1. \"Have you been to the ER, urgent care clinic since your last visit?  Hospitalized since your last visit?\" Yes February 2024 for fluid build-up.     2. \"Have you seen or consulted any other health care providers outside of the [facility] since your last visit?\" No     
kidney stones in the past.   Never broken any bones.   She has not had bone density scan repeated since she went on Reclast.   Does not require steroids.   Does not do any structured exercise.   No issues with balance.   Up-to-date with dental exam, goes every 6 months.  Last appointment was in October 2021.  She does not have any loose or bothersome teeth.   Does not smoke cigarettes.        Physical Exam:       Constitutional: elderly frail  person     Psychiatric: She has a normal mood and affect.            Assessment  and Plan           Hypothyroidism  :    March 2024 :    TSH has been around 10  for  past 3 checks - march 2023, July 2023  and now     on synthroid  88 mcg a day   and extra pill on Sundays . Daughter affirms that she makes sure that she follows all the instructions with synthroid use     Her TSH levels were high  in July 2023  and I raised to extra pill on Sundays, despite which noticed no change   ? Absorption issue   She is already on higher dose than her body weight . So,  I did not increase dose this visit          March 2023  - as there were no labs, continued her on synthroid  88 mcg daily       Jan 2022  :  TSH normal at 0.87   Currently on namebrand Synthroid 88 mcg daily.         2. Osteoporosis:   Apparently diagnosed in ?  2020   Baseline bone density scan was done at [facility].  I do not have this for my review.     Received Reclast infusion in January 2021 and January 2022   and  feb 2023 march 2024       Date : April 2023   Bone DEXA   ap spine T-score -0.5; left femoral Neck T- score  -2.6, right femoral neck T-score  n/a   ( She had DEXA in march 2022 also , used for comparison )    Report said that there is some improvement of BMD at spine and decrease at hip       Due for DEXA  in  April 2025  and she will follow up  with  me after  RECLAST  next year     Stay on   Vit D  200 int units   and   caltrate            3. H/o Lung cancer:  on steroids    S/p

## 2024-03-22 NOTE — PATIENT INSTRUCTIONS
Synthroid 88 mcg    BRAND    A day   and extra pill on Sundays ,  BMN   on empty stomach with water only, no other meds or food or drinks   For next half hour     Take any kind of vitamins, calcium, iron   Pills  4 hours later    ---------------------------------------------------------------------------------------    She gets the reclast  at [facility], but order is being sent  by our office  ( this can potentially create confusion in 2025 )

## 2024-04-22 ENCOUNTER — APPOINTMENT (OUTPATIENT)
Facility: HOSPITAL | Age: 84
End: 2024-04-22
Payer: MEDICARE

## 2024-04-22 ENCOUNTER — HOSPITAL ENCOUNTER (INPATIENT)
Facility: HOSPITAL | Age: 84
LOS: 4 days | Discharge: HOME | End: 2024-04-26
Attending: EMERGENCY MEDICINE | Admitting: FAMILY MEDICINE
Payer: MEDICARE

## 2024-04-22 DIAGNOSIS — J96.01 ACUTE HYPOXIC RESPIRATORY FAILURE (HCC): ICD-10-CM

## 2024-04-22 DIAGNOSIS — J18.9 PNEUMONIA OF LEFT UPPER LOBE DUE TO INFECTIOUS ORGANISM: Primary | ICD-10-CM

## 2024-04-22 DIAGNOSIS — J44.1 COPD EXACERBATION (HCC): ICD-10-CM

## 2024-04-22 PROBLEM — J16.0 CAP (COMMUNITY ACQUIRED PNEUMONIA) DUE TO CHLAMYDIA SPECIES: Status: ACTIVE | Noted: 2024-04-22

## 2024-04-22 LAB
ALBUMIN SERPL-MCNC: 2.8 G/DL (ref 3.5–5)
ALBUMIN/GLOB SERPL: 0.6 (ref 1.1–2.2)
ALP SERPL-CCNC: 64 U/L (ref 45–117)
ALT SERPL-CCNC: 14 U/L (ref 12–78)
ANION GAP SERPL CALC-SCNC: 7 MMOL/L (ref 5–15)
AST SERPL W P-5'-P-CCNC: 16 U/L (ref 15–37)
BASOPHILS # BLD: 0 K/UL (ref 0–0.1)
BASOPHILS NFR BLD: 0 % (ref 0–1)
BILIRUB SERPL-MCNC: 0.7 MG/DL (ref 0.2–1)
BNP SERPL-MCNC: 1277 PG/ML
BUN SERPL-MCNC: 16 MG/DL (ref 6–20)
BUN/CREAT SERPL: 19 (ref 12–20)
CA-I BLD-MCNC: 9.6 MG/DL (ref 8.5–10.1)
CHLORIDE SERPL-SCNC: 98 MMOL/L (ref 97–108)
CO2 SERPL-SCNC: 29 MMOL/L (ref 21–32)
CREAT SERPL-MCNC: 0.84 MG/DL (ref 0.55–1.02)
DIFFERENTIAL METHOD BLD: ABNORMAL
EKG ATRIAL RATE: 95 BPM
EKG DIAGNOSIS: NORMAL
EKG P AXIS: 75 DEGREES
EKG P-R INTERVAL: 158 MS
EKG Q-T INTERVAL: 356 MS
EKG QRS DURATION: 72 MS
EKG QTC CALCULATION (BAZETT): 447 MS
EKG R AXIS: 77 DEGREES
EKG T AXIS: 76 DEGREES
EKG VENTRICULAR RATE: 95 BPM
EOSINOPHIL # BLD: 0.2 K/UL (ref 0–0.4)
EOSINOPHIL NFR BLD: 1 % (ref 0–7)
ERYTHROCYTE [DISTWIDTH] IN BLOOD BY AUTOMATED COUNT: 14.4 % (ref 11.5–14.5)
GLOBULIN SER CALC-MCNC: 4.5 G/DL (ref 2–4)
GLUCOSE SERPL-MCNC: 125 MG/DL (ref 65–100)
HCT VFR BLD AUTO: 33.4 % (ref 35–47)
HGB BLD-MCNC: 11.1 G/DL (ref 11.5–16)
IMM GRANULOCYTES # BLD AUTO: 0.1 K/UL (ref 0–0.04)
IMM GRANULOCYTES NFR BLD AUTO: 0 % (ref 0–0.5)
LACTATE BLD-SCNC: 0.9 MMOL/L (ref 0.4–2)
LYMPHOCYTES # BLD: 1.1 K/UL (ref 0.8–3.5)
LYMPHOCYTES NFR BLD: 9 % (ref 12–49)
MCH RBC QN AUTO: 28.8 PG (ref 26–34)
MCHC RBC AUTO-ENTMCNC: 33.2 G/DL (ref 30–36.5)
MCV RBC AUTO: 86.8 FL (ref 80–99)
MONOCYTES # BLD: 1 K/UL (ref 0–1)
MONOCYTES NFR BLD: 8 % (ref 5–13)
NEUTS SEG # BLD: 9.6 K/UL (ref 1.8–8)
NEUTS SEG NFR BLD: 82 % (ref 32–75)
NRBC # BLD: 0 K/UL (ref 0–0.01)
NRBC BLD-RTO: 0 PER 100 WBC
PERFORMED BY:: NORMAL
PLATELET # BLD AUTO: 281 K/UL (ref 150–400)
PMV BLD AUTO: 10 FL (ref 8.9–12.9)
POTASSIUM SERPL-SCNC: 4 MMOL/L (ref 3.5–5.1)
PROT SERPL-MCNC: 7.3 G/DL (ref 6.4–8.2)
RBC # BLD AUTO: 3.85 M/UL (ref 3.8–5.2)
SODIUM SERPL-SCNC: 134 MMOL/L (ref 136–145)
TROPONIN I SERPL HS-MCNC: 10 NG/L (ref 0–51)
WBC # BLD AUTO: 12 K/UL (ref 3.6–11)

## 2024-04-22 PROCEDURE — 87040 BLOOD CULTURE FOR BACTERIA: CPT

## 2024-04-22 PROCEDURE — 80053 COMPREHEN METABOLIC PANEL: CPT

## 2024-04-22 PROCEDURE — 84484 ASSAY OF TROPONIN QUANT: CPT

## 2024-04-22 PROCEDURE — 99285 EMERGENCY DEPT VISIT HI MDM: CPT

## 2024-04-22 PROCEDURE — 2580000003 HC RX 258: Performed by: EMERGENCY MEDICINE

## 2024-04-22 PROCEDURE — 93005 ELECTROCARDIOGRAM TRACING: CPT | Performed by: EMERGENCY MEDICINE

## 2024-04-22 PROCEDURE — 71045 X-RAY EXAM CHEST 1 VIEW: CPT

## 2024-04-22 PROCEDURE — 96375 TX/PRO/DX INJ NEW DRUG ADDON: CPT

## 2024-04-22 PROCEDURE — 6370000000 HC RX 637 (ALT 250 FOR IP): Performed by: EMERGENCY MEDICINE

## 2024-04-22 PROCEDURE — 83880 ASSAY OF NATRIURETIC PEPTIDE: CPT

## 2024-04-22 PROCEDURE — 6360000002 HC RX W HCPCS: Performed by: EMERGENCY MEDICINE

## 2024-04-22 PROCEDURE — 83605 ASSAY OF LACTIC ACID: CPT

## 2024-04-22 PROCEDURE — 1100000000 HC RM PRIVATE

## 2024-04-22 PROCEDURE — 85025 COMPLETE CBC W/AUTO DIFF WBC: CPT

## 2024-04-22 PROCEDURE — 36415 COLL VENOUS BLD VENIPUNCTURE: CPT

## 2024-04-22 PROCEDURE — 96365 THER/PROPH/DIAG IV INF INIT: CPT

## 2024-04-22 RX ORDER — SODIUM CHLORIDE 9 MG/ML
INJECTION, SOLUTION INTRAVENOUS PRN
Status: DISCONTINUED | OUTPATIENT
Start: 2024-04-22 | End: 2024-04-26 | Stop reason: HOSPADM

## 2024-04-22 RX ORDER — SODIUM CHLORIDE 0.9 % (FLUSH) 0.9 %
5-40 SYRINGE (ML) INJECTION PRN
Status: DISCONTINUED | OUTPATIENT
Start: 2024-04-22 | End: 2024-04-26 | Stop reason: HOSPADM

## 2024-04-22 RX ORDER — IPRATROPIUM BROMIDE AND ALBUTEROL SULFATE 2.5; .5 MG/3ML; MG/3ML
1 SOLUTION RESPIRATORY (INHALATION)
Status: DISCONTINUED | OUTPATIENT
Start: 2024-04-23 | End: 2024-04-25

## 2024-04-22 RX ORDER — BUDESONIDE AND FORMOTEROL FUMARATE DIHYDRATE 80; 4.5 UG/1; UG/1
2 AEROSOL RESPIRATORY (INHALATION)
Status: DISCONTINUED | OUTPATIENT
Start: 2024-04-22 | End: 2024-04-23

## 2024-04-22 RX ORDER — POTASSIUM CHLORIDE 7.45 MG/ML
10 INJECTION INTRAVENOUS PRN
Status: DISCONTINUED | OUTPATIENT
Start: 2024-04-22 | End: 2024-04-26 | Stop reason: HOSPADM

## 2024-04-22 RX ORDER — POTASSIUM CHLORIDE 20 MEQ/1
40 TABLET, EXTENDED RELEASE ORAL PRN
Status: DISCONTINUED | OUTPATIENT
Start: 2024-04-22 | End: 2024-04-26 | Stop reason: HOSPADM

## 2024-04-22 RX ORDER — FERROUS SULFATE 325(65) MG
325 TABLET ORAL DAILY
Status: DISCONTINUED | OUTPATIENT
Start: 2024-04-22 | End: 2024-04-26 | Stop reason: HOSPADM

## 2024-04-22 RX ORDER — ACETAMINOPHEN 325 MG/1
650 TABLET ORAL EVERY 6 HOURS PRN
Status: DISCONTINUED | OUTPATIENT
Start: 2024-04-22 | End: 2024-04-26 | Stop reason: HOSPADM

## 2024-04-22 RX ORDER — FUROSEMIDE 10 MG/ML
40 INJECTION INTRAMUSCULAR; INTRAVENOUS
Status: COMPLETED | OUTPATIENT
Start: 2024-04-22 | End: 2024-04-22

## 2024-04-22 RX ORDER — LEVOTHYROXINE SODIUM 88 UG/1
88 TABLET ORAL DAILY
Status: DISCONTINUED | OUTPATIENT
Start: 2024-04-23 | End: 2024-04-26 | Stop reason: HOSPADM

## 2024-04-22 RX ORDER — FOLIC ACID 1 MG/1
1 TABLET ORAL DAILY
Status: DISCONTINUED | OUTPATIENT
Start: 2024-04-23 | End: 2024-04-26 | Stop reason: HOSPADM

## 2024-04-22 RX ORDER — TORSEMIDE 5 MG/1
5 TABLET ORAL DAILY
COMMUNITY

## 2024-04-22 RX ORDER — MAGNESIUM SULFATE IN WATER 40 MG/ML
2000 INJECTION, SOLUTION INTRAVENOUS PRN
Status: DISCONTINUED | OUTPATIENT
Start: 2024-04-22 | End: 2024-04-26 | Stop reason: HOSPADM

## 2024-04-22 RX ORDER — ONDANSETRON 2 MG/ML
4 INJECTION INTRAMUSCULAR; INTRAVENOUS EVERY 6 HOURS PRN
Status: DISCONTINUED | OUTPATIENT
Start: 2024-04-22 | End: 2024-04-26 | Stop reason: HOSPADM

## 2024-04-22 RX ORDER — HYDROXYZINE PAMOATE 25 MG/1
25 CAPSULE ORAL EVERY 6 HOURS PRN
Status: DISCONTINUED | OUTPATIENT
Start: 2024-04-22 | End: 2024-04-26 | Stop reason: HOSPADM

## 2024-04-22 RX ORDER — ACETAMINOPHEN 650 MG/1
650 SUPPOSITORY RECTAL EVERY 6 HOURS PRN
Status: DISCONTINUED | OUTPATIENT
Start: 2024-04-22 | End: 2024-04-26 | Stop reason: HOSPADM

## 2024-04-22 RX ORDER — METHYLPREDNISOLONE SODIUM SUCCINATE 40 MG/ML
40 INJECTION, POWDER, LYOPHILIZED, FOR SOLUTION INTRAMUSCULAR; INTRAVENOUS EVERY 6 HOURS
Status: DISCONTINUED | OUTPATIENT
Start: 2024-04-23 | End: 2024-04-25

## 2024-04-22 RX ORDER — TORSEMIDE 10 MG/1
5 TABLET ORAL DAILY
Status: DISCONTINUED | OUTPATIENT
Start: 2024-04-23 | End: 2024-04-26 | Stop reason: HOSPADM

## 2024-04-22 RX ORDER — SODIUM CHLORIDE 1 G/1
1 TABLET ORAL 2 TIMES DAILY WITH MEALS
Status: DISCONTINUED | OUTPATIENT
Start: 2024-04-23 | End: 2024-04-24

## 2024-04-22 RX ORDER — IPRATROPIUM BROMIDE AND ALBUTEROL SULFATE 2.5; .5 MG/3ML; MG/3ML
1 SOLUTION RESPIRATORY (INHALATION) EVERY 4 HOURS PRN
Status: DISCONTINUED | OUTPATIENT
Start: 2024-04-22 | End: 2024-04-26 | Stop reason: HOSPADM

## 2024-04-22 RX ORDER — METOPROLOL SUCCINATE 25 MG/1
25 TABLET, EXTENDED RELEASE ORAL DAILY
Status: DISCONTINUED | OUTPATIENT
Start: 2024-04-23 | End: 2024-04-26 | Stop reason: HOSPADM

## 2024-04-22 RX ORDER — SODIUM CHLORIDE 0.9 % (FLUSH) 0.9 %
5-40 SYRINGE (ML) INJECTION EVERY 12 HOURS SCHEDULED
Status: DISCONTINUED | OUTPATIENT
Start: 2024-04-22 | End: 2024-04-26 | Stop reason: HOSPADM

## 2024-04-22 RX ORDER — BUDESONIDE 0.5 MG/2ML
500 INHALANT ORAL
Status: DISCONTINUED | OUTPATIENT
Start: 2024-04-23 | End: 2024-04-26 | Stop reason: HOSPADM

## 2024-04-22 RX ORDER — ONDANSETRON 4 MG/1
4 TABLET, ORALLY DISINTEGRATING ORAL EVERY 8 HOURS PRN
Status: DISCONTINUED | OUTPATIENT
Start: 2024-04-22 | End: 2024-04-26 | Stop reason: HOSPADM

## 2024-04-22 RX ORDER — POLYETHYLENE GLYCOL 3350 17 G/17G
17 POWDER, FOR SOLUTION ORAL DAILY PRN
Status: DISCONTINUED | OUTPATIENT
Start: 2024-04-22 | End: 2024-04-26 | Stop reason: HOSPADM

## 2024-04-22 RX ORDER — IPRATROPIUM BROMIDE AND ALBUTEROL SULFATE 2.5; .5 MG/3ML; MG/3ML
1 SOLUTION RESPIRATORY (INHALATION)
Status: COMPLETED | OUTPATIENT
Start: 2024-04-22 | End: 2024-04-22

## 2024-04-22 RX ORDER — METHYLPREDNISOLONE SODIUM SUCCINATE 125 MG/2ML
125 INJECTION, POWDER, LYOPHILIZED, FOR SOLUTION INTRAMUSCULAR; INTRAVENOUS
Status: DISCONTINUED | OUTPATIENT
Start: 2024-04-22 | End: 2024-04-22

## 2024-04-22 RX ADMIN — CEFTRIAXONE SODIUM 1000 MG: 1 INJECTION, POWDER, FOR SOLUTION INTRAMUSCULAR; INTRAVENOUS at 18:42

## 2024-04-22 RX ADMIN — AZITHROMYCIN MONOHYDRATE 500 MG: 500 INJECTION, POWDER, LYOPHILIZED, FOR SOLUTION INTRAVENOUS at 18:49

## 2024-04-22 RX ADMIN — FUROSEMIDE 40 MG: 10 INJECTION, SOLUTION INTRAMUSCULAR; INTRAVENOUS at 17:26

## 2024-04-22 RX ADMIN — IPRATROPIUM BROMIDE AND ALBUTEROL SULFATE 1 DOSE: .5; 3 SOLUTION RESPIRATORY (INHALATION) at 17:25

## 2024-04-22 ASSESSMENT — LIFESTYLE VARIABLES
HOW MANY STANDARD DRINKS CONTAINING ALCOHOL DO YOU HAVE ON A TYPICAL DAY: PATIENT DOES NOT DRINK
HOW OFTEN DO YOU HAVE A DRINK CONTAINING ALCOHOL: NEVER

## 2024-04-22 ASSESSMENT — PAIN - FUNCTIONAL ASSESSMENT: PAIN_FUNCTIONAL_ASSESSMENT: NONE - DENIES PAIN

## 2024-04-22 NOTE — ED TRIAGE NOTES
Per EMS pt was at cancer center for an iron infusion. Per family pt has been SOB for the last week that has increased with exertion.    Given by [facility] center through pt's port: 62.5 mg solumedrol; 20 mg lasix @1615

## 2024-04-22 NOTE — ED PROVIDER NOTES
[facility] EMERGENCY DEPT  EMERGENCY DEPARTMENT HISTORY AND PHYSICAL EXAM      Date: 4/22/2024  Patient Name: Mey Lawrence  MRN: [id number]  YOB: 1940  Date of evaluation: 4/22/2024  Provider: Milli Tracy MD   Note Started: 5:17 PM EDT 4/22/24    HISTORY OF PRESENT ILLNESS     Chief Complaint   Patient presents with    Shortness of Breath       History Provided By: Patient    HPI: Mey Lawrence is a 84-year-old female history of COPD, lung cancer here with shortness of breath.  Patient has been having some shortness of breath of the past week has been progressively worsening.  She was at the cancer center getting an iron infusion today.  Was noted to be hypoxic and started on oxygen and sent here.  Was given I 62.5 mg of IV Solu-Medrol and 20 mg of IV Lasix prior to arrival.  Patient has lower extremity swelling but states this comes and goes for her.  January 2022 echo normal      PAST MEDICAL HISTORY   Past Medical History:  Past Medical History:   Diagnosis Date    Chronic obstructive pulmonary disease (HCC)     Hypertension     Hypothyroidism     Lung cancer (HCC)        Past Surgical History:  No past surgical history on file.    Family History:  No family history on file.    Social History:  Social History     Tobacco Use    Smoking status: Never    Smokeless tobacco: Never   Vaping Use    Vaping Use: Never used   Substance Use Topics    Alcohol use: Never    Drug use: Never       Allergies:  No Known Allergies    PCP: Bela Jolley MD    Current Meds:   Current Facility-Administered Medications   Medication Dose Route Frequency Provider Last Rate Last Admin    azithromycin (ZITHROMAX) 500 mg in sodium chloride 0.9 % 250 mL IVPB ([id number])  500 mg IntraVENous Once Milli Tracy  mL/hr at 04/22/24 1849 500 mg at 04/22/24 1849     Current Outpatient Medications   Medication Sig Dispense Refill    torsemide (DEMADEX) 5 MG tablet Take 1 tablet by mouth daily      metoprolol  Albumin/Globulin Ratio 0.6 (L) 1.1 - 2.2     Troponin    Collection Time: 04/22/24  5:17 PM   Result Value Ref Range    Troponin, High Sensitivity 10 0 - 51 ng/L   Brain Natriuretic Peptide    Collection Time: 04/22/24  5:17 PM   Result Value Ref Range    NT Pro-BNP 1,277 (H) <450 pg/mL   POC Lactic Acid    Collection Time: 04/22/24  6:39 PM   Result Value Ref Range    POC Lactic Acid 0.90 0.40 - 2.00 mmol/L    Performed by: Lino Lane        EKG: EKG interpreted by me. Shows NSR rate 95, normal axis, normal intervals, no STEMI    Radiologic Studies:  Non-plain film images such as CT, Ultrasound and MRI are read by the radiologist. Plain radiographic images are visualized and preliminarily interpreted by the ED Physician with the following findings: See ED Course Below    Interpretation per the Radiologist below, if available at the time of this note:  XR CHEST PORTABLE   Final Result   1. Probable left upper lobe pneumonia.   2. Increased, chronic, moderate, left pleural effusion.   3. Chronic left lower lobe collapse.           ED COURSE and DIFFERENTIAL DIAGNOSIS/MDM   7:00 PM Differential and Considerations: 84-year-old female with history of COPD, lung cancer presenting with shortness of breath progressively worsening over the past week.  Worsened while receiving iron transfusion.  On exam patient is requiring 2 L of oxygen, has diffuse wheezing, diminished breath sounds, rales, JVD and lower extremity edema.  Differential includes CHF, COPD exacerbation, acute anemia,, pneumonia, PE.  Will obtain labs, chest x-ray, EKG.  IV Lasix, DuoNebs ordered for treatment.  Received steroids prior to arrival    Records Reviewed (source and summary of external notes): Prior medical records and Nursing notes    Vitals:    Vitals:    04/22/24 1730 04/22/24 1753 04/22/24 1800 04/22/24 1830   BP: (!) 140/80  (!) 112/57 127/67   Pulse: 89 97 94 89   Resp: 20 30 27 24   Temp:       TempSrc:       SpO2: 100%  100% 99%

## 2024-04-22 NOTE — ED NOTES
Pt voided 700 ml yellow-clear urine, sample sent to lab--no orders at this time. Suction canister emptied and placed back on suction.

## 2024-04-23 ENCOUNTER — HOSPITAL ENCOUNTER (INPATIENT)
Facility: HOSPITAL | Age: 84
Discharge: HOME | End: 2024-04-25
Attending: FAMILY MEDICINE
Payer: MEDICARE

## 2024-04-23 LAB
ALBUMIN SERPL-MCNC: 2.6 G/DL (ref 3.5–5)
ALBUMIN/GLOB SERPL: 0.5 (ref 1.1–2.2)
ALP SERPL-CCNC: 65 U/L (ref 45–117)
ALT SERPL-CCNC: 15 U/L (ref 12–78)
ANION GAP SERPL CALC-SCNC: 8 MMOL/L (ref 5–15)
AST SERPL W P-5'-P-CCNC: 16 U/L (ref 15–37)
BASOPHILS # BLD: 0 K/UL (ref 0–0.1)
BASOPHILS NFR BLD: 0 % (ref 0–1)
BILIRUB SERPL-MCNC: 0.4 MG/DL (ref 0.2–1)
BNP SERPL-MCNC: 1411 PG/ML
BUN SERPL-MCNC: 20 MG/DL (ref 6–20)
BUN/CREAT SERPL: 24 (ref 12–20)
CA-I BLD-MCNC: 10 MG/DL (ref 8.5–10.1)
CHLORIDE SERPL-SCNC: 99 MMOL/L (ref 97–108)
CO2 SERPL-SCNC: 27 MMOL/L (ref 21–32)
CREAT SERPL-MCNC: 0.82 MG/DL (ref 0.55–1.02)
DIFFERENTIAL METHOD BLD: ABNORMAL
ECHO AO ASC DIAM: 2.8 CM
ECHO AO ASCENDING AORTA INDEX: 1.93 CM/M2
ECHO AO ROOT DIAM: 3 CM
ECHO AO ROOT INDEX: 2.07 CM/M2
ECHO BSA: 1.44 M2
ECHO IVC EXP: 1.5 CM
ECHO LA AREA 2C: 9.4 CM2
ECHO LA AREA 4C: 12 CM2
ECHO LA DIAMETER INDEX: 1.59 CM/M2
ECHO LA DIAMETER: 2.3 CM
ECHO LA MAJOR AXIS: 4.4 CM
ECHO LA MINOR AXIS: 4.8 CM
ECHO LA TO AORTIC ROOT RATIO: 0.77
ECHO LA VOL BP: 21 ML (ref 22–52)
ECHO LA VOL MOD A2C: 16 ML (ref 22–52)
ECHO LA VOL MOD A4C: 27 ML (ref 22–52)
ECHO LA VOL/BSA BIPLANE: 14 ML/M2 (ref 16–34)
ECHO LA VOLUME INDEX MOD A2C: 11 ML/M2 (ref 16–34)
ECHO LA VOLUME INDEX MOD A4C: 19 ML/M2 (ref 16–34)
ECHO LV E' LATERAL VELOCITY: 4 CM/S
ECHO LV E' SEPTAL VELOCITY: 5 CM/S
ECHO LV EDV A2C: 39 ML
ECHO LV EDV A4C: 43 ML
ECHO LV EDV INDEX A4C: 30 ML/M2
ECHO LV EDV NDEX A2C: 27 ML/M2
ECHO LV EJECTION FRACTION A2C: 72 %
ECHO LV EJECTION FRACTION A4C: 69 %
ECHO LV EJECTION FRACTION BIPLANE: 71 % (ref 55–100)
ECHO LV ESV A2C: 11 ML
ECHO LV ESV A4C: 13 ML
ECHO LV ESV INDEX A2C: 8 ML/M2
ECHO LV ESV INDEX A4C: 9 ML/M2
ECHO LV FRACTIONAL SHORTENING: 38 % (ref 28–44)
ECHO LV INTERNAL DIMENSION DIASTOLE INDEX: 2.34 CM/M2
ECHO LV INTERNAL DIMENSION DIASTOLIC: 3.4 CM (ref 3.9–5.3)
ECHO LV INTERNAL DIMENSION SYSTOLIC INDEX: 1.45 CM/M2
ECHO LV INTERNAL DIMENSION SYSTOLIC: 2.1 CM
ECHO LV IVSD: 0.9 CM (ref 0.6–0.9)
ECHO LV MASS 2D: 106.3 G (ref 67–162)
ECHO LV MASS INDEX 2D: 73.3 G/M2 (ref 43–95)
ECHO LV POSTERIOR WALL DIASTOLIC: 1.2 CM (ref 0.6–0.9)
ECHO LV RELATIVE WALL THICKNESS RATIO: 0.71
ECHO LVOT AREA: 1.8 CM2
ECHO LVOT DIAM: 1.5 CM
ECHO MV A VELOCITY: 1.68 M/S
ECHO MV E DECELERATION TIME (DT): 114 MS
ECHO MV E VELOCITY: 1.38 M/S
ECHO MV E/A RATIO: 0.82
ECHO MV E/E' LATERAL: 34.5
ECHO MV E/E' RATIO (AVERAGED): 31.05
ECHO MV MAX VELOCITY: 1.8 M/S
ECHO MV MEAN GRADIENT: 7 MMHG
ECHO MV MEAN VELOCITY: 1.2 M/S
ECHO MV PEAK GRADIENT: 13 MMHG
ECHO MV VTI: 24.5 CM
ECHO PULMONARY ARTERY END DIASTOLIC PRESSURE: 4 MMHG
ECHO PV ACCELERATION TIME (AT): 103 MS
ECHO PV MAX VELOCITY: 1.4 M/S
ECHO PV PEAK GRADIENT: 8 MMHG
ECHO PV REGURGITANT MAX VELOCITY: 1 M/S
ECHO RA AREA 4C: 9 CM2
ECHO RA END SYSTOLIC VOLUME APICAL 4 CHAMBER INDEX BSA: 11 ML/M2
ECHO RA VOLUME: 16 ML
ECHO RV BASAL DIMENSION: 3 CM
ECHO RV FREE WALL PEAK S': 23 CM/S
ECHO RV TAPSE: 1.9 CM (ref 1.7–?)
ECHO TV REGURGITANT MAX VELOCITY: 2.5 M/S
ECHO TV REGURGITANT PEAK GRADIENT: 25 MMHG
EOSINOPHIL # BLD: 0 K/UL (ref 0–0.4)
EOSINOPHIL NFR BLD: 0 % (ref 0–7)
ERYTHROCYTE [DISTWIDTH] IN BLOOD BY AUTOMATED COUNT: 14.3 % (ref 11.5–14.5)
GLOBULIN SER CALC-MCNC: 5.6 G/DL (ref 2–4)
GLUCOSE SERPL-MCNC: 131 MG/DL (ref 65–100)
HCT VFR BLD AUTO: 35 % (ref 35–47)
HGB BLD-MCNC: 11.8 G/DL (ref 11.5–16)
IMM GRANULOCYTES # BLD AUTO: 0.1 K/UL (ref 0–0.04)
IMM GRANULOCYTES NFR BLD AUTO: 0 % (ref 0–0.5)
LYMPHOCYTES # BLD: 0.7 K/UL (ref 0.8–3.5)
LYMPHOCYTES NFR BLD: 6 % (ref 12–49)
MCH RBC QN AUTO: 28.9 PG (ref 26–34)
MCHC RBC AUTO-ENTMCNC: 33.7 G/DL (ref 30–36.5)
MCV RBC AUTO: 85.6 FL (ref 80–99)
MONOCYTES # BLD: 0.4 K/UL (ref 0–1)
MONOCYTES NFR BLD: 4 % (ref 5–13)
NEUTS SEG # BLD: 10.5 K/UL (ref 1.8–8)
NEUTS SEG NFR BLD: 90 % (ref 32–75)
NRBC # BLD: 0 K/UL (ref 0–0.01)
NRBC BLD-RTO: 0 PER 100 WBC
PLATELET # BLD AUTO: 347 K/UL (ref 150–400)
PMV BLD AUTO: 11 FL (ref 8.9–12.9)
POTASSIUM SERPL-SCNC: 3.7 MMOL/L (ref 3.5–5.1)
PROT SERPL-MCNC: 8.2 G/DL (ref 6.4–8.2)
RBC # BLD AUTO: 4.09 M/UL (ref 3.8–5.2)
SODIUM SERPL-SCNC: 134 MMOL/L (ref 136–145)
TROPONIN I SERPL HS-MCNC: 6 NG/L (ref 0–51)
WBC # BLD AUTO: 11.7 K/UL (ref 3.6–11)

## 2024-04-23 PROCEDURE — 6370000000 HC RX 637 (ALT 250 FOR IP): Performed by: FAMILY MEDICINE

## 2024-04-23 PROCEDURE — 93306 TTE W/DOPPLER COMPLETE: CPT

## 2024-04-23 PROCEDURE — 94640 AIRWAY INHALATION TREATMENT: CPT

## 2024-04-23 PROCEDURE — 85025 COMPLETE CBC W/AUTO DIFF WBC: CPT

## 2024-04-23 PROCEDURE — 80053 COMPREHEN METABOLIC PANEL: CPT

## 2024-04-23 PROCEDURE — 6360000002 HC RX W HCPCS: Performed by: FAMILY MEDICINE

## 2024-04-23 PROCEDURE — 94761 N-INVAS EAR/PLS OXIMETRY MLT: CPT

## 2024-04-23 PROCEDURE — 2580000003 HC RX 258: Performed by: FAMILY MEDICINE

## 2024-04-23 PROCEDURE — 83880 ASSAY OF NATRIURETIC PEPTIDE: CPT

## 2024-04-23 PROCEDURE — 84484 ASSAY OF TROPONIN QUANT: CPT

## 2024-04-23 PROCEDURE — 87040 BLOOD CULTURE FOR BACTERIA: CPT

## 2024-04-23 PROCEDURE — 36415 COLL VENOUS BLD VENIPUNCTURE: CPT

## 2024-04-23 PROCEDURE — 2700000000 HC OXYGEN THERAPY PER DAY

## 2024-04-23 PROCEDURE — 1100000000 HC RM PRIVATE

## 2024-04-23 RX ORDER — HYDROXYZINE HYDROCHLORIDE 25 MG/ML
25 INJECTION, SOLUTION INTRAMUSCULAR EVERY 6 HOURS PRN
Status: DISCONTINUED | OUTPATIENT
Start: 2024-04-23 | End: 2024-04-26 | Stop reason: HOSPADM

## 2024-04-23 RX ORDER — FUROSEMIDE 10 MG/ML
40 INJECTION INTRAMUSCULAR; INTRAVENOUS ONCE
Status: COMPLETED | OUTPATIENT
Start: 2024-04-23 | End: 2024-04-23

## 2024-04-23 RX ADMIN — CEFTRIAXONE SODIUM 1000 MG: 1 INJECTION, POWDER, FOR SOLUTION INTRAMUSCULAR; INTRAVENOUS at 20:29

## 2024-04-23 RX ADMIN — METHYLPREDNISOLONE SODIUM SUCCINATE 40 MG: 40 INJECTION INTRAMUSCULAR; INTRAVENOUS at 17:54

## 2024-04-23 RX ADMIN — FUROSEMIDE 40 MG: 10 INJECTION, SOLUTION INTRAMUSCULAR; INTRAVENOUS at 10:11

## 2024-04-23 RX ADMIN — FOLIC ACID 1 MG: 1 TABLET ORAL at 10:09

## 2024-04-23 RX ADMIN — IPRATROPIUM BROMIDE AND ALBUTEROL SULFATE 1 DOSE: .5; 2.5 SOLUTION RESPIRATORY (INHALATION) at 11:08

## 2024-04-23 RX ADMIN — Medication 1 G: at 10:10

## 2024-04-23 RX ADMIN — APIXABAN 2.5 MG: 2.5 TABLET, FILM COATED ORAL at 20:30

## 2024-04-23 RX ADMIN — FERROUS SULFATE TAB 325 MG (65 MG ELEMENTAL FE) 325 MG: 325 (65 FE) TAB at 10:09

## 2024-04-23 RX ADMIN — Medication 1 G: at 17:54

## 2024-04-23 RX ADMIN — METOPROLOL SUCCINATE 25 MG: 25 TABLET, FILM COATED, EXTENDED RELEASE ORAL at 10:09

## 2024-04-23 RX ADMIN — APIXABAN 2.5 MG: 2.5 TABLET, FILM COATED ORAL at 10:10

## 2024-04-23 RX ADMIN — METHYLPREDNISOLONE SODIUM SUCCINATE 40 MG: 40 INJECTION INTRAMUSCULAR; INTRAVENOUS at 10:16

## 2024-04-23 RX ADMIN — METHYLPREDNISOLONE SODIUM SUCCINATE 40 MG: 40 INJECTION INTRAMUSCULAR; INTRAVENOUS at 13:06

## 2024-04-23 RX ADMIN — SODIUM CHLORIDE, PRESERVATIVE FREE 10 ML: 5 INJECTION INTRAVENOUS at 20:31

## 2024-04-23 RX ADMIN — BUDESONIDE 500 MCG: 0.5 INHALANT ORAL at 20:34

## 2024-04-23 RX ADMIN — BUDESONIDE 500 MCG: 0.5 INHALANT ORAL at 07:42

## 2024-04-23 RX ADMIN — SODIUM CHLORIDE, PRESERVATIVE FREE 10 ML: 5 INJECTION INTRAVENOUS at 10:11

## 2024-04-23 RX ADMIN — IPRATROPIUM BROMIDE AND ALBUTEROL SULFATE 1 DOSE: .5; 2.5 SOLUTION RESPIRATORY (INHALATION) at 15:11

## 2024-04-23 RX ADMIN — AZITHROMYCIN MONOHYDRATE 500 MG: 500 INJECTION, POWDER, LYOPHILIZED, FOR SOLUTION INTRAVENOUS at 20:29

## 2024-04-23 RX ADMIN — IPRATROPIUM BROMIDE AND ALBUTEROL SULFATE 1 DOSE: .5; 2.5 SOLUTION RESPIRATORY (INHALATION) at 20:34

## 2024-04-23 RX ADMIN — HYDROXYZINE HYDROCHLORIDE 25 MG: 25 INJECTION, SOLUTION INTRAMUSCULAR at 20:30

## 2024-04-23 RX ADMIN — IPRATROPIUM BROMIDE AND ALBUTEROL SULFATE 1 DOSE: .5; 2.5 SOLUTION RESPIRATORY (INHALATION) at 07:42

## 2024-04-23 RX ADMIN — TORSEMIDE 5 MG: 10 TABLET ORAL at 10:09

## 2024-04-23 ASSESSMENT — ENCOUNTER SYMPTOMS
SHORTNESS OF BREATH: 1
COUGH: 1
GASTROINTESTINAL NEGATIVE: 1

## 2024-04-23 NOTE — H&P
History and Physical    NAME:   Mey Lawrence   :  1940   MRN:  [id number]     Date/Time: 2024 8:03 PM    Patient PCP: Bela Jolley MD  ______________________________________________________________________       Subjective:     CHIEF COMPLAINT:       Shortness of breath        HISTORY OF PRESENT ILLNESS:       Patient is a 84 y.o. year old female past medical history of lung cancer status post history of COPD hypertension congestive heart failure last treatment was in 2024 came to emergency room complaining of shortness of breath  And was at [facility] for iron infusion.  Patient developed shortness of breath saturation drops patient having shortness of breath for little exertion for a few weeks    Seen by the ER physician workup done including the chest x-ray shows left upper lobe pneumonia increased chronic moderate left pleural effusion and chronic left lung collapse    Patient was admitted for further evaluation and treatment    Past Medical History:   Diagnosis Date    Chronic obstructive pulmonary disease (HCC)     Hypertension     Hypothyroidism     Lung cancer (HCC)         No past surgical history on file.    Social History     Tobacco Use    Smoking status: Never    Smokeless tobacco: Never   Substance Use Topics    Alcohol use: Never        No family history on file.    No Known Allergies     Prior to Admission medications    Medication Sig Start Date End Date Taking? Authorizing Provider   torsemide (DEMADEX) 5 MG tablet Take 1 tablet by mouth daily    Tyler Barr MD   metoprolol succinate (TOPROL XL) 25 MG extended release tablet Take 1 tablet by mouth daily 24   Tyler Barr MD   levocetirizine (XYZAL) 5 MG tablet Take 1 tablet by mouth daily 24   Tyler Barr MD   sodium chloride 1 g tablet Take 1 tablet by mouth    Tyler Barr MD   Multiple Vitamins-Minerals (PRESERVISION AREDS PO) Take by mouth daily Take 1 capsule  (FLONASE) 50 MCG/ACT nasal spray, 2 sprays by Each Nostril route daily, Disp: , Rfl:     SYNTHROID 88 MCG tablet, One pill a day daily but on Sundays 2 pills a day before b-fast, Disp: 105 tablet, Rfl: 3    albuterol sulfate HFA (PROVENTIL;VENTOLIN;PROAIR) 108 (90 Base) MCG/ACT inhaler, albuterol sulfate HFA 90 mcg/actuation aerosol inhaler, Disp: , Rfl:     apixaban (ELIQUIS) 2.5 MG TABS tablet, Take 1 tablet by mouth 2 times daily, Disp: , Rfl:     calcium carb-cholecalciferol 600-10 MG-MCG TABS per tab, Take 1 tablet by mouth daily, Disp: , Rfl:     Cholecalciferol 50 MCG (2000 UT) TABS, Take by mouth (Patient not taking: Reported on 4/22/2024), Disp: , Rfl:     dexamethasone (DECADRON) 4 MG tablet, dexamethasone 4 mg tablet  TAKE 1 TABLET TWICE DAILY THE DAY BEFORE AND THE DAY AFTER EACH TREATMENT, Disp: , Rfl:     ferrous sulfate (SLOW FE) 142 (45 Fe) MG extended release tablet, Take by mouth daily, Disp: , Rfl:     folic acid (FOLVITE) 1 MG tablet, Take 1 tablet by mouth daily, Disp: , Rfl:     ipratropium-albuterol (DUONEB) 0.5-2.5 (3) MG/3ML SOLN nebulizer solution, Inhale 3 mLs into the lungs every 4 hours as needed, Disp: , Rfl:     ondansetron (ZOFRAN) 8 MG tablet, ondansetron HCl 8 mg tablet  TAKE 1 TABLET BY MOUTH EVERY 8 HOURS AS NEEDED FOR NAUSEA, Disp: , Rfl:     potassium chloride (KLOR-CON) 10 MEQ extended release tablet, Take 2 tablets by mouth daily, Disp: , Rfl:        ________________________________________________________________________    Signed: Alix Rey MD

## 2024-04-23 NOTE — ED NOTES
Assessment of Patient showed no complications or disturbances , Flushed and secured patients  Port that was started at Chemo .  Secured IV with Coban.   Vitals are noted in chart .

## 2024-04-23 NOTE — PROGRESS NOTES
Globulin 5.6 (H) 2.0 - 4.0 g/dL    Albumin/Globulin Ratio 0.5 (L) 1.1 - 2.2     CBC with Auto Differential    Collection Time: 04/23/24  5:50 AM   Result Value Ref Range    WBC 11.7 (H) 3.6 - 11.0 K/uL    RBC 4.09 3.80 - 5.20 M/uL    Hemoglobin 11.8 11.5 - 16.0 g/dL    Hematocrit 35.0 35.0 - 47.0 %    MCV 85.6 80.0 - 99.0 FL    MCH 28.9 26.0 - 34.0 PG    MCHC 33.7 30.0 - 36.5 g/dL    RDW 14.3 11.5 - 14.5 %    Platelets 347 150 - 400 K/uL    MPV 11.0 8.9 - 12.9 FL    Nucleated RBCs 0.0 0.0  WBC    nRBC 0.00 0.00 - 0.01 K/uL    Neutrophils % 90 (H) 32 - 75 %    Lymphocytes % 6 (L) 12 - 49 %    Monocytes % 4 (L) 5 - 13 %    Eosinophils % 0 0 - 7 %    Basophils % 0 0 - 1 %    Immature Granulocytes % 0 0 - 0.5 %    Neutrophils Absolute 10.5 (H) 1.8 - 8.0 K/UL    Lymphocytes Absolute 0.7 (L) 0.8 - 3.5 K/UL    Monocytes Absolute 0.4 0.0 - 1.0 K/UL    Eosinophils Absolute 0.0 0.0 - 0.4 K/UL    Basophils Absolute 0.0 0.0 - 0.1 K/UL    Immature Granulocytes Absolute 0.1 (H) 0.00 - 0.04 K/UL    Differential Type AUTOMATED     Brain Natriuretic Peptide    Collection Time: 04/23/24  5:50 AM   Result Value Ref Range    NT Pro-BNP 1,411 (H) <450 pg/mL   Troponin    Collection Time: 04/23/24  5:50 AM   Result Value Ref Range    Troponin, High Sensitivity 6 0 - 51 ng/L   Echo (TTE) complete (PRN contrast/bubble/strain/3D)    Collection Time: 04/23/24  9:52 AM   Result Value Ref Range    LV EDV A2C 39 mL    LV EDV A4C 43 mL    LV ESV A2C 11 mL    LV ESV A4C 13 mL    IVSd 0.9 0.6 - 0.9 cm    LVIDd 3.4 (A) 3.9 - 5.3 cm    LVIDs 2.1 cm    LVOT Diameter 1.5 cm    LVPWd 1.2 (A) 0.6 - 0.9 cm    LV E' Lateral Velocity 4 cm/s    LV E' Septal Velocity 5 cm/s    LV Ejection Fraction A2C 72 %    LV Ejection Fraction A4C 69 %    EF BP 71 55 - 100 %    LVOT Area 1.8 cm2    LA Minor Axis 4.8 cm    LA Major Webster 4.4 cm    LA Area 2C 9.4 cm2    LA Area 4C 12.0 cm2    LA Volume MOD A2C 16 (A) 22 - 52 mL    LA Volume MOD A4C 27 22 - 52 mL    LA  no guarding.   Musculoskeletal: Normal range of motion.   Neurological: pt is alert and oriented to person, place, and time. Alert. Normal strength. No cranial nerve deficit or sensory deficit.        ASSESSMENT & PLAN:    Community-acquired pneumonia  Pleural effusion  Acute respiratory failure hypoxic  History of lung cancer  COPD  Hypertension  Congestive heart failure  Hyponatremia    Admit to medical telemetry floor  After blood cultures start on Zithromax and Rocephin  Nebulizer treatment Symbicort inhaler and IV Solu-Medrol  Pulmonary consult hematology consult and nephrology consult    Continue Eliquis 2.5 mg twice a day Zithromax 500 mg IV daily Pulmicort 500 twice a day Rocephin 1 g every 24 hours ferrous sulfate 325 mg daily folic acid 1 mg daily DuoNeb nebulizer every 4 hours levothyroxine 88 mcg daily Solu-Medrol 40 mg IV every 6 metoprolol 25 mg daily sodium chloride tablet 1 g twice a day torsemide 5 mg daily     Lasix 40 IV today  Repeat the labs in the morning PT OT consult consult respiratory for resting and ambulatory pulse           ________________________________________________________________________    Signed: Alix Rey MD

## 2024-04-23 NOTE — CONSULTS
PULMONARY CONSULT  [facility]    Name: Mey Lawrence MRN: [id number]   : 1940 Hospital: [facility]   Date: 2024  Admission date: 2024 Hospital Day: 2       HPI:     Patient Active Problem List   Diagnosis    Shortness of breath    COPD (chronic obstructive pulmonary disease) (HCC)    Elevated troponin    Lung cancer (HCC)    PNA (pneumonia)    Hyponatremia    CAP (community acquired pneumonia) due to Chlamydia species             [x] High complexity decision making was performed  [x] See my orders for details      Subjective/Initial History:     I was asked by Alix Rey MD to see Mey Lawrence  a 84 y.o.   female in consultation     Excerpts from admission 2024 or consult notes as follows:   84-year-old lady came in because of shortness of breath and dyspnea history of lung cancer COPD hypertension hypothyroidism she is confused unable to give good history apparently patient is not on oxygen at home her  is on oxygen condition got worse and came to the hospital chest x-ray was done which shows left upper lobe infiltrate and left pleural effusion with collapse so admitted and pulmonary consult was called      Allergies   Allergen Reactions    Banana Rash        MAR reviewed and pertinent medications noted or modified as needed     Current Facility-Administered Medications   Medication Dose Route Frequency Provider Last Rate Last Admin    apixaban (ELIQUIS) tablet 2.5 mg  2.5 mg Oral BID Alix Rey MD   2.5 mg at 24 1010    budesonide (PULMICORT) nebulizer suspension 500 mcg  500 mcg Nebulization BID RT Alix Rey MD   500 mcg at 24 0742    ferrous sulfate (IRON 325) tablet 325 mg  325 mg Oral Daily Alix Rey MD   325 mg at 24 1009    folic acid (FOLVITE) tablet 1 mg  1 mg Oral Daily Alix Rey MD   1 mg at 24 1009    ipratropium 0.5 mg-albuterol 2.5 mg (DUONEB) nebulizer solution 1 Dose  1

## 2024-04-23 NOTE — ED NOTES
ED TO INPATIENT SBAR HANDOFF    Patient Name: Mey Lawrence   Preferred Name: Mey  : 1940  84 y.o.   Family/Caregiver Present: no   Code Status Order: Full Code  PO Status: NPO:No  Telemetry Order:   C-SSRS: Risk of Suicide: No Risk  Sitter no  n/aa  Restraints:     Sepsis Risk Score Sepsis Risk Score: 4.62    Situation  Chief Complaint   Patient presents with    Shortness of Breath     Brief Description of Patient's Condition:   Patient is stable at this time , Has some Sun-downing issues she is very determined to get up and use the restroom. She has a purewick in now.  Rodneyther stats that she usually doesn't have any issues with sleeping at night , But on her last hospital admit she was given something to help her her sleep .  Verbal orders were given Via Dr Thomson for Vistaril 25mg  PRN  Q6 .     Mental Status:  Baseline dementia , with issues with Sun-downing  Arrived from: Cancer Center  Imaging:   XR CHEST PORTABLE   Final Result   1. Probable left upper lobe pneumonia.   2. Increased, chronic, moderate, left pleural effusion.   3. Chronic left lower lobe collapse.        Abnormal labs:   Abnormal Labs Reviewed   CBC WITH AUTO DIFFERENTIAL - Abnormal; Notable for the following components:       Result Value    WBC 12.0 (*)     Hemoglobin 11.1 (*)     Hematocrit 33.4 (*)     Neutrophils % 82 (*)     Lymphocytes % 9 (*)     Neutrophils Absolute 9.6 (*)     Immature Granulocytes Absolute 0.1 (*)     All other components within normal limits   COMPREHENSIVE METABOLIC PANEL - Abnormal; Notable for the following components:    Sodium 134 (*)     Glucose 125 (*)     Albumin 2.8 (*)     Globulin 4.5 (*)     Albumin/Globulin Ratio 0.6 (*)     All other components within normal limits   BRAIN NATRIURETIC PEPTIDE - Abnormal; Notable for the following components:    NT Pro-BNP 1,277 (*)     All other components within normal limits       Background  Allergies: No Known Allergies  History:   Past Medical  History:   Diagnosis Date    Chronic obstructive pulmonary disease (HCC)     Hypertension     Hypothyroidism     Lung cancer (HCC)        Assessment  Vitals: MEWS Score: 2  Level of Consciousness: Alert (0)   Vitals:    04/22/24 1800 04/22/24 1830 04/22/24 1900 04/22/24 1936   BP: (!) 112/57 127/67 121/70    Pulse: 94 89 99 95   Resp: 27 24 26 28   Temp:       TempSrc:       SpO2: 100% 99% 100% 100%   Weight:       Height:         Deterioration Index (DI): Deterioration Index: 46.91  Deterioration Index (DI) Interventions Performed:    O2 Flow Rate: O2 Flow Rate (L/min): 2 L/min  O2 Device: O2 Device: Nasal cannula  Cardiac Rhythm: Cardiac Rhythm: Normal sinus rhythm  Critical Lab Results: [unfilled]  Cultures: Cultures:Blood  NIH Score: NIH     Active LDA's:   Peripheral IV 04/22/24 Left;Anterior Forearm (Active)   Site Assessment Clean, dry & intact 04/22/24 1717   Line Status Blood return noted;Brisk blood return;Flushed 04/22/24 1717   Dressing Status New dressing applied 04/22/24 1717   Dressing Type Transparent 04/22/24 1717     Active Central Lines:                          Active Wounds:    Active Dan's:    Active Feeding Tubes:      Administered Medications:   Medications   apixaban (ELIQUIS) tablet 2.5 mg (has no administration in time range)   budesonide (PULMICORT) nebulizer suspension 500 mcg (has no administration in time range)   ferrous sulfate (SLOW FE) extended release tablet 142 mg (has no administration in time range)   folic acid (FOLVITE) tablet 1 mg (has no administration in time range)   ipratropium 0.5 mg-albuterol 2.5 mg (DUONEB) nebulizer solution 1 Dose (has no administration in time range)   metoprolol succinate (TOPROL XL) extended release tablet 25 mg (has no administration in time range)   sodium chloride tablet 1 g (has no administration in time range)   levothyroxine (SYNTHROID) tablet 88 mcg (has no administration in time range)   torsemide (DEMADEX) tablet 5 mg (has no

## 2024-04-23 NOTE — CONSULTS
Nephrology Consultation          Patient: Mey Lawrence MRN: [id number]  SSN: [id number]    YOB: 1940  Age: 84 y.o.  Sex: female      Subjective:      Reason for the consultation.  Acute on chronic hyponatremia  History of present illness.  The patient is 84-year-old woman with underlying history of chronic hyponatremia due to SIADH from underlying lung cancer, on salt tablets at home, hypertension, hypothyroidism was hospitalized for increasing shortness of breath and hypoxic respiratory failure.  Chest x-ray reported right upper lobe infiltrate suggestive of pneumonia started on IV antibiotics.  She is currently on room air.  Her blood pressures are okay.  She is afebrile.  No noticed lower extremity swelling.    Past Medical History:   Diagnosis Date    Chronic obstructive pulmonary disease (HCC)     Hypertension     Hypothyroidism     Lung cancer (HCC)      No past surgical history on file.   No family history on file.  Social History     Tobacco Use    Smoking status: Never    Smokeless tobacco: Never   Substance Use Topics    Alcohol use: Never      Current Facility-Administered Medications   Medication Dose Route Frequency Provider Last Rate Last Admin    apixaban (ELIQUIS) tablet 2.5 mg  2.5 mg Oral BID Alix Rey MD   2.5 mg at 04/23/24 1010    budesonide (PULMICORT) nebulizer suspension 500 mcg  500 mcg Nebulization BID RT Alix Rey MD   500 mcg at 04/23/24 0742    ferrous sulfate (IRON 325) tablet 325 mg  325 mg Oral Daily Alix Rey MD   325 mg at 04/23/24 1009    folic acid (FOLVITE) tablet 1 mg  1 mg Oral Daily Alix Rey MD   1 mg at 04/23/24 1009    ipratropium 0.5 mg-albuterol 2.5 mg (DUONEB) nebulizer solution 1 Dose  1 Dose Inhalation Q4H PRN Alix Rey MD        metoprolol succinate (TOPROL XL) extended release tablet 25 mg  25 mg Oral Daily Alix Rey MD   25 mg at 04/23/24 1009    sodium chloride tablet 1 g  1 g Oral BID

## 2024-04-23 NOTE — PLAN OF CARE
Problem: Discharge Planning  Goal: Discharge to home or other facility with appropriate resources  Outcome: Progressing  Flowsheets (Taken 4/22/2024 8964)  Discharge to home or other facility with appropriate resources: Identify barriers to discharge with patient and caregiver     Problem: Skin/Tissue Integrity  Goal: Absence of new skin breakdown  Description: 1.  Monitor for areas of redness and/or skin breakdown  2.  Assess vascular access sites hourly  3.  Every 4-6 hours minimum:  Change oxygen saturation probe site  4.  Every 4-6 hours:  If on nasal continuous positive airway pressure, respiratory therapy assess nares and determine need for appliance change or resting period.  Outcome: Progressing     Problem: Safety - Adult  Goal: Free from fall injury  Outcome: Progressing     Problem: ABCDS Injury Assessment  Goal: Absence of physical injury  Outcome: Progressing

## 2024-04-23 NOTE — PROGRESS NOTES
4 Eyes Skin Assessment     NAME:  Mey Lawrence  YOB: 1940  MEDICAL RECORD NUMBER:  [id number]    The patient is being assessed for  Admission    I agree that at least one RN has performed a thorough Head to Toe Skin Assessment on the patient. ALL assessment sites listed below have been assessed.      Areas assessed by both nurses:    Head, Face, Ears, Shoulders, Back, Chest, Arms, Elbows, Hands, Sacrum. Buttock, Coccyx, Ischium, Legs. Feet and Heels, and Under Medical Devices         Does the Patient have a Wound? No noted wound(s)       Eriberto Prevention initiated by RN: Yes  Wound Care Orders initiated by RN: No    Pressure Injury (Stage 3,4, Unstageable, DTI, NWPT, and Complex wounds) if present, place Wound referral order by RN under : No    New Ostomies, if present place, Ostomy referral order under : No     Nurse 1 eSignature: Electronically signed by Francisco Palacio RN on 4/22/24 at 11:30 PM EDT    **SHARE this note so that the co-signing nurse can place an eSignature**    Nurse 2 eSignature: Electronically signed by Aren Ravi RN on 4/22/24 at 11:48 PM EDT

## 2024-04-23 NOTE — ED NOTES
Called Bed placement in regards to tele order, was told that there was no order for tele placed .  Patient will be taken up

## 2024-04-23 NOTE — ED NOTES
Daughter came out of room and states that her mother has Sun-downers  really bad and might need something to help her sleep

## 2024-04-23 NOTE — CONSULTS
nRBC 0.00 0.00 - 0.01 K/uL    Neutrophils % 82 (H) 32 - 75 %    Lymphocytes % 9 (L) 12 - 49 %    Monocytes % 8 5 - 13 %    Eosinophils % 1 0 - 7 %    Basophils % 0 0 - 1 %    Immature Granulocytes % 0 0 - 0.5 %    Neutrophils Absolute 9.6 (H) 1.8 - 8.0 K/UL    Lymphocytes Absolute 1.1 0.8 - 3.5 K/UL    Monocytes Absolute 1.0 0.0 - 1.0 K/UL    Eosinophils Absolute 0.2 0.0 - 0.4 K/UL    Basophils Absolute 0.0 0.0 - 0.1 K/UL    Immature Granulocytes Absolute 0.1 (H) 0.00 - 0.04 K/UL    Differential Type AUTOMATED     CMP    Collection Time: 04/22/24  5:17 PM   Result Value Ref Range    Sodium 134 (L) 136 - 145 mmol/L    Potassium 4.0 3.5 - 5.1 mmol/L    Chloride 98 97 - 108 mmol/L    CO2 29 21 - 32 mmol/L    Anion Gap 7 5 - 15 mmol/L    Glucose 125 (H) 65 - 100 mg/dL    BUN 16 6 - 20 mg/dL    Creatinine 0.84 0.55 - 1.02 mg/dL    Bun/Cre Ratio 19 12 - 20      Est, Glom Filt Rate 68 >60 ml/min/1.73m2    Calcium 9.6 8.5 - 10.1 mg/dL    Total Bilirubin 0.7 0.2 - 1.0 mg/dL    AST 16 15 - 37 U/L    ALT 14 12 - 78 U/L    Alk Phosphatase 64 45 - 117 U/L    Total Protein 7.3 6.4 - 8.2 g/dL    Albumin 2.8 (L) 3.5 - 5.0 g/dL    Globulin 4.5 (H) 2.0 - 4.0 g/dL    Albumin/Globulin Ratio 0.6 (L) 1.1 - 2.2     Troponin    Collection Time: 04/22/24  5:17 PM   Result Value Ref Range    Troponin, High Sensitivity 10 0 - 51 ng/L   Brain Natriuretic Peptide    Collection Time: 04/22/24  5:17 PM   Result Value Ref Range    NT Pro-BNP 1,277 (H) <450 pg/mL   POC Lactic Acid    Collection Time: 04/22/24  6:39 PM   Result Value Ref Range    POC Lactic Acid 0.90 0.40 - 2.00 mmol/L    Performed by: Lino Lane    Comprehensive Metabolic Panel w/ Reflex to MG    Collection Time: 04/23/24  5:50 AM   Result Value Ref Range    Sodium 134 (L) 136 - 145 mmol/L    Potassium 3.7 3.5 - 5.1 mmol/L    Chloride 99 97 - 108 mmol/L    CO2 27 21 - 32 mmol/L    Anion Gap 8 5 - 15 mmol/L    Glucose 131 (H) 65 - 100 mg/dL    BUN 20 6 - 20 mg/dL    Creatinine  Velocity 4 cm/s    LV E' Septal Velocity 5 cm/s    LV Ejection Fraction A2C 72 %    LV Ejection Fraction A4C 69 %    EF BP 71 55 - 100 %    LVOT Area 1.8 cm2    LA Minor Axis 4.8 cm    LA Major [place] 4.4 cm    LA Area 2C 9.4 cm2    LA Area 4C 12.0 cm2    LA Volume MOD A2C 16 (A) 22 - 52 mL    LA Volume MOD A4C 27 22 - 52 mL    LA Volume BP 21 (A) 22 - 52 mL    LA Diameter 2.3 cm    RA Area 4C 9.0 cm2    RA Volume 16 ml    Aortic Root 3.0 cm    Ascending Aorta 2.8 cm    IVC Expiration 1.5 cm    MV E Wave Deceleration Time 114.0 ms    MV A Velocity 1.68 m/s    MV E Velocity 1.38 m/s    MV Mean Gradient 7 mmHg    MV VTI 24.5 cm    MV Mean Velocity 1.2 m/s    MV Max Velocity 1.8 m/s    MV Peak Gradient 13 mmHg    WA Max Velocity 1.0 m/s    Pulmonary Artery EDP 4 mmHg    PV .0 ms    PV Max Velocity 1.4 m/s    PV Peak Gradient 8 mmHg    RV Basal Dimension 3.0 cm    RV Free Wall Peak S' 23 cm/s    TAPSE 1.9 1.7 cm    TR Max Velocity 2.50 m/s    TR Peak Gradient 25 mmHg    Body Surface Area 1.44 m2    Fractional Shortening 2D 38 28 - 44 %    LV ESV Index A4C 9 mL/m2    LV EDV Index A4C 30 mL/m2    LV ESV Index A2C 8 mL/m2    LV EDV Index A2C 27 mL/m2    LVIDd Index 2.34 cm/m2    LVIDs Index 1.45 cm/m2    LV RWT Ratio 0.71     LV Mass 2D 106.3 67 - 162 g    LV Mass 2D Index 73.3 43 - 95 g/m2    MV E/A 0.82     E/E' Ratio (Averaged) 31.05     E/E' Lateral 34.50     LA Volume Index BP 14 (A) 16 - 34 ml/m2    LA Volume Index MOD A2C 11 (A) 16 - 34 ml/m2    LA Volume Index MOD A4C 19 16 - 34 ml/m2    LA Size Index 1.59 cm/m2    LA/AO Root Ratio 0.77     RA Volume Index A4C 11 mL/m2    Ao Root Index 2.07 cm/m2    Ascending Aorta Index 1.93 cm/m2      XR CHEST PORTABLE 4/22/24  IMPRESSION:  1. Probable left upper lobe pneumonia.  2. Increased, chronic, moderate, left pleural effusion.  3. Chronic left lower lobe collapse.    Assessment and Plan:     Recurrent Non-small Cell Lung Cancer  -follow with Dr. Grullon last seen

## 2024-04-24 LAB
ALBUMIN SERPL-MCNC: 2.3 G/DL (ref 3.5–5)
ALBUMIN/GLOB SERPL: 0.5 (ref 1.1–2.2)
ALP SERPL-CCNC: 55 U/L (ref 45–117)
ALT SERPL-CCNC: 14 U/L (ref 12–78)
ANION GAP SERPL CALC-SCNC: 6 MMOL/L (ref 5–15)
AST SERPL W P-5'-P-CCNC: 16 U/L (ref 15–37)
BILIRUB SERPL-MCNC: 0.2 MG/DL (ref 0.2–1)
BNP SERPL-MCNC: 1807 PG/ML
BUN SERPL-MCNC: 28 MG/DL (ref 6–20)
BUN/CREAT SERPL: 32 (ref 12–20)
CA-I BLD-MCNC: 8.9 MG/DL (ref 8.5–10.1)
CHLORIDE SERPL-SCNC: 101 MMOL/L (ref 97–108)
CO2 SERPL-SCNC: 30 MMOL/L (ref 21–32)
CREAT SERPL-MCNC: 0.87 MG/DL (ref 0.55–1.02)
ERYTHROCYTE [DISTWIDTH] IN BLOOD BY AUTOMATED COUNT: 14.5 % (ref 11.5–14.5)
GLOBULIN SER CALC-MCNC: 4.4 G/DL (ref 2–4)
GLUCOSE SERPL-MCNC: 144 MG/DL (ref 65–100)
HCT VFR BLD AUTO: 29 % (ref 35–47)
HGB BLD-MCNC: 9.7 G/DL (ref 11.5–16)
MCH RBC QN AUTO: 28.5 PG (ref 26–34)
MCHC RBC AUTO-ENTMCNC: 33.4 G/DL (ref 30–36.5)
MCV RBC AUTO: 85.3 FL (ref 80–99)
NRBC # BLD: 0 K/UL (ref 0–0.01)
NRBC BLD-RTO: 0 PER 100 WBC
PLATELET # BLD AUTO: 298 K/UL (ref 150–400)
PMV BLD AUTO: 10.4 FL (ref 8.9–12.9)
POTASSIUM SERPL-SCNC: 3.2 MMOL/L (ref 3.5–5.1)
PROT SERPL-MCNC: 6.7 G/DL (ref 6.4–8.2)
RBC # BLD AUTO: 3.4 M/UL (ref 3.8–5.2)
SODIUM SERPL-SCNC: 137 MMOL/L (ref 136–145)
WBC # BLD AUTO: 12.4 K/UL (ref 3.6–11)

## 2024-04-24 PROCEDURE — 94761 N-INVAS EAR/PLS OXIMETRY MLT: CPT

## 2024-04-24 PROCEDURE — 2700000000 HC OXYGEN THERAPY PER DAY

## 2024-04-24 PROCEDURE — 6370000000 HC RX 637 (ALT 250 FOR IP): Performed by: FAMILY MEDICINE

## 2024-04-24 PROCEDURE — 1100000000 HC RM PRIVATE

## 2024-04-24 PROCEDURE — 36415 COLL VENOUS BLD VENIPUNCTURE: CPT

## 2024-04-24 PROCEDURE — 6360000002 HC RX W HCPCS: Performed by: FAMILY MEDICINE

## 2024-04-24 PROCEDURE — 85027 COMPLETE CBC AUTOMATED: CPT

## 2024-04-24 PROCEDURE — 2580000003 HC RX 258: Performed by: FAMILY MEDICINE

## 2024-04-24 PROCEDURE — 80053 COMPREHEN METABOLIC PANEL: CPT

## 2024-04-24 PROCEDURE — 83880 ASSAY OF NATRIURETIC PEPTIDE: CPT

## 2024-04-24 PROCEDURE — 94640 AIRWAY INHALATION TREATMENT: CPT

## 2024-04-24 RX ORDER — POTASSIUM CHLORIDE 750 MG/1
40 TABLET, FILM COATED, EXTENDED RELEASE ORAL ONCE
Status: DISCONTINUED | OUTPATIENT
Start: 2024-04-24 | End: 2024-04-26 | Stop reason: HOSPADM

## 2024-04-24 RX ORDER — SODIUM CHLORIDE 1 G/1
1 TABLET ORAL DAILY
Status: DISCONTINUED | OUTPATIENT
Start: 2024-04-25 | End: 2024-04-26

## 2024-04-24 RX ORDER — POTASSIUM CHLORIDE 20 MEQ/1
40 TABLET, EXTENDED RELEASE ORAL ONCE
Status: DISCONTINUED | OUTPATIENT
Start: 2024-04-24 | End: 2024-04-26 | Stop reason: HOSPADM

## 2024-04-24 RX ADMIN — FOLIC ACID 1 MG: 1 TABLET ORAL at 07:51

## 2024-04-24 RX ADMIN — TORSEMIDE 5 MG: 10 TABLET ORAL at 07:51

## 2024-04-24 RX ADMIN — IPRATROPIUM BROMIDE AND ALBUTEROL SULFATE 1 DOSE: .5; 2.5 SOLUTION RESPIRATORY (INHALATION) at 21:36

## 2024-04-24 RX ADMIN — IPRATROPIUM BROMIDE AND ALBUTEROL SULFATE 1 DOSE: .5; 2.5 SOLUTION RESPIRATORY (INHALATION) at 11:48

## 2024-04-24 RX ADMIN — IPRATROPIUM BROMIDE AND ALBUTEROL SULFATE 1 DOSE: .5; 2.5 SOLUTION RESPIRATORY (INHALATION) at 16:27

## 2024-04-24 RX ADMIN — APIXABAN 2.5 MG: 2.5 TABLET, FILM COATED ORAL at 20:31

## 2024-04-24 RX ADMIN — CEFTRIAXONE SODIUM 1000 MG: 1 INJECTION, POWDER, FOR SOLUTION INTRAMUSCULAR; INTRAVENOUS at 20:31

## 2024-04-24 RX ADMIN — METOPROLOL SUCCINATE 25 MG: 25 TABLET, FILM COATED, EXTENDED RELEASE ORAL at 07:51

## 2024-04-24 RX ADMIN — HYDROXYZINE HYDROCHLORIDE 25 MG: 25 INJECTION, SOLUTION INTRAMUSCULAR at 20:32

## 2024-04-24 RX ADMIN — SODIUM CHLORIDE, PRESERVATIVE FREE 10 ML: 5 INJECTION INTRAVENOUS at 20:32

## 2024-04-24 RX ADMIN — FERROUS SULFATE TAB 325 MG (65 MG ELEMENTAL FE) 325 MG: 325 (65 FE) TAB at 07:51

## 2024-04-24 RX ADMIN — METHYLPREDNISOLONE SODIUM SUCCINATE 40 MG: 40 INJECTION INTRAMUSCULAR; INTRAVENOUS at 02:20

## 2024-04-24 RX ADMIN — BUDESONIDE 500 MCG: 0.5 INHALANT ORAL at 21:36

## 2024-04-24 RX ADMIN — AZITHROMYCIN MONOHYDRATE 500 MG: 500 INJECTION, POWDER, LYOPHILIZED, FOR SOLUTION INTRAVENOUS at 20:31

## 2024-04-24 RX ADMIN — METHYLPREDNISOLONE SODIUM SUCCINATE 40 MG: 40 INJECTION INTRAMUSCULAR; INTRAVENOUS at 12:24

## 2024-04-24 RX ADMIN — IPRATROPIUM BROMIDE AND ALBUTEROL SULFATE 1 DOSE: .5; 2.5 SOLUTION RESPIRATORY (INHALATION) at 08:03

## 2024-04-24 RX ADMIN — BUDESONIDE 500 MCG: 0.5 INHALANT ORAL at 08:05

## 2024-04-24 RX ADMIN — METHYLPREDNISOLONE SODIUM SUCCINATE 40 MG: 40 INJECTION INTRAMUSCULAR; INTRAVENOUS at 06:40

## 2024-04-24 RX ADMIN — APIXABAN 2.5 MG: 2.5 TABLET, FILM COATED ORAL at 07:51

## 2024-04-24 RX ADMIN — LEVOTHYROXINE SODIUM 88 MCG: 0.09 TABLET ORAL at 06:40

## 2024-04-24 RX ADMIN — METHYLPREDNISOLONE SODIUM SUCCINATE 40 MG: 40 INJECTION INTRAMUSCULAR; INTRAVENOUS at 18:25

## 2024-04-24 RX ADMIN — POTASSIUM BICARBONATE 40 MEQ: 782 TABLET, EFFERVESCENT ORAL at 12:24

## 2024-04-24 RX ADMIN — Medication 1 G: at 07:51

## 2024-04-24 ASSESSMENT — ENCOUNTER SYMPTOMS
GASTROINTESTINAL NEGATIVE: 1
COUGH: 1
SHORTNESS OF BREATH: 1

## 2024-04-24 NOTE — PROGRESS NOTES
Hematology/Oncology   Progress Note    Patient: Mey Lawrence MRN: [id number]     YOB: 1940  Age: 84 y.o.  Sex: female      Admit Date: 4/22/2024    LOS: 2 days     Chief Complaint: SOB    Subjective:     Patient resting in chair comfortably, NAD. No complaints at time of visit; denies chest pain, palpitations.    Objective:     Vitals:    04/24/24 0801 04/24/24 0808 04/24/24 1159 04/24/24 1538   BP:  115/72  115/63   Pulse:  (!) 103  (!) 107   Resp:  21 18   Temp:  97.5 °F (36.4 °C)  97.9 °F (36.6 °C)   TempSrc:  Oral  Oral   SpO2: 100% 99%  95%   Weight:       Height:   1.575 m (5' 2.01\")       Physical Exam:  Constitutional Alert, cooperative, oriented. Mood and affect appropriate.    Head Normocephalic; no scars   Eyes Conjunctivae and sclerae are clear and without icterus. Pupils are reactive and equal.   Neck Supple without masses or thyromegaly. No jugular venous distension.   Hematologic/Lymphatic No petechiae or purpura.   Respiratory Lungs are clear to auscultation   Cardiovascular Regular rate and rhythm of heart    Chest / Line Site Chest is symmetric with no chest wall deformities.   Abdomen Non-tender, non-distended, no masses, ascites or hepatosplenomegaly. Good bowel sounds. No guarding or rebound tenderness.    Musculoskeletal No tenderness or swelling   Extremities No visible deformities or edema.    Skin No rashes, scars, or lesions suggestive of malignancy.    Neurologic Alert and confused. Coherent speech. Verbalizes understanding of our discussions today     Lab/Data Review:  Recent Labs     04/22/24 1717 04/23/24  0550 04/24/24  0501   WBC 12.0* 11.7* 12.4*   HGB 11.1* 11.8 9.7*   HCT 33.4* 35.0 29.0*    347 298     Recent Labs     04/22/24 1717 04/23/24  0550 04/24/24  0501   * 134* 137   K 4.0 3.7 3.2*   CL 98 99 101   CO2 29 27 30   BUN 16 20 28*   ALT 14 15 14     No results for input(s): \"PH\", \"PCO2\", \"PO2\", \"HCO3\", \"FIO2\" in the last 72  is requiring recurrent thoracentesis as well.    -CT reviewed on admission. Likely acute worsening of her hypoxia and so appear to be related to pneumonia and CHF exacerbation. She has no progression at this time.      Iron deficiency Anemia  -Hgb low end of normal, continue to monitor  -Had iron infusion 4/22 in office  -continue PO iron supplementation      CAP  -Chest Xray reviewed  -continue treatment per primary team     Acute respiratory failure hypoxic  COPD  CHF  -pulmonary consulted  -on nebulizer treatments, Symbicort, and IV steroids  -on room air at time of visit   -BNP elevated; known CHF with low EF, continue torsemide.     Signed By: Raquel Stapleton, APRN - CNP     April 24, 2024

## 2024-04-24 NOTE — PROGRESS NOTES
4 Eyes Skin Assessment     NAME:  Mey Lawrence  YOB: 1940  MEDICAL RECORD NUMBER:  [id number]    The patient is being assessed for  Other weekly    I agree that at least one RN has performed a thorough Head to Toe Skin Assessment on the patient. ALL assessment sites listed below have been assessed.      Areas assessed by both nurses:    Head, Face, Ears, Shoulders, Back, Chest, Arms, Elbows, Hands, Sacrum. Buttock, Coccyx, Ischium, and Legs. Feet and Heels        Does the Patient have a Wound? No noted wound(s)       Eriberto Prevention initiated by RN: No  Wound Care Orders initiated by RN: No    Pressure Injury (Stage 3,4, Unstageable, DTI, NWPT, and Complex wounds) if present, place Wound referral order by RN under : No    New Ostomies, if present place, Ostomy referral order under : No     Nurse 1 eSignature: Electronically signed by Rosy Sousa RN on 4/24/24 at 4:09 AM EDT    **SHARE this note so that the co-signing nurse can place an eSignature**    Nurse 2 eSignature: Electronically signed by Aren Ravi RN on 4/24/24 at 4:16 AM EDT

## 2024-04-24 NOTE — CONSULTS
Comprehensive Nutrition Assessment    Type and Reason for Visit:  Initial, Consult (Weight Loss)    Nutrition Recommendations/Plan:   Recommend initiate ONSP: Ensure Enlive 240 ml daily at lunch  Monitor po intake, weight and labs     Malnutrition Assessment:  Malnutrition Status:  Severe malnutrition (04/24/24 1156)    Context:  Chronic Illness     Findings of the 6 clinical characteristics of malnutrition:  Energy Intake:     Weight Loss:  Greater than 10% over 6 months     Body Fat Loss:  Severe body fat loss Triceps, Buccal region   Muscle Mass Loss:  Severe muscle mass loss Temples (temporalis), Clavicles (pectoralis & deltoids), Hand (interosseous)  Fluid Accumulation:  No significant fluid accumulation     Strength:  Not Performed    Nutrition Assessment:    Patient admitted with CAP, has history of Ca. Upon visit patient was eating small cup of fruit, asked patient about weight loss, patient stated that she wanted to loss weight,recalls UBW at 135lbs. discussed need for good po intake and no further weight loss, patient stated she would eat better once she was at home. Patient did state that she drinks Ensure 1 a day. Patient agreeable to Ensure daily. Current weight is down 22% from UBW, current weight is at low BMI for age. Diet is adequate for nutritional needs, intake not documented, patient ate 0% of breakfast.    Nutrition Related Findings:    NFPE completed, no chewing,swallowing difficulties. No N/V/D Wound Type: None       Current Nutrition Intake & Therapies:    Average Meal Intake: 0%  Average Supplements Intake: None Ordered  ADULT DIET; Regular; Low Fat/Low Chol/High Fiber/2 gm Na    Anthropometric Measures:  Height: 157.5 cm (5' 2.01\")  Ideal Body Weight (IBW): 110 lbs (50 kg)    Admission Body Weight: 47.6 kg (104 lb 15 oz)  Current Body Weight: 47.6 kg (104 lb 15 oz), 95.4 % IBW. Weight Source: Stated  Current BMI (kg/m2): 19.2  Usual Body Weight: 61.4 kg (135 lb 5.8 oz)  % Weight Change

## 2024-04-24 NOTE — PROGRESS NOTES
Walked short distance with patient on RA and Spo2 down to 88% briefly and back up to 90%. Had patient do some bed exercises and Spo2 still did not drop any lower. After walk patient Spo2 came up to 94%. I suggest PT walk with patient a little further to see if she will drop below 88%. Patient was a little unstable during walk.

## 2024-04-24 NOTE — PROGRESS NOTES
04/24/24 0907   Resting (Room Air)   SpO2 95   During Walk (Room Air)   SpO2 88   Walk/Assistance Device Ambulation   Symptoms Shortness of breath   After Walk   SpO2 94   O2 Device Other (comment)  (Room air)   Does the Patient Qualify for Home O2 No

## 2024-04-24 NOTE — PROGRESS NOTES
04/24/24 0907   Resting (Room Air)   SpO2 95   During Walk (Room Air)   SpO2 88   Walk/Assistance Device Ambulation   Symptoms Shortness of breath   After Walk   SpO2 94   O2 Device Other (comment)  (Room air)   Does the Patient Qualify for Home O2 Yes

## 2024-04-24 NOTE — PROGRESS NOTES
History and Physical    NAME:   Mey Lawrence   :  1940   MRN:  [id number]     Date/Time: 2024 8:13 AM    Patient PCP: Bela Jolley MD  ______________________________________________________________________       Subjective:     CHIEF COMPLAINT:       Shortness of breath        HISTORY OF PRESENT ILLNESS:       Patient is a 84 y.o. year old female past medical history of lung cancer status post history of COPD hypertension congestive heart failure last treatment was in 2024 came to emergency room complaining of shortness of breath  And was at [facility] for iron infusion.  Patient developed shortness of breath saturation drops patient having shortness of breath for little exertion for a few weeks    Seen by the ER physician workup done including the chest x-ray shows left upper lobe pneumonia increased chronic moderate left pleural effusion and chronic left lung collapse    Patient was admitted for further evaluation and treatment        Patient feeling much better this morning  BNP 1411 chest x-ray shows right upper lobe pneumonia        Patient alert awake very confused last night back to baseline this morning  Potassium today 3.2  Cultures so far negative  Patient have echo done showed ejection fraction was 65 to 70% normal wall motion    Past Medical History:   Diagnosis Date    Chronic obstructive pulmonary disease (HCC)     Hypertension     Hypothyroidism     Lung cancer (HCC)         No past surgical history on file.    Social History     Tobacco Use    Smoking status: Never    Smokeless tobacco: Never   Substance Use Topics    Alcohol use: Never        No family history on file.    Allergies   Allergen Reactions    Banana Rash        Prior to Admission medications    Medication Sig Start Date End Date Taking? Authorizing Provider   torsemide (DEMADEX) 5 MG tablet Take 1 tablet by mouth daily    Provider, MD Tyler   metoprolol succinate (TOPROL XL) 25 MG extended  sulfate 2000 mg in 50 mL IVPB premix, 2,000 mg, IntraVENous, PRN, Alix Rey MD    ondansetron (ZOFRAN-ODT) disintegrating tablet 4 mg, 4 mg, Oral, Q8H PRN **OR** ondansetron (ZOFRAN) injection 4 mg, 4 mg, IntraVENous, Q6H PRN, Alix Rey MD    polyethylene glycol (GLYCOLAX) packet 17 g, 17 g, Oral, Daily PRN, Alix Rey MD    acetaminophen (TYLENOL) tablet 650 mg, 650 mg, Oral, Q6H PRN **OR** acetaminophen (TYLENOL) suppository 650 mg, 650 mg, Rectal, Q6H PRN, Alix Rey MD    azithromycin (ZITHROMAX) 500 mg in sodium chloride 0.9 % 250 mL IVPB ([id number]), 500 mg, IntraVENous, Q24H, Alix Rey MD, Stopped at 04/23/24 2150    cefTRIAXone (ROCEPHIN) 1,000 mg in sterile water 10 mL IV syringe, 1,000 mg, IntraVENous, Q24H, Alix Rey MD, 1,000 mg at 04/23/24 2029    methylPREDNISolone sodium succ (SOLU-MEDROL) injection 40 mg, 40 mg, IntraVENous, Q6H, Alix Rey MD, 40 mg at 04/24/24 0640    ipratropium 0.5 mg-albuterol 2.5 mg (DUONEB) nebulizer solution 1 Dose, 1 Dose, Inhalation, Q4H WA RT, Alix Rey MD, 1 Dose at 04/24/24 0803    [Held by provider] hydrOXYzine pamoate (VISTARIL) capsule 25 mg, 25 mg, Oral, Q6H PRN, Alix Rey MD    LAB DATA REVIEWED:    Recent Results (from the past 24 hour(s))   Echo (TTE) complete (PRN contrast/bubble/strain/3D)    Collection Time: 04/23/24  9:52 AM   Result Value Ref Range    LV EDV A2C 39 mL    LV EDV A4C 43 mL    LV ESV A2C 11 mL    LV ESV A4C 13 mL    IVSd 0.9 0.6 - 0.9 cm    LVIDd 3.4 (A) 3.9 - 5.3 cm    LVIDs 2.1 cm    LVOT Diameter 1.5 cm    LVPWd 1.2 (A) 0.6 - 0.9 cm    LV E' Lateral Velocity 4 cm/s    LV E' Septal Velocity 5 cm/s    LV Ejection Fraction A2C 72 %    LV Ejection Fraction A4C 69 %    EF BP 71 55 - 100 %    LVOT Area 1.8 cm2    LA Minor Axis 4.8 cm    LA Major [place] 4.4 cm    LA Area 2C 9.4 cm2    LA Area 4C 12.0 cm2    LA Volume MOD A2C 16 (A) 22 - 52 mL    LA Volume MOD A4C 27 22 - 52 mL    LA

## 2024-04-24 NOTE — PLAN OF CARE
Problem: Nutrition Deficit:  Goal: Optimize nutritional status  Outcome: Progressing  Flowsheets (Taken 4/24/2024 1220)  Nutrient intake appropriate for improving, restoring, or maintaining nutritional needs:   Assess nutritional status and recommend course of action   Recommend appropriate diets, oral nutritional supplements, and vitamin/mineral supplements

## 2024-04-24 NOTE — PROGRESS NOTES
Nephrology follow up          Patient: Mey Lawrence MRN: [id number]  SSN: [id number]    YOB: 1940  Age: 84 y.o.  Sex: female      Subjective:   The patient is seen at the bedside  She is sitting on the chair  She looks comfortable  Sodium has improved to 137  K is 3.2.   Past Medical History:   Diagnosis Date    Chronic obstructive pulmonary disease (HCC)     Hypertension     Hypothyroidism     Lung cancer (HCC)      No past surgical history on file.   No family history on file.  Social History     Tobacco Use    Smoking status: Never    Smokeless tobacco: Never   Substance Use Topics    Alcohol use: Never      Current Facility-Administered Medications   Medication Dose Route Frequency Provider Last Rate Last Admin    potassium chloride (KLOR-CON) extended release tablet 40 mEq  40 mEq Oral Once Alix Rey MD        hydrOXYzine (VISTARIL) injection 25 mg  25 mg IntraMUSCular Q6H PRN Alix Rey MD   25 mg at 04/23/24 2030    apixaban (ELIQUIS) tablet 2.5 mg  2.5 mg Oral BID Alix Rey MD   2.5 mg at 04/24/24 0751    budesonide (PULMICORT) nebulizer suspension 500 mcg  500 mcg Nebulization BID RT Alix Rey MD   500 mcg at 04/24/24 0805    ferrous sulfate (IRON 325) tablet 325 mg  325 mg Oral Daily Alix Rey MD   325 mg at 04/24/24 0751    folic acid (FOLVITE) tablet 1 mg  1 mg Oral Daily Alix Rey MD   1 mg at 04/24/24 0751    ipratropium 0.5 mg-albuterol 2.5 mg (DUONEB) nebulizer solution 1 Dose  1 Dose Inhalation Q4H PRN Alix Rey MD        metoprolol succinate (TOPROL XL) extended release tablet 25 mg  25 mg Oral Daily Alix Rey MD   25 mg at 04/24/24 0751    sodium chloride tablet 1 g  1 g Oral BID WC Alxi Rey MD   1 g at 04/24/24 0751    levothyroxine (SYNTHROID) tablet 88 mcg  88 mcg Oral Daily Alix Rey MD   88 mcg at 04/24/24 0640    torsemide (DEMADEX) tablet 5 mg  5 mg Oral Daily Alix Rey MD

## 2024-04-24 NOTE — PROGRESS NOTES
Alix Rey MD   325 mg at 04/24/24 0751    folic acid (FOLVITE) tablet 1 mg  1 mg Oral Daily Alix Rey MD   1 mg at 04/24/24 0751    ipratropium 0.5 mg-albuterol 2.5 mg (DUONEB) nebulizer solution 1 Dose  1 Dose Inhalation Q4H PRN Alix Rey MD        metoprolol succinate (TOPROL XL) extended release tablet 25 mg  25 mg Oral Daily Alix Rey MD   25 mg at 04/24/24 0751    sodium chloride tablet 1 g  1 g Oral BID WC Alix Rey MD   1 g at 04/24/24 0751    levothyroxine (SYNTHROID) tablet 88 mcg  88 mcg Oral Daily Alix Rey MD   88 mcg at 04/24/24 0640    torsemide (DEMADEX) tablet 5 mg  5 mg Oral Daily Alix Rey MD   5 mg at 04/24/24 0751    sodium chloride flush 0.9 % injection 5-40 mL  5-40 mL IntraVENous 2 times per day Alix Rey MD   10 mL at 04/23/24 2031    sodium chloride flush 0.9 % injection 5-40 mL  5-40 mL IntraVENous PRN Alix Rey MD        0.9 % sodium chloride infusion   IntraVENous PRN Alix Rey MD        potassium chloride (KLOR-CON M) extended release tablet 40 mEq  40 mEq Oral PRN Alix Rey MD        Or    potassium bicarb-citric acid (EFFER-K) effervescent tablet 40 mEq  40 mEq Oral PRN Alix Rey MD        Or    potassium chloride 10 mEq/100 mL IVPB (Peripheral Line)  10 mEq IntraVENous PRN Alix Rey MD        magnesium sulfate 2000 mg in 50 mL IVPB premix  2,000 mg IntraVENous PRN Alix Rey MD        ondansetron (ZOFRAN-ODT) disintegrating tablet 4 mg  4 mg Oral Q8H PRN Alix Rey MD        Or    ondansetron (ZOFRAN) injection 4 mg  4 mg IntraVENous Q6H PRN Alix Rey MD        polyethylene glycol (GLYCOLAX) packet 17 g  17 g Oral Daily PRN Alix Rey MD        acetaminophen (TYLENOL) tablet 650 mg  650 mg Oral Q6H PRN Alix Rey MD        Or    acetaminophen (TYLENOL) suppository 650 mg  650 mg Rectal Q6H PRN Alix Rey MD        azithromycin (ZITHROMAX)  0500  Gross per 24 hour   Intake 1010 ml   Output --   Net 1010 ml         Last shift:      No intake/output data recorded.  Last 3 shifts: 04/22 1901 - 04/24 0700  In: 1290 [P.O.:1000; I.V.:10]  Out: -        Physical Exam:     Physical Exam  Constitutional:       Appearance: Normal appearance.   HENT:      Head: Normocephalic and atraumatic.      Nose: Nose normal.      Mouth/Throat:      Mouth: Mucous membranes are moist.   Eyes:      Pupils: Pupils are equal, round, and reactive to light.   Cardiovascular:      Rate and Rhythm: Normal rate and regular rhythm.      Pulses: Normal pulses.      Heart sounds: Normal heart sounds.   Pulmonary:      Effort: Pulmonary effort is normal.   Abdominal:      General: Abdomen is flat. Bowel sounds are normal.      Palpations: Abdomen is soft.   Musculoskeletal:      Cervical back: Normal range of motion and neck supple.   Skin:     General: Skin is warm.   Neurological:      General: No focal deficit present.      Mental Status: She is alert.          Labs:    Recent Labs     04/22/24 1717 04/23/24  0550 04/24/24  0501   WBC 12.0* 11.7* 12.4*   HGB 11.1* 11.8 9.7*    347 298       Recent Labs     04/22/24 1717 04/23/24  0550 04/24/24  0501   * 134* 137   K 4.0 3.7 3.2*   CL 98 99 101   CO2 29 27 30   GLUCOSE 125* 131* 144*   BUN 16 20 28*   CREATININE 0.84 0.82 0.87   CALCIUM 9.6 10.0 8.9   BILITOT 0.7 0.4 0.2   AST 16 16 16   ALT 14 15 14       No results for input(s): \"PH\", \"PCO2\", \"PO2\", \"HCO3\", \"FIO2\" in the last 72 hours.  Recent Labs     04/22/24 1717 04/23/24  0550 04/24/24  0501   NTPROBNP 1,277* 1,411* 1,807*       Lab Results   Component Value Date/Time    BNP 1,205 03/11/2023 10:45 AM    NTPROBNP 1,807 04/24/2024 05:01 AM      Lab Results   Component Value Date/Time    TSH 11.500 03/13/2024 12:00 AM       Results       Procedure Component Value Units Date/Time    Culture, Blood 1 [[id number]] Collected: 04/23/24 0550    Order Status: Completed

## 2024-04-25 ENCOUNTER — APPOINTMENT (OUTPATIENT)
Facility: HOSPITAL | Age: 84
End: 2024-04-25
Payer: MEDICARE

## 2024-04-25 LAB
ALBUMIN SERPL-MCNC: 2.7 G/DL (ref 3.5–5)
ALBUMIN/GLOB SERPL: 0.6 (ref 1.1–2.2)
ALP SERPL-CCNC: 60 U/L (ref 45–117)
ALT SERPL-CCNC: 21 U/L (ref 12–78)
ANION GAP SERPL CALC-SCNC: 5 MMOL/L (ref 5–15)
ARTERIAL PATENCY WRIST A: YES
AST SERPL W P-5'-P-CCNC: 22 U/L (ref 15–37)
BASE EXCESS BLDA CALC-SCNC: 7.4 MMOL/L (ref 0–3)
BDY SITE: ABNORMAL
BILIRUB SERPL-MCNC: 0.3 MG/DL (ref 0.2–1)
BODY TEMPERATURE: 98.6
BUN SERPL-MCNC: 28 MG/DL (ref 6–20)
BUN/CREAT SERPL: 26 (ref 12–20)
CA-I BLD-MCNC: 9.2 MG/DL (ref 8.5–10.1)
CHLORIDE SERPL-SCNC: 103 MMOL/L (ref 97–108)
CO2 SERPL-SCNC: 29 MMOL/L (ref 21–32)
COHGB MFR BLD: 0.3 % (ref 1–2)
CREAT SERPL-MCNC: 1.06 MG/DL (ref 0.55–1.02)
EKG ATRIAL RATE: 120 BPM
EKG DIAGNOSIS: NORMAL
EKG P AXIS: 90 DEGREES
EKG P-R INTERVAL: 132 MS
EKG Q-T INTERVAL: 334 MS
EKG QRS DURATION: 78 MS
EKG QTC CALCULATION (BAZETT): 472 MS
EKG R AXIS: 75 DEGREES
EKG T AXIS: 83 DEGREES
EKG VENTRICULAR RATE: 120 BPM
ERYTHROCYTE [DISTWIDTH] IN BLOOD BY AUTOMATED COUNT: 14.6 % (ref 11.5–14.5)
FIO2 ON VENT: 28 %
GAS FLOW.O2 O2 DELIVERY SYS: 2 L/MIN
GLOBULIN SER CALC-MCNC: 4.7 G/DL (ref 2–4)
GLUCOSE SERPL-MCNC: 101 MG/DL (ref 65–100)
HCO3 BLDA-SCNC: 32 MMOL/L (ref 22–26)
HCT VFR BLD AUTO: 31.8 % (ref 35–47)
HGB BLD-MCNC: 10.6 G/DL (ref 11.5–16)
MCH RBC QN AUTO: 28.7 PG (ref 26–34)
MCHC RBC AUTO-ENTMCNC: 33.3 G/DL (ref 30–36.5)
MCV RBC AUTO: 86.2 FL (ref 80–99)
METHGB MFR BLD: 0.3 % (ref 0–1.4)
NRBC # BLD: 0 K/UL (ref 0–0.01)
NRBC BLD-RTO: 0 PER 100 WBC
OXYHGB MFR BLD: 97.4 % (ref 95–99)
PCO2 BLDA: 43 MMHG (ref 35–45)
PERFORMED BY:: ABNORMAL
PH BLDA: 7.49 (ref 7.35–7.45)
PLATELET # BLD AUTO: 370 K/UL (ref 150–400)
PMV BLD AUTO: 10.2 FL (ref 8.9–12.9)
PO2 BLDA: 126 MMHG (ref 80–100)
POTASSIUM SERPL-SCNC: 3.8 MMOL/L (ref 3.5–5.1)
PROT SERPL-MCNC: 7.4 G/DL (ref 6.4–8.2)
RBC # BLD AUTO: 3.69 M/UL (ref 3.8–5.2)
SAO2 % BLD: 98 % (ref 95–99)
SAO2% DEVICE SAO2% SENSOR NAME: ABNORMAL
SODIUM SERPL-SCNC: 137 MMOL/L (ref 136–145)
SPECIMEN SITE: ABNORMAL
WBC # BLD AUTO: 12.2 K/UL (ref 3.6–11)

## 2024-04-25 PROCEDURE — 6360000002 HC RX W HCPCS: Performed by: FAMILY MEDICINE

## 2024-04-25 PROCEDURE — 71045 X-RAY EXAM CHEST 1 VIEW: CPT

## 2024-04-25 PROCEDURE — 82803 BLOOD GASES ANY COMBINATION: CPT

## 2024-04-25 PROCEDURE — 6370000000 HC RX 637 (ALT 250 FOR IP): Performed by: INTERNAL MEDICINE

## 2024-04-25 PROCEDURE — 85027 COMPLETE CBC AUTOMATED: CPT

## 2024-04-25 PROCEDURE — 94640 AIRWAY INHALATION TREATMENT: CPT

## 2024-04-25 PROCEDURE — 80053 COMPREHEN METABOLIC PANEL: CPT

## 2024-04-25 PROCEDURE — 6370000000 HC RX 637 (ALT 250 FOR IP): Performed by: FAMILY MEDICINE

## 2024-04-25 PROCEDURE — 94761 N-INVAS EAR/PLS OXIMETRY MLT: CPT

## 2024-04-25 PROCEDURE — 97165 OT EVAL LOW COMPLEX 30 MIN: CPT

## 2024-04-25 PROCEDURE — 36415 COLL VENOUS BLD VENIPUNCTURE: CPT

## 2024-04-25 PROCEDURE — 97530 THERAPEUTIC ACTIVITIES: CPT

## 2024-04-25 PROCEDURE — 36600 WITHDRAWAL OF ARTERIAL BLOOD: CPT

## 2024-04-25 PROCEDURE — 93005 ELECTROCARDIOGRAM TRACING: CPT | Performed by: FAMILY MEDICINE

## 2024-04-25 PROCEDURE — 2580000003 HC RX 258: Performed by: FAMILY MEDICINE

## 2024-04-25 PROCEDURE — 1100000000 HC RM PRIVATE

## 2024-04-25 RX ORDER — HYDROXYZINE PAMOATE 25 MG/1
25 CAPSULE ORAL 3 TIMES DAILY PRN
Status: DISCONTINUED | OUTPATIENT
Start: 2024-04-25 | End: 2024-04-26 | Stop reason: HOSPADM

## 2024-04-25 RX ORDER — IPRATROPIUM BROMIDE AND ALBUTEROL SULFATE 2.5; .5 MG/3ML; MG/3ML
1 SOLUTION RESPIRATORY (INHALATION)
Status: DISCONTINUED | OUTPATIENT
Start: 2024-04-25 | End: 2024-04-26 | Stop reason: HOSPADM

## 2024-04-25 RX ORDER — PREDNISONE 20 MG/1
20 TABLET ORAL 2 TIMES DAILY
Status: DISCONTINUED | OUTPATIENT
Start: 2024-04-25 | End: 2024-04-26 | Stop reason: HOSPADM

## 2024-04-25 RX ORDER — GUAIFENESIN 600 MG/1
600 TABLET, EXTENDED RELEASE ORAL 2 TIMES DAILY
Status: DISCONTINUED | OUTPATIENT
Start: 2024-04-25 | End: 2024-04-26 | Stop reason: HOSPADM

## 2024-04-25 RX ADMIN — BUDESONIDE 500 MCG: 0.5 INHALANT ORAL at 20:48

## 2024-04-25 RX ADMIN — FERROUS SULFATE TAB 325 MG (65 MG ELEMENTAL FE) 325 MG: 325 (65 FE) TAB at 08:35

## 2024-04-25 RX ADMIN — GUAIFENESIN 600 MG: 600 TABLET, EXTENDED RELEASE ORAL at 19:54

## 2024-04-25 RX ADMIN — APIXABAN 2.5 MG: 2.5 TABLET, FILM COATED ORAL at 08:35

## 2024-04-25 RX ADMIN — FOLIC ACID 1 MG: 1 TABLET ORAL at 08:36

## 2024-04-25 RX ADMIN — GUAIFENESIN 600 MG: 600 TABLET, EXTENDED RELEASE ORAL at 10:30

## 2024-04-25 RX ADMIN — PREDNISONE 20 MG: 20 TABLET ORAL at 19:54

## 2024-04-25 RX ADMIN — POTASSIUM BICARBONATE 40 MEQ: 782 TABLET, EFFERVESCENT ORAL at 08:36

## 2024-04-25 RX ADMIN — SODIUM CHLORIDE, PRESERVATIVE FREE 10 ML: 5 INJECTION INTRAVENOUS at 19:54

## 2024-04-25 RX ADMIN — SODIUM CHLORIDE, PRESERVATIVE FREE 10 ML: 5 INJECTION INTRAVENOUS at 08:36

## 2024-04-25 RX ADMIN — PREDNISONE 20 MG: 20 TABLET ORAL at 10:30

## 2024-04-25 RX ADMIN — AZITHROMYCIN MONOHYDRATE 500 MG: 500 INJECTION, POWDER, LYOPHILIZED, FOR SOLUTION INTRAVENOUS at 18:50

## 2024-04-25 RX ADMIN — IPRATROPIUM BROMIDE AND ALBUTEROL SULFATE 1 DOSE: .5; 2.5 SOLUTION RESPIRATORY (INHALATION) at 20:48

## 2024-04-25 RX ADMIN — CEFTRIAXONE SODIUM 1000 MG: 1 INJECTION, POWDER, FOR SOLUTION INTRAMUSCULAR; INTRAVENOUS at 18:49

## 2024-04-25 RX ADMIN — APIXABAN 2.5 MG: 2.5 TABLET, FILM COATED ORAL at 19:54

## 2024-04-25 RX ADMIN — BUDESONIDE 500 MCG: 0.5 INHALANT ORAL at 07:46

## 2024-04-25 RX ADMIN — IPRATROPIUM BROMIDE AND ALBUTEROL SULFATE 1 DOSE: .5; 2.5 SOLUTION RESPIRATORY (INHALATION) at 07:46

## 2024-04-25 RX ADMIN — Medication 1 G: at 08:36

## 2024-04-25 RX ADMIN — LEVOTHYROXINE SODIUM 88 MCG: 0.09 TABLET ORAL at 08:38

## 2024-04-25 RX ADMIN — METOPROLOL SUCCINATE 25 MG: 25 TABLET, FILM COATED, EXTENDED RELEASE ORAL at 08:35

## 2024-04-25 RX ADMIN — HYDROXYZINE HYDROCHLORIDE 25 MG: 25 INJECTION, SOLUTION INTRAMUSCULAR at 08:41

## 2024-04-25 RX ADMIN — METHYLPREDNISOLONE SODIUM SUCCINATE 40 MG: 40 INJECTION INTRAMUSCULAR; INTRAVENOUS at 08:35

## 2024-04-25 RX ADMIN — TORSEMIDE 5 MG: 10 TABLET ORAL at 08:37

## 2024-04-25 ASSESSMENT — ENCOUNTER SYMPTOMS
GASTROINTESTINAL NEGATIVE: 1
SHORTNESS OF BREATH: 1
COUGH: 1

## 2024-04-25 NOTE — PROGRESS NOTES
OCCUPATIONAL THERAPY EVALUATION AND DISCHARGE  Patient: Mey Lawrence (84 y.o. female)  Date: 4/25/2024  Primary Diagnosis: COPD exacerbation (HCC) [J44.1]  Pneumonia of left upper lobe due to infectious organism [J18.9]  CAP (community acquired pneumonia) due to Chlamydia species [J16.0]  Acute hypoxic respiratory failure (HCC) [J96.01]       Precautions: Fall Risk                Recommendations for nursing mobility: Out of bed to chair for meals, Encourage HEP in prep for ADLs/mobility; see handout for details, Frequent repositioning to prevent skin breakdown, Use of bed/chair alarm for safety, LE elevation for management of edema, AD and gt belt for bed to chair , Amb to bathroom with AD and gait belt, Amb in hallway, and Assist x1    In place during session: Nasal Cannula 1.5L  ASSESSMENT  Pt is a 84 y.o. female presenting to [facility] with c/o SOB, admitted 4/22/24 and currently being treated for community-acquired PNA, malignant pleural effusion, acute respiratory failure, hx lung CA, COPD, HTN, CHF, hyponatremia, hypokalemia. Pt received seated in chair upon arrival, AXO x 4, and agreeable to OT evaluation.     Based on the objective data described below pt currently present at baseline function for ADLs and function mobility/transfers at this time. (See below for objective details and assist levels).     Overall pt tolerated session well today with no c/o pain or SOB with activity. Sit<>stand transfers completed IND. Functional mobility to/from the bathroom completed with SBA for safety. Pt initially utilized the RW for functional mobility to the bathroom however pt steady with no LOB and pt does not use AD so pt ambulated back to chair without RW. LB dressing completed IND seated in chair. Pt demo'ing WFL FM and GM coordination and strength/AROM BUE so anticipate pt being IND with grooming and self feeding. SpO2 maintained 100% with activity. Pt has no skilled acute OT needs at this time either noted by OT

## 2024-04-25 NOTE — PROGRESS NOTES
- 100 mmHg    O2 Sat, Arterial 98 95 - 99 %    HCO3, Arterial 32 (H) 22 - 26 mmol/L    Base Excess, Arterial 7.4 (H) 0 - 3 mmol/L    O2 Method Nasal Cannula      O2 FLOW RATE, POC 2.00 L/min    FIO2 Arterial 28.0 %    Source Arterial      Site Left Brachial      Alex Test YES      Carboxyhgb, Arterial 0.3 (L) 1 - 2 %    Methemoglobin, Arterial 0.3 0 - 1.4 %    Oxyhemoglobin 97.4 95 - 99 %    Performed by: Ana Laura Guillory     Temperature 98.6        Assessment and Plan:     Recurrent Non-small Cell Lung Cancer  -follow with Dr. Grullon last seen 4/17/24  -received concurrent chemotherapy with carbo/taxol and radiation.   -Completed Imfinzi q14 days s/p C34 on 11/4/20.   -Currently on yearly Reclast for osteoporosis.   -had recurrent disease and treated with -Carbo+Alimta+Keytruda s/p  3 cycles stopped due to progressive disease.   -last received second line treatment with docetaxel weekly regimen.   -on treatment break now as she had stable disease and also had decline in performance status.   -has a malignant pleural effusion that is requiring recurrent thoracentesis as well.    -CT reviewed on admission. Likely acute worsening of her hypoxia and so appear to be related to pneumonia and CHF exacerbation. She has no progression at this time.      Iron deficiency Anemia  -Hgb low end of normal, continue to monitor  -Had iron infusion 4/22 in office  -continue PO iron supplementation      CAP  -Repeat Chest Xray reviewed  -continue treatment per primary team     Acute respiratory failure hypoxic  COPD  CHF  -pulmonary consulted  -on nebulizer treatments, Symbicort, and IV steroids  -on room air at time of visit   -BNP elevated; known CHF with low EF, continue torsemide.     Noted plans for overnight pulse ox tonight    Signed By: RAFAELA Hollis - CNP     April 25, 2024         I have seen, examined and evaluated the patient with Raquel Stapleton NP under my direct supervision. I have reviewed the note and  edited it and agree with the assessment and plan of care as documented.   HPI, social history, family history, ROS, physical examination and management plan have been reviewed and verified.        Dr. Lisa Grullon  4/25/24

## 2024-04-25 NOTE — PROGRESS NOTES
PULMONARY NOTE  [facility]    Name: Mey Lawrence MRN: [id number]   : 1940 Hospital: [facility]   Date: 2024  Admission date: 2024 Hospital Day: 4       HPI:     Patient Active Problem List   Diagnosis    Shortness of breath    COPD (chronic obstructive pulmonary disease) (HCC)    Elevated troponin    Lung cancer (HCC)    PNA (pneumonia)    Hyponatremia    CAP (community acquired pneumonia) due to Chlamydia species             [x] High complexity decision making was performed  [x] See my orders for details      Subjective/Initial History:     I was asked by Alix Rey MD to see Mey Lawrence  a 84 y.o.   female in consultation     Excerpts from admission 2024 or consult notes as follows:   84-year-old lady came in because of shortness of breath and dyspnea history of lung cancer COPD hypertension hypothyroidism she is confused unable to give good history apparently patient is not on oxygen at home her  is on oxygen condition got worse and came to the hospital chest x-ray was done which shows left upper lobe infiltrate and left pleural effusion with collapse so admitted and pulmonary consult was called      Allergies   Allergen Reactions    Banana Rash        MAR reviewed and pertinent medications noted or modified as needed     Current Facility-Administered Medications   Medication Dose Route Frequency Provider Last Rate Last Admin    potassium chloride (KLOR-CON) extended release tablet 40 mEq  40 mEq Oral Once Alix Rey MD        sodium chloride tablet 1 g  1 g Oral Daily Alexa Fraga MD   1 g at 24 0836    potassium chloride (KLOR-CON M) extended release tablet 40 mEq  40 mEq Oral Once Alexa Fraga MD        hydrOXYzine (VISTARIL) injection 25 mg  25 mg IntraMUSCular Q6H PRN Alix Rey MD   25 mg at 24 0841    apixaban (ELIQUIS) tablet 2.5 mg  2.5 mg Oral BID Alix Rey MD   2.5 mg at 24 0835        acetaminophen (TYLENOL) suppository 650 mg  650 mg Rectal Q6H PRN Alix Rey MD        azithromycin (ZITHROMAX) 500 mg in sodium chloride 0.9 % 250 mL IVPB ([id number])  500 mg IntraVENous Q24H Alix Rey MD   Stopped at 24    cefTRIAXone (ROCEPHIN) 1,000 mg in sterile water 10 mL IV syringe  1,000 mg IntraVENous Q24H Alix Rey MD   1,000 mg at 24    methylPREDNISolone sodium succ (SOLU-MEDROL) injection 40 mg  40 mg IntraVENous Q6H Alix Rey MD   40 mg at 24 0835    ipratropium 0.5 mg-albuterol 2.5 mg (DUONEB) nebulizer solution 1 Dose  1 Dose Inhalation Q4H WA RT Alix Rey MD   1 Dose at 24 0746    [Held by provider] hydrOXYzine pamoate (VISTARIL) capsule 25 mg  25 mg Oral Q6H PRN Alix Rey MD          Patient PCP: Bela Jolley MD  PMH:  has a past medical history of Chronic obstructive pulmonary disease (HCC), Hypertension, Hypothyroidism, and Lung cancer (HCC).  PSH:   has no past surgical history on file.   FHX: family history is not on file.   SHX:  reports that she has never smoked. She has never used smokeless tobacco. She reports that she does not drink alcohol and does not use drugs.     ROS:    Review of Systems   Constitutional: Negative.    HENT: Negative.     Respiratory:  Positive for cough and shortness of breath.    Cardiovascular: Negative.    Gastrointestinal: Negative.    Musculoskeletal: Negative.           Objective:     Vital Signs: Telemetry:    normal sinus rhythm Intake/Output:   /60   Pulse (!) 121   Temp 97.5 °F (36.4 °C) (Oral)   Resp 20   Ht 1.575 m (5' 2.01\")   Wt 47.6 kg (105 lb)   SpO2 97%   BMI 19.20 kg/m²     Temp (24hrs), Av.6 °F (36.4 °C), Min:97.5 °F (36.4 °C), Max:97.9 °F (36.6 °C)          O2 Device: None (Room air) O2 Flow Rate (L/min): 2 L/min     Wt Readings from Last 4 Encounters:   24 47.6 kg (105 lb)   24 42.1 kg (92 lb 12.8 oz)   23 47 kg (103 lb 9.6

## 2024-04-25 NOTE — PROGRESS NOTES
Hematology/Oncology   Progress Note    Patient: Mey Lawrence MRN: [id number]     YOB: 1940  Age: 84 y.o.  Sex: female      Admit Date: 4/22/2024    LOS: 4 days     Chief Complaint: SOB    Subjective:     Patient resting in chair comfortably, NAD. No complaints at time of visit; denies chest pain, palpitations.  She is requiring home oxygen 2l by nasal canula now.   Noted plans for discharge today.    Objective:     Vitals:    04/26/24 0151 04/26/24 0315 04/26/24 0900 04/26/24 1015   BP:  134/83 125/73    Pulse: 95 96 94 90   Resp: 16 16 18 18   Temp:  98 °F (36.7 °C) 97.8 °F (36.6 °C)    TempSrc:  Axillary Oral    SpO2: 96% 97% 95% 99%   Weight:  41.8 kg (92 lb 3.2 oz)     Height:          Physical Exam:  Constitutional Alert, cooperative, oriented. Mood and affect appropriate.    Head Normocephalic; no scars   Eyes Conjunctivae and sclerae are clear and without icterus. Pupils are reactive and equal.   Neck Supple without masses or thyromegaly. No jugular venous distension.   Hematologic/Lymphatic No petechiae or purpura.   Respiratory Lungs are clear to auscultation   Cardiovascular Regular rate and rhythm of heart    Chest / Line Site Chest is symmetric with no chest wall deformities.   Abdomen Non-tender, non-distended, no masses, ascites or hepatosplenomegaly. Good bowel sounds. No guarding or rebound tenderness.    Musculoskeletal No tenderness or swelling   Extremities No visible deformities or edema.    Skin No rashes, scars, or lesions suggestive of malignancy.    Neurologic Alert and confused. Coherent speech. Verbalizes understanding of our discussions today     Lab/Data Review:  Recent Labs     04/24/24  0501 04/25/24  0832 04/26/24  0952   WBC 12.4* 12.2* 8.6   HGB 9.7* 10.6* 10.3*   HCT 29.0* 31.8* 32.3*    370 335     Recent Labs     04/24/24  0501 04/25/24  0832 04/26/24  0952    137 141   K 3.2* 3.8 3.4*    103 105   CO2 30 29 32   BUN 28* 28* 21*   MG  --

## 2024-04-25 NOTE — PLAN OF CARE
Problem: Discharge Planning  Goal: Discharge to home or other facility with appropriate resources  Outcome: Progressing  Flowsheets (Taken 4/24/2024 1930)  Discharge to home or other facility with appropriate resources: Identify barriers to discharge with patient and caregiver     Problem: Skin/Tissue Integrity  Goal: Absence of new skin breakdown  Description: 1.  Monitor for areas of redness and/or skin breakdown  2.  Assess vascular access sites hourly  3.  Every 4-6 hours minimum:  Change oxygen saturation probe site  4.  Every 4-6 hours:  If on nasal continuous positive airway pressure, respiratory therapy assess nares and determine need for appliance change or resting period.  Outcome: Progressing     Problem: Safety - Adult  Goal: Free from fall injury  Outcome: Progressing     Problem: ABCDS Injury Assessment  Goal: Absence of physical injury  Outcome: Progressing     Problem: Nutrition Deficit:  Goal: Optimize nutritional status  Outcome: Progressing

## 2024-04-25 NOTE — PLAN OF CARE
Problem: Discharge Planning  Goal: Discharge to home or other facility with appropriate resources  4/25/2024 1902 by Masood Cohen RN  Outcome: Progressing  4/25/2024 1615 by Masood Cohen RN  Outcome: Progressing  4/25/2024 0643 by Francisco Jha RN  Outcome: Progressing  Flowsheets (Taken 4/24/2024 1930)  Discharge to home or other facility with appropriate resources: Identify barriers to discharge with patient and caregiver     Problem: Skin/Tissue Integrity  Goal: Absence of new skin breakdown  Description: 1.  Monitor for areas of redness and/or skin breakdown  2.  Assess vascular access sites hourly  3.  Every 4-6 hours minimum:  Change oxygen saturation probe site  4.  Every 4-6 hours:  If on nasal continuous positive airway pressure, respiratory therapy assess nares and determine need for appliance change or resting period.  4/25/2024 1902 by Masood Cohen RN  Outcome: Progressing  4/25/2024 1615 by Masood Cohen RN  Outcome: Progressing  4/25/2024 0643 by Francisco Jha RN  Outcome: Progressing     Problem: Safety - Adult  Goal: Free from fall injury  4/25/2024 1902 by Masood Cohen RN  Outcome: Progressing  Flowsheets (Taken 4/25/2024 1642)  Free From Fall Injury: Instruct family/caregiver on patient safety  4/25/2024 1615 by Masood Cohen RN  Outcome: Progressing  4/25/2024 0643 by Francisco Jha RN  Outcome: Progressing     Problem: ABCDS Injury Assessment  Goal: Absence of physical injury  4/25/2024 1902 by Masood Cohen RN  Outcome: Progressing  4/25/2024 1615 by Masood Cohen RN  Outcome: Progressing  4/25/2024 0643 by Francisco Jha RN  Outcome: Progressing     Problem: Nutrition Deficit:  Goal: Optimize nutritional status  4/25/2024 1902 by Masood Cohen RN  Outcome: Progressing  4/25/2024 1615 by Masood Cohen RN  Outcome: Progressing  4/25/2024 0643 by Francisco Jha  RN  Outcome: Progressing

## 2024-04-25 NOTE — PLAN OF CARE
Problem: Discharge Planning  Goal: Discharge to home or other facility with appropriate resources  4/25/2024 1615 by Masood Cohen RN  Outcome: Progressing  4/25/2024 0643 by Francisco Jha RN  Outcome: Progressing  Flowsheets (Taken 4/24/2024 1930)  Discharge to home or other facility with appropriate resources: Identify barriers to discharge with patient and caregiver     Problem: Skin/Tissue Integrity  Goal: Absence of new skin breakdown  Description: 1.  Monitor for areas of redness and/or skin breakdown  2.  Assess vascular access sites hourly  3.  Every 4-6 hours minimum:  Change oxygen saturation probe site  4.  Every 4-6 hours:  If on nasal continuous positive airway pressure, respiratory therapy assess nares and determine need for appliance change or resting period.  4/25/2024 1615 by Masood Cohen RN  Outcome: Progressing  4/25/2024 0643 by Francisco Jha RN  Outcome: Progressing     Problem: Safety - Adult  Goal: Free from fall injury  4/25/2024 1615 by Masood Cohen RN  Outcome: Progressing  4/25/2024 0643 by Francisco Jha RN  Outcome: Progressing     Problem: ABCDS Injury Assessment  Goal: Absence of physical injury  4/25/2024 1615 by Masood Cohen RN  Outcome: Progressing  4/25/2024 0643 by Francisco Jha RN  Outcome: Progressing     Problem: Nutrition Deficit:  Goal: Optimize nutritional status  4/25/2024 1615 by Masood Cohen RN  Outcome: Progressing  4/25/2024 0643 by Francisco Jha RN  Outcome: Progressing

## 2024-04-25 NOTE — PROGRESS NOTES
History and Physical    NAME:   Mey Lawrence   :  1940   MRN:  [id number]     Date/Time: 2024 9:22 AM    Patient PCP: Bela Jolley MD  ______________________________________________________________________       Subjective:     CHIEF COMPLAINT:       Shortness of breath        HISTORY OF PRESENT ILLNESS:       Patient is a 84 y.o. year old female past medical history of lung cancer status post history of COPD hypertension congestive heart failure last treatment was in 2024 came to emergency room complaining of shortness of breath  And was at cancer center for iron infusion.  Patient developed shortness of breath saturation drops patient having shortness of breath for little exertion for a few weeks    Seen by the ER physician workup done including the chest x-ray shows left upper lobe pneumonia increased chronic moderate left pleural effusion and chronic left lung collapse    Patient was admitted for further evaluation and treatment        Patient feeling much better this morning  BNP 1411 chest x-ray shows right upper lobe pneumonia        Patient alert awake very confused last night back to baseline this morning  Potassium today 3.2  Cultures so far negative  Patient have echo done showed ejection fraction was 65 to 70% normal wall motion        Patient have episode of shortness of breath /placed on 2 L oxygen by nasal cannula chest x-ray ABG ordered    Oxygen saturation dropped to 88% on room air on walking/later back up to 90%  Respiratory recommend to continue monitoring ambulatory pulse ox    ABG done pH 7.49 pCO2 43 pO2 126 oxygen saturation 98    Past Medical History:   Diagnosis Date    Chronic obstructive pulmonary disease (HCC)     Hypertension     Hypothyroidism     Lung cancer (HCC)         No past surgical history on file.    Social History     Tobacco Use    Smoking status: Never    Smokeless tobacco: Never   Substance Use Topics    Alcohol use: Never

## 2024-04-25 NOTE — PROGRESS NOTES
Nephrology follow up          Patient: Mey Lawrence MRN: [id number]  SSN: [id number]    YOB: 1940  Age: 84 y.o.  Sex: female      Subjective:   The patient is seen at the bedside  She is sitting on the chair  She looks comfortable  Sodium has improved to 137  K is 3.8  Past Medical History:   Diagnosis Date    Chronic obstructive pulmonary disease (HCC)     Hypertension     Hypothyroidism     Lung cancer (HCC)      No past surgical history on file.   No family history on file.  Social History     Tobacco Use    Smoking status: Never    Smokeless tobacco: Never   Substance Use Topics    Alcohol use: Never      Current Facility-Administered Medications   Medication Dose Route Frequency Provider Last Rate Last Admin    hydrOXYzine pamoate (VISTARIL) capsule 25 mg  25 mg Oral TID PRN Yfn Sinclair MD        predniSONE (DELTASONE) tablet 20 mg  20 mg Oral BID Yfn Sinclair MD   20 mg at 04/25/24 1954    guaiFENesin (MUCINEX) extended release tablet 600 mg  600 mg Oral BID Alix Rey MD   600 mg at 04/25/24 1954    ipratropium 0.5 mg-albuterol 2.5 mg (DUONEB) nebulizer solution 1 Dose  1 Dose Inhalation Q6H RT Yfn Sinclair MD        potassium chloride (KLOR-CON) extended release tablet 40 mEq  40 mEq Oral Once Alix Rey MD        sodium chloride tablet 1 g  1 g Oral Daily Alexa Fraga MD   1 g at 04/25/24 0836    potassium chloride (KLOR-CON M) extended release tablet 40 mEq  40 mEq Oral Once Alexa Fraga MD        hydrOXYzine (VISTARIL) injection 25 mg  25 mg IntraMUSCular Q6H PRN Alix Rey MD   25 mg at 04/25/24 0841    apixaban (ELIQUIS) tablet 2.5 mg  2.5 mg Oral BID Alix Rey MD   2.5 mg at 04/25/24 1954    budesonide (PULMICORT) nebulizer suspension 500 mcg  500 mcg Nebulization BID RT Alix Rey MD   500 mcg at 04/25/24 0746    ferrous sulfate (IRON 325) tablet 325 mg  325 mg Oral Daily Alix Rey MD   325 mg at 04/25/24  0835    folic acid (FOLVITE) tablet 1 mg  1 mg Oral Daily Alix Rey MD   1 mg at 04/25/24 0836    ipratropium 0.5 mg-albuterol 2.5 mg (DUONEB) nebulizer solution 1 Dose  1 Dose Inhalation Q4H PRN Alix Rey MD        metoprolol succinate (TOPROL XL) extended release tablet 25 mg  25 mg Oral Daily Alix Rey MD   25 mg at 04/25/24 0835    levothyroxine (SYNTHROID) tablet 88 mcg  88 mcg Oral Daily Alix Rey MD   88 mcg at 04/25/24 0838    torsemide (DEMADEX) tablet 5 mg  5 mg Oral Daily Alix Rey MD   5 mg at 04/25/24 0837    sodium chloride flush 0.9 % injection 5-40 mL  5-40 mL IntraVENous 2 times per day Alix Rey MD   10 mL at 04/25/24 1954    sodium chloride flush 0.9 % injection 5-40 mL  5-40 mL IntraVENous PRN Alix Rey MD        0.9 % sodium chloride infusion   IntraVENous PRN Alix Rey MD        potassium chloride (KLOR-CON M) extended release tablet 40 mEq  40 mEq Oral PRN Alix Rey MD        Or    potassium bicarb-citric acid (EFFER-K) effervescent tablet 40 mEq  40 mEq Oral PRN Alix Rey MD   40 mEq at 04/25/24 0836    Or    potassium chloride 10 mEq/100 mL IVPB (Peripheral Line)  10 mEq IntraVENous PRN Alix Rey MD        magnesium sulfate 2000 mg in 50 mL IVPB premix  2,000 mg IntraVENous PRN Alix Rey MD        ondansetron (ZOFRAN-ODT) disintegrating tablet 4 mg  4 mg Oral Q8H PRN Alix Rey MD        Or    ondansetron (ZOFRAN) injection 4 mg  4 mg IntraVENous Q6H PRN Alix Rey MD        polyethylene glycol (GLYCOLAX) packet 17 g  17 g Oral Daily PRN Alix Rey MD        acetaminophen (TYLENOL) tablet 650 mg  650 mg Oral Q6H PRN Alix Rey MD        Or    acetaminophen (TYLENOL) suppository 650 mg  650 mg Rectal Q6H PRN Alix Rey MD        azithromycin (ZITHROMAX) 500 mg in sodium chloride 0.9 % 250 mL IVPB ([id number])  500 mg IntraVENous Q24H Alix Rey  mL/hr

## 2024-04-26 VITALS
BODY MASS INDEX: 16.97 KG/M2 | WEIGHT: 92.2 LBS | DIASTOLIC BLOOD PRESSURE: 60 MMHG | HEIGHT: 62 IN | HEART RATE: 109 BPM | RESPIRATION RATE: 18 BRPM | TEMPERATURE: 98.1 F | OXYGEN SATURATION: 100 % | SYSTOLIC BLOOD PRESSURE: 114 MMHG

## 2024-04-26 LAB
ALBUMIN SERPL-MCNC: 2.7 G/DL (ref 3.5–5)
ANION GAP SERPL CALC-SCNC: 4 MMOL/L (ref 5–15)
BUN SERPL-MCNC: 21 MG/DL (ref 6–20)
BUN/CREAT SERPL: 27 (ref 12–20)
CA-I BLD-MCNC: 9.3 MG/DL (ref 8.5–10.1)
CHLORIDE SERPL-SCNC: 105 MMOL/L (ref 97–108)
CO2 SERPL-SCNC: 32 MMOL/L (ref 21–32)
CREAT SERPL-MCNC: 0.79 MG/DL (ref 0.55–1.02)
ERYTHROCYTE [DISTWIDTH] IN BLOOD BY AUTOMATED COUNT: 14.6 % (ref 11.5–14.5)
GLUCOSE SERPL-MCNC: 78 MG/DL (ref 65–100)
HCT VFR BLD AUTO: 32.3 % (ref 35–47)
HGB BLD-MCNC: 10.3 G/DL (ref 11.5–16)
MAGNESIUM SERPL-MCNC: 2.6 MG/DL (ref 1.6–2.4)
MCH RBC QN AUTO: 28.1 PG (ref 26–34)
MCHC RBC AUTO-ENTMCNC: 31.9 G/DL (ref 30–36.5)
MCV RBC AUTO: 88 FL (ref 80–99)
NRBC # BLD: 0 K/UL (ref 0–0.01)
NRBC BLD-RTO: 0 PER 100 WBC
PHOSPHATE SERPL-MCNC: 2 MG/DL (ref 2.6–4.7)
PLATELET # BLD AUTO: 335 K/UL (ref 150–400)
PMV BLD AUTO: 10.2 FL (ref 8.9–12.9)
POTASSIUM SERPL-SCNC: 3.4 MMOL/L (ref 3.5–5.1)
RBC # BLD AUTO: 3.67 M/UL (ref 3.8–5.2)
SODIUM SERPL-SCNC: 141 MMOL/L (ref 136–145)
WBC # BLD AUTO: 8.6 K/UL (ref 3.6–11)

## 2024-04-26 PROCEDURE — 94761 N-INVAS EAR/PLS OXIMETRY MLT: CPT

## 2024-04-26 PROCEDURE — 2700000000 HC OXYGEN THERAPY PER DAY

## 2024-04-26 PROCEDURE — 97161 PT EVAL LOW COMPLEX 20 MIN: CPT

## 2024-04-26 PROCEDURE — 6360000002 HC RX W HCPCS: Performed by: FAMILY MEDICINE

## 2024-04-26 PROCEDURE — 6370000000 HC RX 637 (ALT 250 FOR IP): Performed by: INTERNAL MEDICINE

## 2024-04-26 PROCEDURE — 97116 GAIT TRAINING THERAPY: CPT

## 2024-04-26 PROCEDURE — 85027 COMPLETE CBC AUTOMATED: CPT

## 2024-04-26 PROCEDURE — 36415 COLL VENOUS BLD VENIPUNCTURE: CPT

## 2024-04-26 PROCEDURE — 94640 AIRWAY INHALATION TREATMENT: CPT

## 2024-04-26 PROCEDURE — 2580000003 HC RX 258: Performed by: FAMILY MEDICINE

## 2024-04-26 PROCEDURE — 6370000000 HC RX 637 (ALT 250 FOR IP): Performed by: FAMILY MEDICINE

## 2024-04-26 PROCEDURE — 83735 ASSAY OF MAGNESIUM: CPT

## 2024-04-26 PROCEDURE — 80069 RENAL FUNCTION PANEL: CPT

## 2024-04-26 RX ORDER — ALBUTEROL SULFATE 90 UG/1
2 AEROSOL, METERED RESPIRATORY (INHALATION) 4 TIMES DAILY
Qty: 18 G | Refills: 3 | Status: SHIPPED | OUTPATIENT
Start: 2024-04-26

## 2024-04-26 RX ORDER — HYDROXYZINE PAMOATE 25 MG/1
25 CAPSULE ORAL 3 TIMES DAILY PRN
Qty: 30 CAPSULE | Refills: 0 | Status: SHIPPED | OUTPATIENT
Start: 2024-04-26 | End: 2024-05-10

## 2024-04-26 RX ORDER — BUDESONIDE 0.5 MG/2ML
1 INHALANT ORAL 2 TIMES DAILY
Qty: 60 EACH | Refills: 3 | Status: SHIPPED | OUTPATIENT
Start: 2024-04-26

## 2024-04-26 RX ORDER — PREDNISONE 20 MG/1
20 TABLET ORAL 2 TIMES DAILY
Qty: 20 TABLET | Refills: 0 | Status: SHIPPED | OUTPATIENT
Start: 2024-04-26 | End: 2024-05-06

## 2024-04-26 RX ORDER — LEVOFLOXACIN 750 MG/1
750 TABLET, FILM COATED ORAL DAILY
Qty: 7 TABLET | Refills: 0 | Status: SHIPPED | OUTPATIENT
Start: 2024-04-26 | End: 2024-05-06

## 2024-04-26 RX ORDER — IPRATROPIUM BROMIDE AND ALBUTEROL SULFATE 2.5; .5 MG/3ML; MG/3ML
3 SOLUTION RESPIRATORY (INHALATION) EVERY 4 HOURS PRN
Qty: 360 ML | Refills: 0 | Status: SHIPPED | OUTPATIENT
Start: 2024-04-26

## 2024-04-26 RX ORDER — GUAIFENESIN 600 MG/1
600 TABLET, EXTENDED RELEASE ORAL 2 TIMES DAILY
Qty: 30 TABLET | Refills: 0 | Status: SHIPPED | OUTPATIENT
Start: 2024-04-26

## 2024-04-26 RX ADMIN — FERROUS SULFATE TAB 325 MG (65 MG ELEMENTAL FE) 325 MG: 325 (65 FE) TAB at 09:24

## 2024-04-26 RX ADMIN — IPRATROPIUM BROMIDE AND ALBUTEROL SULFATE 1 DOSE: .5; 2.5 SOLUTION RESPIRATORY (INHALATION) at 10:14

## 2024-04-26 RX ADMIN — IPRATROPIUM BROMIDE AND ALBUTEROL SULFATE 1 DOSE: .5; 2.5 SOLUTION RESPIRATORY (INHALATION) at 14:34

## 2024-04-26 RX ADMIN — FOLIC ACID 1 MG: 1 TABLET ORAL at 09:24

## 2024-04-26 RX ADMIN — SODIUM CHLORIDE, PRESERVATIVE FREE 10 ML: 5 INJECTION INTRAVENOUS at 09:25

## 2024-04-26 RX ADMIN — GUAIFENESIN 600 MG: 600 TABLET, EXTENDED RELEASE ORAL at 09:24

## 2024-04-26 RX ADMIN — APIXABAN 2.5 MG: 2.5 TABLET, FILM COATED ORAL at 09:24

## 2024-04-26 RX ADMIN — IPRATROPIUM BROMIDE AND ALBUTEROL SULFATE 1 DOSE: .5; 2.5 SOLUTION RESPIRATORY (INHALATION) at 01:50

## 2024-04-26 RX ADMIN — TORSEMIDE 5 MG: 10 TABLET ORAL at 09:24

## 2024-04-26 RX ADMIN — PREDNISONE 20 MG: 20 TABLET ORAL at 09:24

## 2024-04-26 RX ADMIN — BUDESONIDE 500 MCG: 0.5 INHALANT ORAL at 10:15

## 2024-04-26 RX ADMIN — Medication 1 G: at 09:24

## 2024-04-26 RX ADMIN — METOPROLOL SUCCINATE 25 MG: 25 TABLET, FILM COATED, EXTENDED RELEASE ORAL at 09:24

## 2024-04-26 ASSESSMENT — ENCOUNTER SYMPTOMS
SHORTNESS OF BREATH: 1
COUGH: 1
GASTROINTESTINAL NEGATIVE: 1

## 2024-04-26 NOTE — PROGRESS NOTES
1621: Discharge instructions and paperwork given at the bedside to the patient. Verified Pharmacy of choice with the patient and daughter Rashmi Whiting       Patient cleared by attending Alix Orellana, Nephrology and pulmonology   Patient Alert and oriented X4, Patient groomed and dressed and assisted by staff members. Peripheral IV removed, No complicated noted, Catheter intact and dressing placed. Patient escorted via wheelchair by staff member at 1630. All personal belongs given to the patient. Vital signs stable at this time. No questions or concerns by the patient at this time.

## 2024-04-26 NOTE — PROGRESS NOTES
Daughter Rashmi Whiting selected [facility] of pharmacy of choice at 1300, 4/26/2024. Discharge Medication bedside to Meds discussed about the  Delivery service information was signed by Rashmi Whiting, faxed [phone] and placed in the patient's chart.

## 2024-04-26 NOTE — PROGRESS NOTES
Patient oxygen saturation level at 88% during ambulation to the bathroom and utilizes accessory muscles to breath, SOB at room air. Patient was placed on  2L NC currently at 94%. Notified Krissy Grullon notes states \" Noted plans for overnight pulse ox tonight\" According to report from Nurse Francisco RN no order was placed and it was not done last night. Notified Dr. Rey.
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
## 2024-04-26 NOTE — PLAN OF CARE
PHYSICAL THERAPY EVALUATION  Patient: Mey Lawrence (84 y.o. female)  Date: 4/26/2024  Primary Diagnosis: COPD exacerbation (HCC) [J44.1]  Pneumonia of left upper lobe due to infectious organism [J18.9]  CAP (community acquired pneumonia) due to Chlamydia species [J16.0]  Acute hypoxic respiratory failure (HCC) [J96.01]       Precautions: Fall Risk                      Recommendations for nursing mobility: Out of bed to chair for meals, AD and gt belt for bed to chair , Amb to bathroom with AD and gait belt, and Assist x1    In place during session: Nasal Cannula 1L    ASSESSMENT  Pt is a 84 y.o. female admitted on 4/22/2024 for SOB; pt currently being treated for CAP, malignant pleural effusion, acute respiratory failure, COPD, hx of lung CA . Pt semi-supine upon PT arrival, agreeable to evaluation. Pt A&O x 2.  Pt reports no falls in the past 3 months.    Based on the objective data described below, the patient currently presents with impaired functional mobility, impaired strength, decreased activity tolerance, poor safety awareness, impaired cognition, decreased coordination, and impaired balance. (See below for objective details and assist levels).     Overall pt tolerated session well today with no reports of pain but with slight SOB noted. Pt required SBA for bed mobility and transfers. Pt amb 180 feet with RW and without AD (90ft x 2), gt belt and SB-CGA; demonstrates slow xochitl, decreased step length and narrow ALEX. Pt amb 90ft without AD and demos unsteadiness during gait with several times of loss of balance, requiring writer to help stabilize pt. Pt then given a RW and demos improved balance, increased xochitl and step length and overall demos improved safety. Pt states she has a RW at home and was encouraged to use it when first getting home.  Pt with slight SOB during gait session with O2 sats dropping to 97% at the lowest. Pt will benefit from continued skilled PT to address above deficits and  determined to be of the following complexity level: LOW    Pain Ratin/10   Pain Intervention(s):       Activity Tolerance:   Good, requires rest breaks, and observed shortness of breath on exertion    After treatment patient left in no apparent distress:   Bed locked and in lowest position Patient left in no apparent distress sitting up in chair, Call bell within reach, and Caregiver / family present and nsg updated.    COMMUNICATION/EDUCATION:   The patient’s plan of care was discussed with: Registered nurse    Patient Education  Education Given To: Patient  Education Provided: Role of Therapy;Plan of Care;Transfer Training  Education Method: Demonstration;Verbal  Barriers to Learning: Cognition  Education Outcome: Verbalized understanding;Continued education needed         Thank you for this referral.  Marnie Oh, PT  Minutes: 27
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
## 2024-04-26 NOTE — DISCHARGE INSTRUCTIONS
Discharge Instructions       PATIENT ID: Mey Lawrence  MRN: [id number]   YOB: 1940    DATE OF ADMISSION: 4/22/2024  DATE OF DISCHARGE: 4/26/2024    PRIMARY CARE PROVIDER: [unfilled]     ATTENDING PHYSICIAN: Alix Rey MD   DISCHARGING PROVIDER: Alix Rey MD    To contact this individual call [phone] and ask the  to page.   If unavailable, ask to be transferred the Adult Hospitalist Department.    DISCHARGE DIAGNOSES pneumonia    CONSULTATIONS: [unfilled]      PROCEDURES/SURGERIES: * No surgery found *    PENDING TEST RESULTS:   At the time of discharge the following test results are still pending: None    FOLLOW UP APPOINTMENTS:   Bela Jolley MD  [address] [facility] [address] [phone]    Schedule an appointment as soon as possible for a visit in 1 week(s)         ADDITIONAL CARE RECOMMENDATIONS: Follow-up with oncology and PCP    DIET: Resume previous diet      ACTIVITY: Activity as tolerated    Wound care: {Discharge Wound Care Instructions:08077}    EQUIPMENT needed: ***      DISCHARGE MEDICATIONS:   See Medication Reconciliation Form    It is important that you take the medication exactly as they are prescribed.   Keep your medication in the bottles provided by the pharmacist and keep a list of the medication names, dosages, and times to be taken in your wallet.   Do not take other medications without consulting your doctor.       NOTIFY YOUR PHYSICIAN FOR ANY OF THE FOLLOWING:   Fever over 101 degrees for 24 hours.   Chest pain, shortness of breath, fever, chills, nausea, vomiting, diarrhea, change in mentation, falling, weakness, bleeding. Severe pain or pain not relieved by medications.  Or, any other signs or symptoms that you may have questions about.      DISPOSITION:    Home With:   OT  PT  HH  RN       SNF/Inpatient Rehab/LTAC    Independent/assisted living    Hospice    Other:         PROBLEM LIST Updated:  Yes ***       Signed:   Alix

## 2024-04-26 NOTE — PROGRESS NOTES
6 min walking pulse ox    2L. NC sitting 104/99%  RA Sitting 104/99%  RA walking 122/90% (pt did not complete six minute walk, complain of SOB and test was stopped)  2L. /96% Sitting

## 2024-04-26 NOTE — PLAN OF CARE
Problem: Discharge Planning  Goal: Discharge to home or other facility with appropriate resources  Outcome: Progressing  Flowsheets (Taken 4/25/2024 2310 by Francisco Jha, RN)  Discharge to home or other facility with appropriate resources: Identify barriers to discharge with patient and caregiver     Problem: Skin/Tissue Integrity  Goal: Absence of new skin breakdown  Description: 1.  Monitor for areas of redness and/or skin breakdown  2.  Assess vascular access sites hourly  3.  Every 4-6 hours minimum:  Change oxygen saturation probe site  4.  Every 4-6 hours:  If on nasal continuous positive airway pressure, respiratory therapy assess nares and determine need for appliance change or resting period.  Outcome: Progressing     Problem: Safety - Adult  Goal: Free from fall injury  Outcome: Progressing     Problem: ABCDS Injury Assessment  Goal: Absence of physical injury  Outcome: Progressing     Problem: Nutrition Deficit:  Goal: Optimize nutritional status  Outcome: Progressing

## 2024-04-26 NOTE — PROGRESS NOTES
Nephrology follow up          Patient: Mey Lawrence MRN: [id number]  SSN: [id number]    YOB: 1940  Age: 84 y.o.  Sex: female      Subjective:   The patient is seen at the bedside  She is sitting on the chair  She looks comfortable  Sodium has improved to 141  K is 3.4  Past Medical History:   Diagnosis Date    Chronic obstructive pulmonary disease (HCC)     Hypertension     Hypothyroidism     Lung cancer (HCC)      No past surgical history on file.   No family history on file.  Social History     Tobacco Use    Smoking status: Never    Smokeless tobacco: Never   Substance Use Topics    Alcohol use: Never      Current Facility-Administered Medications   Medication Dose Route Frequency Provider Last Rate Last Admin    hydrOXYzine pamoate (VISTARIL) capsule 25 mg  25 mg Oral TID PRN Yfn Sinclair MD        predniSONE (DELTASONE) tablet 20 mg  20 mg Oral BID Yfn Sinclair MD   20 mg at 04/26/24 0924    guaiFENesin (MUCINEX) extended release tablet 600 mg  600 mg Oral BID Alix Rey MD   600 mg at 04/26/24 0924    ipratropium 0.5 mg-albuterol 2.5 mg (DUONEB) nebulizer solution 1 Dose  1 Dose Inhalation Q6H RT Yfn Sinclair MD   1 Dose at 04/26/24 1434    potassium chloride (KLOR-CON) extended release tablet 40 mEq  40 mEq Oral Once Alix Rey MD        sodium chloride tablet 1 g  1 g Oral Daily Alexa Fraga MD   1 g at 04/26/24 0924    potassium chloride (KLOR-CON M) extended release tablet 40 mEq  40 mEq Oral Once Alexa Fraga MD        hydrOXYzine (VISTARIL) injection 25 mg  25 mg IntraMUSCular Q6H PRN Alix Rey MD   25 mg at 04/25/24 0841    apixaban (ELIQUIS) tablet 2.5 mg  2.5 mg Oral BID Alix Rey MD   2.5 mg at 04/26/24 0924    budesonide (PULMICORT) nebulizer suspension 500 mcg  500 mcg Nebulization BID RT Alix Rey MD   500 mcg at 04/26/24 1015    ferrous sulfate (IRON 325) tablet 325 mg  325 mg Oral Daily Alix Rey MD

## 2024-04-26 NOTE — DISCHARGE SUMMARY
Discharge Summary       PATIENT ID: Mey Lawrence  MRN: [id number]   YOB: 1940    DATE OF ADMISSION: 4/22/2024   DATE OF DISCHARGE:   PRIMARY CARE PROVIDER: [unfilled]      ATTENDING PHYSICIAN: Alix Rey  DISCHARGING PROVIDER: Alix Rey      CONSULTATIONS: IP CONSULT TO HOSPITALIST  IP CONSULT TO HEMATOLOGY  IP CONSULT TO NEPHROLOGY  IP CONSULT TO PULMONOLOGY  IP CONSULT TO DIETITIAN    PROCEDURES/SURGERIES: * No surgery found *    ADMITTING DIAGNOSES:    Patient Active Problem List    Diagnosis Date Noted    CAP (community acquired pneumonia) due to Chlamydia species 04/22/2024    Hyponatremia 03/16/2023    Lung cancer (HCC) 03/11/2023    PNA (pneumonia) 03/11/2023    COPD (chronic obstructive pulmonary disease) ([facility]) 01/11/2022    Shortness of breath 01/10/2022    Elevated troponin 01/10/2022       DISCHARGE DIAGNOSES / PLAN:      Community-acquired pneumonia  Malignant pleural effusion  Acute respiratory failure hypoxic  History of lung cancer  COPD  Hypertension  Congestive heart failure/preserved ejection fraction  Hyponatremia  Hypokalemia        DISCHARGE MEDICATIONS:     Medication List        START taking these medications      Cholecalciferol 50 MCG (2000 UT) Tabs     guaiFENesin 600 MG extended release tablet  Commonly known as: MUCINEX  Take 1 tablet by mouth 2 times daily     hydrOXYzine pamoate 25 MG capsule  Commonly known as: VISTARIL  Take 1 capsule by mouth 3 times daily as needed for Anxiety     levoFLOXacin 750 MG tablet  Commonly known as: Levaquin  Take 1 tablet by mouth daily for 10 days     predniSONE 20 MG tablet  Commonly known as: DELTASONE  Take 1 tablet by mouth 2 times daily for 10 days            CHANGE how you take these medications      albuterol sulfate  (90 Base) MCG/ACT inhaler  Commonly known as: PROVENTIL;VENTOLIN;PROAIR  Inhale 2 puffs into the lungs 4 times daily  What changed: See the new instructions.     ipratropium 0.5 mg-albuterol  98    4/26    Patient feeling much better less anxious this morning no shortness of breath off oxygen    Will discharge patient home    Medication reconciliation done done chart patient 35 minutes 50% time spent counseling and coordination of care    Patient need to follow-up with PCP and oncology      Signed:   Alix Rey MD  4/26/2024  9:01 AM

## 2024-04-26 NOTE — PROGRESS NOTES
PULMONARY NOTE  [facility]    Name: Mey Lawrence MRN: [id number]   : 1940 Hospital: [facility]   Date: 2024  Admission date: 2024 Hospital Day: 5       HPI:     Patient Active Problem List   Diagnosis    Shortness of breath    COPD (chronic obstructive pulmonary disease) (HCC)    Elevated troponin    Lung cancer (HCC)    PNA (pneumonia)    Hyponatremia    CAP (community acquired pneumonia) due to Chlamydia species             [x] High complexity decision making was performed  [x] See my orders for details      Subjective/Initial History:     I was asked by Alix Rey MD to see Mey Lawrence  a 84 y.o.   female in consultation     Excerpts from admission 2024 or consult notes as follows:   84-year-old lady came in because of shortness of breath and dyspnea history of lung cancer COPD hypertension hypothyroidism she is confused unable to give good history apparently patient is not on oxygen at home her  is on oxygen condition got worse and came to the hospital chest x-ray was done which shows left upper lobe infiltrate and left pleural effusion with collapse so admitted and pulmonary consult was called      Allergies   Allergen Reactions    Banana Rash        MAR reviewed and pertinent medications noted or modified as needed     Current Facility-Administered Medications   Medication Dose Route Frequency Provider Last Rate Last Admin    hydrOXYzine pamoate (VISTARIL) capsule 25 mg  25 mg Oral TID PRN Yfn Sinclair MD        predniSONE (DELTASONE) tablet 20 mg  20 mg Oral BID Yfn Sinclair MD   20 mg at 24 0924    guaiFENesin (MUCINEX) extended release tablet 600 mg  600 mg Oral BID Alix Rey MD   600 mg at 24 0924    ipratropium 0.5 mg-albuterol 2.5 mg (DUONEB) nebulizer solution 1 Dose  1 Dose Inhalation Q6H RT Yfn Sinclair MD   1 Dose at 24 0150    potassium chloride (KLOR-CON) extended release tablet

## 2024-04-27 NOTE — PROGRESS NOTES
Physician Progress Note      PATIENT:               MEMO OBANDO  CSN #:                  [id number]  :                       1940  ADMIT DATE:       2024 4:55 PM  DISCH DATE:        2024 4:26 PM  RESPONDING  PROVIDER #:        Alix Rey MD          QUERY TEXT:    Pt admitted and has CHF documented. If possible, please document in progress   notes and discharge summary further specificity regarding the type and acuity   of CHF:      The medical record reflects the following:  Risk Factors: 85 y/o female \"was at [facility] for iron infusion.  Patient   developed shortness of breath saturation drops patient having shortness of   breath for little exertion for a few weeks\".  Hx: COPD, HTN, CHS, Lung CA,    Clinical Indicators: chest x-ray shows left upper lobe pneumonia increased   chronic moderate left pleural effusion and chronic left lung collapse.   4:55PM ED assessment:  Neck:  Vascular: JVD present.  Pulmonary: decreased breath sounds. Examination of the left-lower field   reveals decreased breath sounds. Decreased breath sounds and wheezing present.  Comments: Prolonged expiratory phase  Musculoskeletal:  Comments: 2+ pitting edema bilaterally        H&P: Pleural effusion; Hypertension; Congestive heart failure     XR CHEST PORTABLE Final Result  1. Probable left upper lobe pneumonia.  2. Increased, chronic, moderate, left pleural effusion.  3. Chronic left lower lobe collapse.     Echo Summary  Left Ventricle: Hyperdynamic left ventricular systolic function with a   visually estimated EF of 65 - 70%. Left ventricle size is normal. Mildly   increased wall thickness. Normal wall motion. Diastolic dysfunction present   with normal LV EF.  Mitral Valve: Mildly thickened leaflet. Moderately calcified leaflet.  Extracardiac: Left pleural effusion.    Furosemide IV 40MG x 2 doses, Toprol XL 25mg po daily, torsemide 5mg po daily    Treatment: Hospitalist consult, Admit

## 2024-04-28 LAB
BACTERIA SPEC CULT: NORMAL
Lab: NORMAL

## 2024-04-29 LAB
BACTERIA SPEC CULT: NORMAL
BACTERIA SPEC CULT: NORMAL
Lab: NORMAL
Lab: NORMAL

## 2024-06-19 ENCOUNTER — APPOINTMENT (OUTPATIENT)
Facility: HOSPITAL | Age: 84
DRG: 193 | End: 2024-06-19
Payer: MEDICARE

## 2024-06-19 ENCOUNTER — HOSPITAL ENCOUNTER (INPATIENT)
Facility: HOSPITAL | Age: 84
LOS: 3 days | Discharge: HOME | DRG: 193 | End: 2024-06-22
Attending: STUDENT IN AN ORGANIZED HEALTH CARE EDUCATION/TRAINING PROGRAM | Admitting: INTERNAL MEDICINE
Payer: MEDICARE

## 2024-06-19 DIAGNOSIS — J44.1 COPD EXACERBATION (HCC): Primary | ICD-10-CM

## 2024-06-19 DIAGNOSIS — J18.9 PNEUMONIA DUE TO INFECTIOUS ORGANISM, UNSPECIFIED LATERALITY, UNSPECIFIED PART OF LUNG: ICD-10-CM

## 2024-06-19 PROBLEM — J16.8 PNEUMONIA DUE TO OTHER SPECIFIED INFECTIOUS ORGANISMS: Status: ACTIVE | Noted: 2024-06-19

## 2024-06-19 LAB
ALBUMIN SERPL-MCNC: 3.1 G/DL (ref 3.5–5)
ALBUMIN/GLOB SERPL: 0.7 (ref 1.1–2.2)
ALP SERPL-CCNC: 54 U/L (ref 45–117)
ALT SERPL-CCNC: 17 U/L (ref 12–78)
ANION GAP SERPL CALC-SCNC: 7 MMOL/L (ref 5–15)
AST SERPL W P-5'-P-CCNC: 42 U/L (ref 15–37)
BASE EXCESS BLD CALC-SCNC: 1.7 MMOL/L
BASOPHILS # BLD: 0 K/UL (ref 0–0.1)
BASOPHILS NFR BLD: 0 % (ref 0–1)
BILIRUB SERPL-MCNC: 0.6 MG/DL (ref 0.2–1)
BNP SERPL-MCNC: 1277 PG/ML
BUN SERPL-MCNC: 18 MG/DL (ref 6–20)
BUN/CREAT SERPL: 24 (ref 12–20)
CA-I BLD-MCNC: 1.22 MMOL/L (ref 1.12–1.32)
CA-I BLD-MCNC: 9.6 MG/DL (ref 8.5–10.1)
CHLORIDE BLD-SCNC: 96 MMOL/L (ref 98–107)
CHLORIDE SERPL-SCNC: 99 MMOL/L (ref 97–108)
CO2 BLD-SCNC: 26 MMOL/L
CO2 SERPL-SCNC: 26 MMOL/L (ref 21–32)
CREAT SERPL-MCNC: 0.74 MG/DL (ref 0.55–1.02)
CREAT UR-MCNC: 0.69 MG/DL (ref 0.6–1.3)
CRP SERPL-MCNC: 4.49 MG/DL (ref 0–0.3)
DIFFERENTIAL METHOD BLD: ABNORMAL
EOSINOPHIL # BLD: 0.1 K/UL (ref 0–0.4)
EOSINOPHIL NFR BLD: 1 % (ref 0–7)
ERYTHROCYTE [DISTWIDTH] IN BLOOD BY AUTOMATED COUNT: 15.1 % (ref 11.5–14.5)
FLUAV RNA SPEC QL NAA+PROBE: NOT DETECTED
FLUBV RNA SPEC QL NAA+PROBE: NOT DETECTED
GLOBULIN SER CALC-MCNC: 4.5 G/DL (ref 2–4)
GLUCOSE BLD STRIP.AUTO-MCNC: 88 MG/DL (ref 65–100)
GLUCOSE SERPL-MCNC: 85 MG/DL (ref 65–100)
HCO3 BLD-SCNC: 26.4 MMOL/L (ref 19–28)
HCT VFR BLD AUTO: 35.6 % (ref 35–47)
HGB BLD-MCNC: 11.8 G/DL (ref 11.5–16)
IMM GRANULOCYTES # BLD AUTO: 0 K/UL (ref 0–0.04)
IMM GRANULOCYTES NFR BLD AUTO: 0 % (ref 0–0.5)
LACTATE BLD-SCNC: 1.1 MMOL/L (ref 0.4–2)
LYMPHOCYTES # BLD: 2.2 K/UL (ref 0.8–3.5)
LYMPHOCYTES NFR BLD: 23 % (ref 12–49)
MCH RBC QN AUTO: 29.5 PG (ref 26–34)
MCHC RBC AUTO-ENTMCNC: 33.1 G/DL (ref 30–36.5)
MCV RBC AUTO: 89 FL (ref 80–99)
MONOCYTES # BLD: 1 K/UL (ref 0–1)
MONOCYTES NFR BLD: 10 % (ref 5–13)
NEUTS SEG # BLD: 6.2 K/UL (ref 1.8–8)
NEUTS SEG NFR BLD: 66 % (ref 32–75)
NRBC # BLD: 0 K/UL (ref 0–0.01)
NRBC BLD-RTO: 0 PER 100 WBC
PCO2 BLD: 40.6 MMHG (ref 35–45)
PERFORMED BY:: ABNORMAL
PH BLD: 7.42 (ref 7.35–7.45)
PLATELET # BLD AUTO: 251 K/UL (ref 150–400)
PMV BLD AUTO: 10.7 FL (ref 8.9–12.9)
PO2 BLD: 37 MMHG (ref 75–100)
POTASSIUM BLD-SCNC: 4.3 MMOL/L (ref 3.5–5.5)
POTASSIUM SERPL-SCNC: 5.3 MMOL/L (ref 3.5–5.1)
PROCALCITONIN SERPL-MCNC: <0.05 NG/ML
PROT SERPL-MCNC: 7.6 G/DL (ref 6.4–8.2)
RBC # BLD AUTO: 4 M/UL (ref 3.8–5.2)
SAO2 % BLD: 72 %
SARS-COV-2 RNA RESP QL NAA+PROBE: NOT DETECTED
SODIUM BLD-SCNC: 135 MMOL/L (ref 136–145)
SODIUM SERPL-SCNC: 132 MMOL/L (ref 136–145)
SPECIMEN SITE: ABNORMAL
TROPONIN I SERPL HS-MCNC: 9 NG/L (ref 0–51)
WBC # BLD AUTO: 9.4 K/UL (ref 3.6–11)

## 2024-06-19 PROCEDURE — 6370000000 HC RX 637 (ALT 250 FOR IP)

## 2024-06-19 PROCEDURE — 84295 ASSAY OF SERUM SODIUM: CPT

## 2024-06-19 PROCEDURE — 93005 ELECTROCARDIOGRAM TRACING: CPT | Performed by: STUDENT IN AN ORGANIZED HEALTH CARE EDUCATION/TRAINING PROGRAM

## 2024-06-19 PROCEDURE — 2580000003 HC RX 258: Performed by: STUDENT IN AN ORGANIZED HEALTH CARE EDUCATION/TRAINING PROGRAM

## 2024-06-19 PROCEDURE — 87040 BLOOD CULTURE FOR BACTERIA: CPT

## 2024-06-19 PROCEDURE — 80053 COMPREHEN METABOLIC PANEL: CPT

## 2024-06-19 PROCEDURE — 81001 URINALYSIS AUTO W/SCOPE: CPT

## 2024-06-19 PROCEDURE — 82803 BLOOD GASES ANY COMBINATION: CPT

## 2024-06-19 PROCEDURE — 71045 X-RAY EXAM CHEST 1 VIEW: CPT

## 2024-06-19 PROCEDURE — 2580000003 HC RX 258: Performed by: INTERNAL MEDICINE

## 2024-06-19 PROCEDURE — 99285 EMERGENCY DEPT VISIT HI MDM: CPT

## 2024-06-19 PROCEDURE — 5A09357 ASSISTANCE WITH RESPIRATORY VENTILATION, LESS THAN 24 CONSECUTIVE HOURS, CONTINUOUS POSITIVE AIRWAY PRESSURE: ICD-10-PCS | Performed by: INTERNAL MEDICINE

## 2024-06-19 PROCEDURE — 86140 C-REACTIVE PROTEIN: CPT

## 2024-06-19 PROCEDURE — 2500000003 HC RX 250 WO HCPCS: Performed by: STUDENT IN AN ORGANIZED HEALTH CARE EDUCATION/TRAINING PROGRAM

## 2024-06-19 PROCEDURE — 71275 CT ANGIOGRAPHY CHEST: CPT

## 2024-06-19 PROCEDURE — 1100000000 HC RM PRIVATE

## 2024-06-19 PROCEDURE — 84484 ASSAY OF TROPONIN QUANT: CPT

## 2024-06-19 PROCEDURE — 96375 TX/PRO/DX INJ NEW DRUG ADDON: CPT

## 2024-06-19 PROCEDURE — 82947 ASSAY GLUCOSE BLOOD QUANT: CPT

## 2024-06-19 PROCEDURE — 87636 SARSCOV2 & INF A&B AMP PRB: CPT

## 2024-06-19 PROCEDURE — 85025 COMPLETE CBC W/AUTO DIFF WBC: CPT

## 2024-06-19 PROCEDURE — 82330 ASSAY OF CALCIUM: CPT

## 2024-06-19 PROCEDURE — 6360000002 HC RX W HCPCS: Performed by: STUDENT IN AN ORGANIZED HEALTH CARE EDUCATION/TRAINING PROGRAM

## 2024-06-19 PROCEDURE — 94761 N-INVAS EAR/PLS OXIMETRY MLT: CPT

## 2024-06-19 PROCEDURE — 36415 COLL VENOUS BLD VENIPUNCTURE: CPT

## 2024-06-19 PROCEDURE — 83605 ASSAY OF LACTIC ACID: CPT

## 2024-06-19 PROCEDURE — 6360000004 HC RX CONTRAST MEDICATION: Performed by: STUDENT IN AN ORGANIZED HEALTH CARE EDUCATION/TRAINING PROGRAM

## 2024-06-19 PROCEDURE — 83880 ASSAY OF NATRIURETIC PEPTIDE: CPT

## 2024-06-19 PROCEDURE — 84145 PROCALCITONIN (PCT): CPT

## 2024-06-19 PROCEDURE — 84132 ASSAY OF SERUM POTASSIUM: CPT

## 2024-06-19 PROCEDURE — 94660 CPAP INITIATION&MGMT: CPT

## 2024-06-19 PROCEDURE — 6360000002 HC RX W HCPCS: Performed by: INTERNAL MEDICINE

## 2024-06-19 PROCEDURE — 94640 AIRWAY INHALATION TREATMENT: CPT

## 2024-06-19 PROCEDURE — 96365 THER/PROPH/DIAG IV INF INIT: CPT

## 2024-06-19 RX ORDER — LEVOTHYROXINE SODIUM 0.1 MG/1
100 TABLET ORAL DAILY
Status: DISCONTINUED | OUTPATIENT
Start: 2024-06-20 | End: 2024-06-22 | Stop reason: HOSPADM

## 2024-06-19 RX ORDER — POTASSIUM CHLORIDE 7.45 MG/ML
10 INJECTION INTRAVENOUS PRN
Status: DISCONTINUED | OUTPATIENT
Start: 2024-06-19 | End: 2024-06-22 | Stop reason: HOSPADM

## 2024-06-19 RX ORDER — POTASSIUM CHLORIDE 20 MEQ/1
40 TABLET, EXTENDED RELEASE ORAL PRN
Status: DISCONTINUED | OUTPATIENT
Start: 2024-06-19 | End: 2024-06-22 | Stop reason: HOSPADM

## 2024-06-19 RX ORDER — FERROUS SULFATE 325(65) MG
325 TABLET ORAL
Status: DISCONTINUED | OUTPATIENT
Start: 2024-06-20 | End: 2024-06-22 | Stop reason: HOSPADM

## 2024-06-19 RX ORDER — ONDANSETRON 4 MG/1
4 TABLET, ORALLY DISINTEGRATING ORAL EVERY 8 HOURS PRN
Status: DISCONTINUED | OUTPATIENT
Start: 2024-06-19 | End: 2024-06-22 | Stop reason: HOSPADM

## 2024-06-19 RX ORDER — SODIUM CHLORIDE 1 G/1
1 TABLET ORAL DAILY
Status: DISCONTINUED | OUTPATIENT
Start: 2024-06-20 | End: 2024-06-22

## 2024-06-19 RX ORDER — FOLIC ACID 1 MG/1
1 TABLET ORAL DAILY
Status: DISCONTINUED | OUTPATIENT
Start: 2024-06-20 | End: 2024-06-22 | Stop reason: HOSPADM

## 2024-06-19 RX ORDER — BUDESONIDE 0.5 MG/2ML
500 INHALANT ORAL 2 TIMES DAILY
Status: DISCONTINUED | OUTPATIENT
Start: 2024-06-19 | End: 2024-06-22 | Stop reason: HOSPADM

## 2024-06-19 RX ORDER — VITAMIN B COMPLEX
1000 TABLET ORAL DAILY
Status: DISCONTINUED | OUTPATIENT
Start: 2024-06-20 | End: 2024-06-22 | Stop reason: HOSPADM

## 2024-06-19 RX ORDER — CETIRIZINE HYDROCHLORIDE 10 MG/1
10 TABLET ORAL DAILY
Status: DISCONTINUED | OUTPATIENT
Start: 2024-06-20 | End: 2024-06-22 | Stop reason: HOSPADM

## 2024-06-19 RX ORDER — MAGNESIUM SULFATE IN WATER 40 MG/ML
2000 INJECTION, SOLUTION INTRAVENOUS PRN
Status: DISCONTINUED | OUTPATIENT
Start: 2024-06-19 | End: 2024-06-22 | Stop reason: HOSPADM

## 2024-06-19 RX ORDER — SODIUM CHLORIDE 9 MG/ML
INJECTION, SOLUTION INTRAVENOUS PRN
Status: DISCONTINUED | OUTPATIENT
Start: 2024-06-19 | End: 2024-06-22 | Stop reason: HOSPADM

## 2024-06-19 RX ORDER — ACETAMINOPHEN 325 MG/1
650 TABLET ORAL EVERY 6 HOURS PRN
Status: DISCONTINUED | OUTPATIENT
Start: 2024-06-19 | End: 2024-06-22 | Stop reason: HOSPADM

## 2024-06-19 RX ORDER — ONDANSETRON 2 MG/ML
4 INJECTION INTRAMUSCULAR; INTRAVENOUS EVERY 6 HOURS PRN
Status: DISCONTINUED | OUTPATIENT
Start: 2024-06-19 | End: 2024-06-22 | Stop reason: HOSPADM

## 2024-06-19 RX ORDER — IPRATROPIUM BROMIDE AND ALBUTEROL SULFATE 2.5; .5 MG/3ML; MG/3ML
SOLUTION RESPIRATORY (INHALATION)
Status: COMPLETED
Start: 2024-06-19 | End: 2024-06-19

## 2024-06-19 RX ORDER — METOPROLOL SUCCINATE 25 MG/1
25 TABLET, EXTENDED RELEASE ORAL DAILY
Status: DISCONTINUED | OUTPATIENT
Start: 2024-06-20 | End: 2024-06-22 | Stop reason: HOSPADM

## 2024-06-19 RX ORDER — ACETAMINOPHEN 650 MG/1
650 SUPPOSITORY RECTAL EVERY 6 HOURS PRN
Status: DISCONTINUED | OUTPATIENT
Start: 2024-06-19 | End: 2024-06-22 | Stop reason: HOSPADM

## 2024-06-19 RX ORDER — SODIUM CHLORIDE 0.9 % (FLUSH) 0.9 %
5-40 SYRINGE (ML) INJECTION EVERY 12 HOURS SCHEDULED
Status: DISCONTINUED | OUTPATIENT
Start: 2024-06-19 | End: 2024-06-22 | Stop reason: HOSPADM

## 2024-06-19 RX ORDER — IPRATROPIUM BROMIDE AND ALBUTEROL SULFATE 2.5; .5 MG/3ML; MG/3ML
1 SOLUTION RESPIRATORY (INHALATION) EVERY 4 HOURS PRN
Status: DISCONTINUED | OUTPATIENT
Start: 2024-06-19 | End: 2024-06-22 | Stop reason: HOSPADM

## 2024-06-19 RX ORDER — IPRATROPIUM BROMIDE AND ALBUTEROL SULFATE 2.5; .5 MG/3ML; MG/3ML
3 SOLUTION RESPIRATORY (INHALATION) ONCE
Status: COMPLETED | OUTPATIENT
Start: 2024-06-19 | End: 2024-06-19

## 2024-06-19 RX ORDER — ASPIRIN 81 MG
1 TABLET, DELAYED RELEASE (ENTERIC COATED) ORAL DAILY
Status: DISCONTINUED | OUTPATIENT
Start: 2024-06-20 | End: 2024-06-22 | Stop reason: HOSPADM

## 2024-06-19 RX ORDER — METHYLPREDNISOLONE SODIUM SUCCINATE 40 MG/ML
40 INJECTION, POWDER, LYOPHILIZED, FOR SOLUTION INTRAMUSCULAR; INTRAVENOUS EVERY 8 HOURS
Status: DISCONTINUED | OUTPATIENT
Start: 2024-06-20 | End: 2024-06-22 | Stop reason: HOSPADM

## 2024-06-19 RX ORDER — GUAIFENESIN 200 MG/10ML
200 LIQUID ORAL EVERY 4 HOURS PRN
Status: DISCONTINUED | OUTPATIENT
Start: 2024-06-19 | End: 2024-06-22 | Stop reason: HOSPADM

## 2024-06-19 RX ORDER — SODIUM CHLORIDE 0.9 % (FLUSH) 0.9 %
5-40 SYRINGE (ML) INJECTION PRN
Status: DISCONTINUED | OUTPATIENT
Start: 2024-06-19 | End: 2024-06-22 | Stop reason: HOSPADM

## 2024-06-19 RX ORDER — POLYETHYLENE GLYCOL 3350 17 G/17G
17 POWDER, FOR SOLUTION ORAL DAILY PRN
Status: DISCONTINUED | OUTPATIENT
Start: 2024-06-19 | End: 2024-06-22 | Stop reason: HOSPADM

## 2024-06-19 RX ADMIN — DOXYCYCLINE 100 MG: 100 INJECTION, POWDER, LYOPHILIZED, FOR SOLUTION INTRAVENOUS at 18:00

## 2024-06-19 RX ADMIN — IPRATROPIUM BROMIDE AND ALBUTEROL SULFATE 3 DOSE: 2.5; .5 SOLUTION RESPIRATORY (INHALATION) at 18:28

## 2024-06-19 RX ADMIN — BUDESONIDE 500 MCG: 0.5 SUSPENSION RESPIRATORY (INHALATION) at 23:24

## 2024-06-19 RX ADMIN — CEFTRIAXONE SODIUM 1000 MG: 1 INJECTION, POWDER, FOR SOLUTION INTRAMUSCULAR; INTRAVENOUS at 17:51

## 2024-06-19 RX ADMIN — IOPAMIDOL 100 ML: 755 INJECTION, SOLUTION INTRAVENOUS at 20:56

## 2024-06-19 RX ADMIN — SODIUM CHLORIDE, PRESERVATIVE FREE 10 ML: 5 INJECTION INTRAVENOUS at 23:49

## 2024-06-19 RX ADMIN — IPRATROPIUM BROMIDE AND ALBUTEROL SULFATE 3 DOSE: .5; 2.5 SOLUTION RESPIRATORY (INHALATION) at 18:28

## 2024-06-19 ASSESSMENT — PAIN - FUNCTIONAL ASSESSMENT: PAIN_FUNCTIONAL_ASSESSMENT: NONE - DENIES PAIN

## 2024-06-19 NOTE — ED NOTES
Pt transitioned to 4L nasal cannula for a trial to wean off bipap per provider requests. RT called to assess.

## 2024-06-19 NOTE — ED PROVIDER NOTES
EMERGENCY DEPARTMENT HISTORY AND PHYSICAL EXAM      Date: 6/19/2024  Patient Name: Mey Lawrence  MRN: [id number]  YOB: 1940  Date of evaluation: 6/19/2024  Provider: Markus Gibson MD     History of Present Illness     Chief Complaint   Patient presents with    Shortness of Breath       History Provided By: Patient, Daughter    HPI: Mey Lawrence, 84 y.o. female with past medical history as listed and reviewed below presenting to the ED for evaluation of shortness of breath.  Patient has a history of non-small cell lung cancer that is in remission, she is on not on chemotherapy.  She presents to the ED today for shortness of breath, reportedly febrile with EMS.  Reportedly had exposure to another family member who had similar respiratory symptoms in the household.  Patient denies any chest pain.  He was given 3 rounds of albuterol with EMS as well as 10 of Decadron without much improvement in symptoms, started on CPAP.  Initial saturations 85%.    Medical History     Past Medical History:  Past Medical History:   Diagnosis Date    Chronic obstructive pulmonary disease (HCC)     Hypertension     Hypothyroidism     Lung cancer (HCC)        Past Surgical History:  No past surgical history on file.    Family History:  No family history on file.    Social History:  Social History     Tobacco Use    Smoking status: Never    Smokeless tobacco: Never   Vaping Use    Vaping Use: Never used   Substance Use Topics    Alcohol use: Never    Drug use: Never       Allergies:  Allergies   Allergen Reactions    Banana Rash       PCP: Bela Jolley MD    Current Medications:   No current facility-administered medications for this encounter.     Current Outpatient Medications   Medication Sig Dispense Refill    albuterol sulfate HFA (PROVENTIL;VENTOLIN;PROAIR) 108 (90 Base) MCG/ACT inhaler Inhale 2 puffs into the lungs 4 times daily 18 g 3    budesonide (PULMICORT) 0.5 MG/2ML nebulizer suspension Take 2 mLs  by nebulization 2 times daily 60 each 3    ipratropium 0.5 mg-albuterol 2.5 mg (DUONEB) 0.5-2.5 (3) MG/3ML SOLN nebulizer solution Inhale 3 mLs into the lungs every 4 hours as needed for Shortness of Breath 360 mL 0    guaiFENesin (MUCINEX) 600 MG extended release tablet Take 1 tablet by mouth 2 times daily 30 tablet 0    torsemide (DEMADEX) 5 MG tablet Take 1 tablet by mouth daily      metoprolol succinate (TOPROL XL) 25 MG extended release tablet Take 1 tablet by mouth daily      levocetirizine (XYZAL) 5 MG tablet Take 1 tablet by mouth daily      SYNTHROID 88 MCG tablet One pill a day daily but on Sundays 2 pills a day before b-fast 105 tablet 3    apixaban (ELIQUIS) 2.5 MG TABS tablet Take 1 tablet by mouth 2 times daily      calcium carb-cholecalciferol 600-10 MG-MCG TABS per tab Take 1 tablet by mouth daily      Cholecalciferol 50 MCG (2000 UT) TABS Take by mouth      ferrous sulfate (SLOW FE) 142 (45 Fe) MG extended release tablet Take by mouth daily      folic acid (FOLVITE) 1 MG tablet Take 1 tablet by mouth daily      ondansetron (ZOFRAN) 8 MG tablet ondansetron HCl 8 mg tablet   TAKE 1 TABLET BY MOUTH EVERY 8 HOURS AS NEEDED FOR NAUSEA      potassium chloride (KLOR-CON) 10 MEQ extended release tablet Take 2 tablets by mouth daily         Social Determinants of Health:   Social Determinants of Health     Tobacco Use: Low Risk  (3/22/2024)    Patient History     Smoking Tobacco Use: Never     Smokeless Tobacco Use: Never     Passive Exposure: Not on file   Alcohol Use: Not At Risk (6/19/2024)    AUDIT-C     Frequency of Alcohol Consumption: Never     Average Number of Drinks: Patient does not drink     Frequency of Binge Drinking: Never   Financial Resource Strain: Not on file   Food Insecurity: No Food Insecurity (4/22/2024)    Hunger Vital Sign     Worried About Running Out of Food in the Last Year: Never true     Ran Out of Food in the Last Year: Never true   Transportation Needs: No Transportation Needs

## 2024-06-19 NOTE — ED TRIAGE NOTES
Arrives via EMS from home with complaints of shortness of breath that started today, arrives on CPAP machine; tachypneic, febrile and tachycardic with EMS, arrives on CPAP with sats 85-97%. Hx COPD, lung cancer     Sepsis alert called PTA

## 2024-06-19 NOTE — ED NOTES
Assumed care of pt at this time. Pt on bipap. Family at bedside. Pt on continuous cardiac monitoring, pulse ox, and blood pressure monitoring at this time.

## 2024-06-20 ENCOUNTER — APPOINTMENT (OUTPATIENT)
Facility: HOSPITAL | Age: 84
DRG: 193 | End: 2024-06-20
Payer: MEDICARE

## 2024-06-20 ENCOUNTER — APPOINTMENT (OUTPATIENT)
Facility: HOSPITAL | Age: 84
DRG: 193 | End: 2024-06-20
Attending: INTERNAL MEDICINE
Payer: MEDICARE

## 2024-06-20 LAB
ANION GAP SERPL CALC-SCNC: 9 MMOL/L (ref 5–15)
APPEARANCE FLD: ABNORMAL
APPEARANCE UR: CLEAR
BACTERIA URNS QL MICRO: NEGATIVE /HPF
BASOPHILS # BLD: 0 K/UL (ref 0–0.1)
BASOPHILS NFR BLD: 0 % (ref 0–1)
BILIRUB UR QL: NEGATIVE
BODY FLD TYPE: NORMAL
BUN SERPL-MCNC: 19 MG/DL (ref 6–20)
BUN/CREAT SERPL: 24 (ref 12–20)
CA-I BLD-MCNC: 9.5 MG/DL (ref 8.5–10.1)
CHLORIDE SERPL-SCNC: 103 MMOL/L (ref 97–108)
CO2 SERPL-SCNC: 24 MMOL/L (ref 21–32)
COLOR FLD: YELLOW
COLOR UR: NORMAL
CREAT SERPL-MCNC: 0.79 MG/DL (ref 0.55–1.02)
DIFFERENTIAL METHOD BLD: ABNORMAL
EKG ATRIAL RATE: 123 BPM
EKG DIAGNOSIS: NORMAL
EKG P AXIS: 82 DEGREES
EKG P-R INTERVAL: 138 MS
EKG Q-T INTERVAL: 310 MS
EKG QRS DURATION: 64 MS
EKG QTC CALCULATION (BAZETT): 443 MS
EKG R AXIS: 63 DEGREES
EKG T AXIS: 56 DEGREES
EKG VENTRICULAR RATE: 123 BPM
EOSINOPHIL # BLD: 0 K/UL (ref 0–0.4)
EOSINOPHIL NFR BLD: 0 % (ref 0–7)
EOSINOPHIL NFR FLD MANUAL: 0 %
EOSINOPHIL NFR FLD MANUAL: ABNORMAL %
EPITH CASTS URNS QL MICRO: NORMAL /LPF
ERYTHROCYTE [DISTWIDTH] IN BLOOD BY AUTOMATED COUNT: 14.7 % (ref 11.5–14.5)
GLUCOSE SERPL-MCNC: 125 MG/DL (ref 65–100)
GLUCOSE UR STRIP.AUTO-MCNC: NEGATIVE MG/DL
HCT VFR BLD AUTO: 31.1 % (ref 35–47)
HGB BLD-MCNC: 10.5 G/DL (ref 11.5–16)
HGB UR QL STRIP: NEGATIVE
IMM GRANULOCYTES # BLD AUTO: 0 K/UL (ref 0–0.04)
IMM GRANULOCYTES NFR BLD AUTO: 1 % (ref 0–0.5)
KETONES UR QL STRIP.AUTO: NEGATIVE MG/DL
LDH FLD L TO P-CCNC: 98 U/L
LEUKOCYTE ESTERASE UR QL STRIP.AUTO: NEGATIVE
LYMPHOCYTES # BLD: 0.7 K/UL (ref 0.8–3.5)
LYMPHOCYTES NFR BLD: 11 % (ref 12–49)
LYMPHOCYTES NFR FLD: 6 %
LYMPHOCYTES NFR FLD: ABNORMAL %
M PNEUMO IGM SER IA-ACNC: NONREACTIVE
MCH RBC QN AUTO: 29.7 PG (ref 26–34)
MCHC RBC AUTO-ENTMCNC: 33.8 G/DL (ref 30–36.5)
MCV RBC AUTO: 87.9 FL (ref 80–99)
MESOTHL CELL NFR FLD: 85 %
MONOCYTES # BLD: 0.7 K/UL (ref 0–1)
MONOCYTES NFR BLD: 10 % (ref 5–13)
MONOCYTES NFR FLD: 7 %
MONOCYTES NFR FLD: ABNORMAL %
NEUTROPHILS NFR FLD: 2 %
NEUTROPHILS NFR FLD: ABNORMAL %
NEUTS BAND # FLD: 0 %
NEUTS BAND # FLD: ABNORMAL %
NEUTS SEG # BLD: 5.1 K/UL (ref 1.8–8)
NEUTS SEG NFR BLD: 78 % (ref 32–75)
NITRITE UR QL STRIP.AUTO: NEGATIVE
NRBC # BLD: 0 K/UL (ref 0–0.01)
NRBC BLD-RTO: 0 PER 100 WBC
NUC CELL # FLD: 302 /CU MM
PH FLD: 7
PH UR STRIP: 6 (ref 5–8)
PLATELET # BLD AUTO: 249 K/UL (ref 150–400)
PMV BLD AUTO: 10.4 FL (ref 8.9–12.9)
POTASSIUM SERPL-SCNC: 3.6 MMOL/L (ref 3.5–5.1)
PROT FLD-MCNC: 3.3 G/DL
PROT UR STRIP-MCNC: NEGATIVE MG/DL
RBC # BLD AUTO: 3.54 M/UL (ref 3.8–5.2)
RBC # FLD: >100 /CU MM
RBC #/AREA URNS HPF: NORMAL /HPF (ref 0–5)
SODIUM SERPL-SCNC: 136 MMOL/L (ref 136–145)
SP GR UR REFRACTOMETRY: 1.02 (ref 1–1.03)
SPECIMEN SOURCE FLD: ABNORMAL
SPECIMEN SOURCE FLD: NORMAL
TRIGL FLD-MCNC: <15 MG/DL
URINE CULTURE IF INDICATED: NORMAL
UROBILINOGEN UR QL STRIP.AUTO: 0.1 EU/DL (ref 0.1–1)
WBC # BLD AUTO: 6.5 K/UL (ref 3.6–11)
WBC URNS QL MICRO: NORMAL /HPF (ref 0–4)

## 2024-06-20 PROCEDURE — 80048 BASIC METABOLIC PNL TOTAL CA: CPT

## 2024-06-20 PROCEDURE — 71045 X-RAY EXAM CHEST 1 VIEW: CPT

## 2024-06-20 PROCEDURE — 86738 MYCOPLASMA ANTIBODY: CPT

## 2024-06-20 PROCEDURE — 87449 NOS EACH ORGANISM AG IA: CPT

## 2024-06-20 PROCEDURE — 87070 CULTURE OTHR SPECIMN AEROBIC: CPT

## 2024-06-20 PROCEDURE — 6360000002 HC RX W HCPCS: Performed by: INTERNAL MEDICINE

## 2024-06-20 PROCEDURE — 83986 ASSAY PH BODY FLUID NOS: CPT

## 2024-06-20 PROCEDURE — 6370000000 HC RX 637 (ALT 250 FOR IP): Performed by: INTERNAL MEDICINE

## 2024-06-20 PROCEDURE — 89051 BODY FLUID CELL COUNT: CPT

## 2024-06-20 PROCEDURE — 2580000003 HC RX 258: Performed by: INTERNAL MEDICINE

## 2024-06-20 PROCEDURE — 87205 SMEAR GRAM STAIN: CPT

## 2024-06-20 PROCEDURE — 84157 ASSAY OF PROTEIN OTHER: CPT

## 2024-06-20 PROCEDURE — 87015 SPECIMEN INFECT AGNT CONCNTJ: CPT

## 2024-06-20 PROCEDURE — 1100000000 HC RM PRIVATE

## 2024-06-20 PROCEDURE — 94761 N-INVAS EAR/PLS OXIMETRY MLT: CPT

## 2024-06-20 PROCEDURE — 88112 CYTOPATH CELL ENHANCE TECH: CPT

## 2024-06-20 PROCEDURE — 32555 ASPIRATE PLEURA W/ IMAGING: CPT

## 2024-06-20 PROCEDURE — 0W9B3ZZ DRAINAGE OF LEFT PLEURAL CAVITY, PERCUTANEOUS APPROACH: ICD-10-PCS | Performed by: STUDENT IN AN ORGANIZED HEALTH CARE EDUCATION/TRAINING PROGRAM

## 2024-06-20 PROCEDURE — 85025 COMPLETE CBC W/AUTO DIFF WBC: CPT

## 2024-06-20 PROCEDURE — 94640 AIRWAY INHALATION TREATMENT: CPT

## 2024-06-20 PROCEDURE — 88305 TISSUE EXAM BY PATHOLOGIST: CPT

## 2024-06-20 PROCEDURE — 2700000000 HC OXYGEN THERAPY PER DAY

## 2024-06-20 PROCEDURE — 83615 LACTATE (LD) (LDH) ENZYME: CPT

## 2024-06-20 PROCEDURE — 84478 ASSAY OF TRIGLYCERIDES: CPT

## 2024-06-20 PROCEDURE — 6360000002 HC RX W HCPCS: Performed by: STUDENT IN AN ORGANIZED HEALTH CARE EDUCATION/TRAINING PROGRAM

## 2024-06-20 RX ORDER — HYDROXYZINE HYDROCHLORIDE 25 MG/ML
12.5 INJECTION, SOLUTION INTRAMUSCULAR
Status: COMPLETED | OUTPATIENT
Start: 2024-06-20 | End: 2024-06-20

## 2024-06-20 RX ADMIN — BUDESONIDE 500 MCG: 0.5 SUSPENSION RESPIRATORY (INHALATION) at 20:23

## 2024-06-20 RX ADMIN — METOPROLOL SUCCINATE 25 MG: 25 TABLET, EXTENDED RELEASE ORAL at 10:01

## 2024-06-20 RX ADMIN — HYDROXYZINE HYDROCHLORIDE 12.5 MG: 25 INJECTION, SOLUTION INTRAMUSCULAR at 16:32

## 2024-06-20 RX ADMIN — Medication 1000 UNITS: at 10:01

## 2024-06-20 RX ADMIN — CEFTRIAXONE SODIUM 1000 MG: 1 INJECTION, POWDER, FOR SOLUTION INTRAMUSCULAR; INTRAVENOUS at 21:49

## 2024-06-20 RX ADMIN — METHYLPREDNISOLONE SODIUM SUCCINATE 40 MG: 40 INJECTION INTRAMUSCULAR; INTRAVENOUS at 14:29

## 2024-06-20 RX ADMIN — Medication 1 G: at 10:01

## 2024-06-20 RX ADMIN — LEVOTHYROXINE SODIUM 100 MCG: 0.1 TABLET ORAL at 10:13

## 2024-06-20 RX ADMIN — FOLIC ACID 1 MG: 1 TABLET ORAL at 10:01

## 2024-06-20 RX ADMIN — APIXABAN 2.5 MG: 2.5 TABLET, FILM COATED ORAL at 10:00

## 2024-06-20 RX ADMIN — SODIUM CHLORIDE, PRESERVATIVE FREE 10 ML: 5 INJECTION INTRAVENOUS at 10:01

## 2024-06-20 RX ADMIN — SODIUM CHLORIDE, PRESERVATIVE FREE 10 ML: 5 INJECTION INTRAVENOUS at 21:24

## 2024-06-20 RX ADMIN — Medication 1 TABLET: at 10:13

## 2024-06-20 RX ADMIN — AZITHROMYCIN MONOHYDRATE 500 MG: 500 INJECTION, POWDER, LYOPHILIZED, FOR SOLUTION INTRAVENOUS at 10:00

## 2024-06-20 RX ADMIN — IPRATROPIUM BROMIDE AND ALBUTEROL SULFATE 1 DOSE: .5; 2.5 SOLUTION RESPIRATORY (INHALATION) at 03:44

## 2024-06-20 RX ADMIN — METHYLPREDNISOLONE SODIUM SUCCINATE 40 MG: 40 INJECTION INTRAMUSCULAR; INTRAVENOUS at 21:50

## 2024-06-20 RX ADMIN — APIXABAN 2.5 MG: 2.5 TABLET, FILM COATED ORAL at 20:54

## 2024-06-20 RX ADMIN — CETIRIZINE HYDROCHLORIDE 10 MG: 10 TABLET, FILM COATED ORAL at 10:01

## 2024-06-20 RX ADMIN — METHYLPREDNISOLONE SODIUM SUCCINATE 40 MG: 40 INJECTION INTRAMUSCULAR; INTRAVENOUS at 06:10

## 2024-06-20 RX ADMIN — FERROUS SULFATE TAB 325 MG (65 MG ELEMENTAL FE) 325 MG: 325 (65 FE) TAB at 10:01

## 2024-06-20 RX ADMIN — BUDESONIDE 500 MCG: 0.5 SUSPENSION RESPIRATORY (INHALATION) at 07:47

## 2024-06-20 NOTE — WOUND CARE
Wound Care Note:      Wound Care into see patient because of Erythema to sacrum    Patient is resting in bed, denies any pain to sacral area. Patient is able to turn self.     Blanchable erythema noted to sacrum with skin intact. Recommend applying sacral foam dressing to sacrum for protection. Dressing to be changed every 3 days and PRN for soiling, nurse to peel back daily for skin assessment.         Recommend applying foam dressing to sacrum and bilateral heels for protection. Dressing to be changed every 3 days and PRN for soiling, nurse to peel back dressing daily for skin assessment.     Float heels on pillow at all times while in bed, place pillow long ways under patient leg so knee is supported and heel is hanging off edge of pillow.      Use foam body alignment wedge to turn patient q2h at 30 degree angle to offload sacrum to prevent pressure related skin injury.  Do not position directly on greater trochanter.           Please re-consult for any changes in skin condition.

## 2024-06-20 NOTE — PROGRESS NOTES
Unable to complete home medications presently. Patient she does not remember all her medications. Rashmi can give that information.

## 2024-06-20 NOTE — PROGRESS NOTES
4 Eyes Skin Assessment     NAME:  Mey Lawrence  YOB: 1940  MEDICAL RECORD NUMBER:  [id number]    The patient is being assessed for  Admission    I agree that at least one RN has performed a thorough Head to Toe Skin Assessment on the patient. ALL assessment sites listed below have been assessed.      Areas assessed by both nurses:    Head, Face, Ears, Shoulders, Back, Chest, Arms, Elbows, Hands, Sacrum. Buttock, Coccyx, Ischium, and Legs. Feet and Heels; ppx mepilex to nontender, red blanchable area on sacrum        Does the Patient have a Wound? No noted wound(s) Redness to sacral area. ? Blanchable       Eriberto Prevention initiated by RN: Yes  Wound Care Orders initiated by RN: Yes    Pressure Injury (Stage 3,4, Unstageable, DTI, NWPT, and Complex wounds) if present, place Wound referral order by RN under : No    New Ostomies, if present place, Ostomy referral order under : No     Nurse 1 eSignature: Electronically signed by Zacarias Steele RN on 6/20/24 at 1:38 AM EDT    **SHARE this note so that the co-signing nurse can place an eSignature**    Nurse 2 eSignature: Electronically signed by Cliff Turk RN on 6/20/24 at 1:39 AM EDT

## 2024-06-20 NOTE — PROGRESS NOTES
Comprehensive Nutrition Assessment    Type and Reason for Visit:  Initial (BMI)    Nutrition Recommendations/Plan:   Continue Current Diet  Initiate ONS BID    Monitor leonard wt changes, PO intake and ONS tolerance     Malnutrition Assessment:  Malnutrition Status:  Mild malnutrition (06/20/24 1419)    Context:  Chronic Illness     Findings of the 6 clinical characteristics of malnutrition:  Energy Intake:  No significant decrease in energy intake  Weight Loss:  Greater than 10% over 6 months     Body Fat Loss:  Unable to assess     Muscle Mass Loss:  Unable to assess    Fluid Accumulation:  Unable to assess     Strength:  Not Performed    Nutrition Assessment:    Rd assessed for BMI. Pt reports having a good appetite w 100% PO intake. PLan to stat Ensure BID to address decreased wt. Meds reviewed. Abnormal labs include BUN 24 Glucos 125 Hgb 10.5 Hct 31.1.    Nutrition Related Findings:    NFPE deffered but visually appears malnourished. No N/V/D/C. No trouble chewing or swallowing. LBM 6/19. Wound Type: None       Current Nutrition Intake & Therapies:    Average Meal Intake: %  Average Supplements Intake: None Ordered  ADULT DIET; Regular  ADULT ORAL NUTRITION SUPPLEMENT; Breakfast, Dinner; Standard High Calorie/High Protein Oral Supplement    Anthropometric Measures:  Height: 157.5 cm (5' 2\")  Ideal Body Weight (IBW): 110 lbs (50 kg)       Current Body Weight: 42.6 kg (94 lb),   IBW.    Current BMI (kg/m2): 17.2  Usual Body Weight: 56.7 kg (125 lb)  % Weight Change (Calculated): -24.8  BMI Categories: Underweight (BMI less than 18.5)    Estimated Daily Nutrient Needs:  Energy Requirements Based On: Kcal/kg  Weight Used for Energy Requirements: Current  Energy (kcal/day): 1300 - 1500 kcal ( 30-35 kcal / CBW)  Weight Used for Protein Requirements: Current  Protein (g/day): 65- 85 g ( 1.5 - 2 g /CBW)     Fluid (ml/day): 1300 - 1500 mL/Kcal    Nutrition Diagnosis:   Increased nutrient needs related to

## 2024-06-20 NOTE — PROGRESS NOTES
Patient is uncooperative at this time. Pulling off tele box, trying to get out bed and pulling at iv access. Reorient patient. Continues to be uncooperative. Complaining that she can not sleep with tele box

## 2024-06-20 NOTE — OR NURSING
300 cc clear yellow fluid drained from left posterior chest. Fluids to lab for analysis as ordered by Dr Berrios.

## 2024-06-20 NOTE — CARE COORDINATION
06/20/24 1013   Service Assessment   Patient Orientation Person;Place   Cognition Dementia / Early Alzheimer's   History Provided By Child/Family   Primary Caregiver Family   Support Systems Children;Family Members   Patient's Healthcare Decision Maker is: Legal Next of Kin   PCP Verified by CM Yes   Last Visit to PCP Within last 3 months   Prior Functional Level Independent in ADLs/IADLs   Current Functional Level Independent in ADLs/IADLs   Can patient return to prior living arrangement Yes   Ability to make needs known: Unable   Family able to assist with home care needs: Yes   Would you like for me to discuss the discharge plan with any other family members/significant others, and if so, who? Yes  (DAUGHTER)     Advance Care Planning     General Advance Care Planning (ACP) Conversation    Date of Conversation: 6/20/2024  Conducted with: Legal next of kin  Other persons present: None    Healthcare Decision Maker:   Primary Decision Maker: Rashmi Whiting - Child - [phone]  Click here to complete Healthcare Decision Makers including selection of the Healthcare Decision Maker Relationship (ie \"Primary\").   Today we documented Decision Maker(s) consistent with Legal Next of Kin hierarchy.    Length of Voluntary ACP Conversation in minutes:  <16 minutes (Non-Billable)    CHASTITY Scales           Pt not oriented, CM spoke w/ pt's daughter, charted above, who indicated that the pt lives with her in a single story home w/ 7 STE. Pt has home O2 from [employer], but daughter reports pt does not use it. No other DME, no hh/rehab hx. Charted PCP is correct, pt uses [facility] in [place]. Declines [id number].

## 2024-06-20 NOTE — ED NOTES
ED TO INPATIENT SBAR HANDOFF    Patient Name: Mey Lawrence   Preferred Name: Mey  : 1940  84 y.o.   Family/Caregiver Present: no   Code Status Order: DNR  PO Status: regular  Telemetry Order:   C-SSRS: Risk of Suicide: No Risk  Sitter no Safety (possibly; nursing sup aware of the possibility of pt needing one)  Restraints:     Sepsis Risk Score      Situation  Chief Complaint   Patient presents with    Shortness of Breath     Brief Description of Patient's Condition: pt arrived SOB, wears 3L at baseline, arrived on cpap satting in 80s. Pt put on bipap on arrival and weaned off around 20:00.   Mental Status: oriented and alert  Arrived from:Home  Imaging:   CTA CHEST W WO CONTRAST   Final Result      1. Multiple foci of groundglass and semisolid opacification, most likely   reflecting foci of infection or inflammation. A few these areas have a more   solid nodular appearance, recommend imaging follow-up to assess for resolution.   2. Complete collapse of the left lower lobe chronic, unchanged compared to 2023.   3. Small right and large left pleural effusions, with unchanged areas of pleural   thickening on the left, debris versus neoplasm.   4. No pulmonary embolism.      Electronically signed by SHAWN KUMAR      XR CHEST 1 VIEW   Final Result      Limited by patient rotation. Unchanged moderate left pleural effusion. No   pneumothorax. Consider PA and lateral chest views when the patient can better   tolerate.         Electronically signed by Jamie Enriquez        Abnormal labs:   Abnormal Labs Reviewed   CBC WITH AUTO DIFFERENTIAL - Abnormal; Notable for the following components:       Result Value    RDW 15.1 (*)     All other components within normal limits   COMPREHENSIVE METABOLIC PANEL - Abnormal; Notable for the following components:    Sodium 132 (*)     Potassium 5.3 (*)     BUN/Creatinine Ratio 24 (*)     AST 42 (*)     Albumin 3.1 (*)     Globulin 4.5 (*)     Albumin/Globulin  Ratio 0.7 (*)     All other components within normal limits   BRAIN NATRIURETIC PEPTIDE - Abnormal; Notable for the following components:    NT Pro-BNP 1,277 (*)     All other components within normal limits   PROCALCITONIN - Abnormal; Notable for the following components:    Procalcitonin <0.05 (*)     All other components within normal limits   C-REACTIVE PROTEIN - Abnormal; Notable for the following components:    CRP 4.49 (*)     All other components within normal limits   POC BLOOD GAS AND CHEMISTRY - Abnormal; Notable for the following components:    POC PO2 37 (*)     POC Sodium 135 (*)     POC Chloride 96 (*)     All other components within normal limits       Background  Allergies:   Allergies   Allergen Reactions    Banana Rash     History:   Past Medical History:   Diagnosis Date    Chronic obstructive pulmonary disease (HCC)     Hypertension     Hypothyroidism     Lung cancer (HCC)        Assessment  Vitals: MEWS Score: 4  Level of Consciousness: Alert (0)   Vitals:    06/19/24 2314 06/19/24 2315 06/19/24 2332 06/19/24 2356   BP:       Pulse: (!) 109 (!) 109 (!) 106 (!) 104   Resp: 20 21 25 22   Temp:       TempSrc:       SpO2: 100% 100% 100% 99%   Weight:       Height:         Deterioration Index (DI): Deterioration Index: (!) 49.36  Deterioration Index (DI) Interventions Performed: Deterioration Index RN Interventions Performed : Charge RN Notified, Provider Notified of Clinical Concerns  O2 Flow Rate: O2 Flow Rate (L/min): 2 L/min  O2 Device: O2 Device: Nasal cannula  Cardiac Rhythm:    Critical Lab Results: [unfilled]  Cultures: Cultures:Blood, Flu, and Covid  NIH Score: NIH     Active LDA's:   Peripheral IV 06/19/24 Distal;Posterior;Left Forearm (Active)       Peripheral IV 06/19/24 Right Antecubital (Active)     Active Central Lines:                          Active Wounds:    Active Dan's:    Active Feeding Tubes:      Administered Medications:   Medications   budesonide (PULMICORT) nebulizer

## 2024-06-20 NOTE — PLAN OF CARE
Problem: Discharge Planning  Goal: Discharge to home or other facility with appropriate resources  Outcome: Progressing  Flowsheets  Taken 6/20/2024 0755 by Maribel Tran RN  Discharge to home or other facility with appropriate resources:   Identify barriers to discharge with patient and caregiver   Identify discharge learning needs (meds, wound care, etc)  Taken 6/20/2024 0123 by Zacarias Steele RN  Discharge to home or other facility with appropriate resources:   Identify barriers to discharge with patient and caregiver   Arrange for needed discharge resources and transportation as appropriate   Identify discharge learning needs (meds, wound care, etc)     Problem: Skin/Tissue Integrity  Goal: Absence of new skin breakdown  Description: 1.  Monitor for areas of redness and/or skin breakdown  2.  Assess vascular access sites hourly  3.  Every 4-6 hours minimum:  Change oxygen saturation probe site  4.  Every 4-6 hours:  If on nasal continuous positive airway pressure, respiratory therapy assess nares and determine need for appliance change or resting period.  Outcome: Progressing

## 2024-06-20 NOTE — H&P
History & Physical    Primary Care Provider: Bela Jolley MD    Chief complaint:   Chief Complaint   Patient presents with    Shortness of Breath        History of Presenting Illness:   Mey Lawrence is a 84 y.o. female with PMH of lung cancer in remission, COPD, DVT on anticoagulation, and other medical problems as below. Presented to the ED with chief complaint of shortness of breath for the past week. Associated with productive cough and shortness of breath. Also noted to be desaturating at home, not on home oxygen. Daughter also reports some wheezing. Reportedly had exposure to another family member who had similar respiratory symptoms in the household. Patient denies any chest pain. Given 3 rounds of albuterol with EMS as well as 10 of Decadron without much improvement in symptoms, started on CPAP. Initial saturations 85%.     In the ED, noted tachycardic. Managed to weaned off to 3L NC on my evaluation. No leukocytosis. ABG showed hypoxia w/o hypercapnia. CT showed multiple foci of ground glass opacity, most likely infection. Unchanged left lung collapse, large left pleural effusion. No pulmonary embolism.       Chart review: none          Past Medical History:   Diagnosis Date    Chronic obstructive pulmonary disease (HCC)     Hypertension     Hypothyroidism     Lung cancer (HCC)         No past surgical history on file.    No family history on file.     Social History     Socioeconomic History    Marital status:    Tobacco Use    Smoking status: Never    Smokeless tobacco: Never   Vaping Use    Vaping Use: Never used   Substance and Sexual Activity    Alcohol use: Never    Drug use: Never    Sexual activity: Not Currently     Partners: Male     Social Determinants of Health     Food Insecurity: No Food Insecurity (4/22/2024)    Hunger Vital Sign     Worried About Running Out of Food in the Last Year: Never true     Ran Out of Food in the Last Year: Never true   Transportation Needs: No

## 2024-06-20 NOTE — CONSULTS
Pulmonary/ CC Consult    Subjective:   Date of Consultation:  June 20, 2024  Referring Physician: Julio César Monson MD     Thanks for this consultation.  Ms. Mey Lawrence is an 84 years old  female who usually follows with my partner Dr. Patten in office.  She has known history of COPD, chronic hypoxia uses supplemental oxygen at home on as-needed basis, history of DVT on anticoagulation.  She additionally has history of lung cancer which seems to be in remission.  She has moved with her daughter because of recent symptoms of dementia.  She was brought to the hospital because of feeling of shortness of breath, has been feeling congested and bringing up sputum.  She was found to to be desaturating at home.  She was also noted to have wheezing.  Seems like she was exposed to other family member who was sick with infection and pneumonia.  She was noted to be hypoxic in the ER.  She was given back-to-back nebulizer treatments and was placed on 3 L of nasal cannula oxygen.  Chest x-ray was done and CT scan of chest was done which showed multiple foci of groundglass opacity suggestive of infection/pneumonia.  Also noted to have unchanged left lung collapse along with large left pleural effusion..     Patient Active Problem List   Diagnosis    Shortness of breath    COPD (chronic obstructive pulmonary disease) (HCC)    Elevated troponin    Lung cancer (HCC)    PNA (pneumonia)    Hyponatremia    CAP (community acquired pneumonia) due to Chlamydia species    Pneumonia due to other specified infectious organisms     Past Medical History:   Diagnosis Date    Chronic obstructive pulmonary disease (HCC)     Hypertension     Hypothyroidism     Lung cancer (HCC)       No family history on file.   Social History     Tobacco Use    Smoking status: Never    Smokeless tobacco: Never   Substance Use Topics    Alcohol use: Never     No past surgical history on file.   Prior to Admission medications    Medication Sig Start Date

## 2024-06-20 NOTE — PROGRESS NOTES
Hospitalist Progress Note    NAME:   Mey Lawrence   : 1940   MRN: [id number]     Date/Time: 2024 12:45 PM  Patient PCP: Bela Jolley MD    Estimated discharge date: 48 hours  Barriers: IV antibiotics, IV Solu-Medrol, pulmonology clearance, IR consult for left thoracentesis    Hospital course:  Patient is an 84-year-old female with a past medical history of lung cancer remission, COPD, hypertension, DVT on anticoagulation, chronic pleural effusion and hypothyroidism who was admitted on 2024 for acute on chronic hypoxic respiratory failure.  CXR revealed unchanged moderate left pleural effusion.  No pneumothorax.  CTA of the chest revealed multiple foci of groundglass and semisolid opacification, most likely reflecting foci of infection inflammation.  A few areas have more solid nodular appearance.  Complete collapse of left lower lobe which is chronic and unchanged from previous imaging in 2023.  Small right and large left pleural effusion with unchanged areas of pleural thickening on the left, debris versus neoplasm.  No pulmonary embolism.  Pulmonology evaluated patient and agreed with empiric coverage with IV Rocephin and azithromycin.  Also, agrees with continued albuterol, Atrovent and Solu-Medrol.  Patient has chronic left lower effusion and left lower lobe collapse so we will have IR consulted for left thoracentesis.    Assessment / Plan:  Acute on chronic hypoxic respiratory failure, currently on 2 L of supplemental oxygen via nasal cannula, which is baseline for patient  Community-acquired pneumonia  History of lung cancer in remission with chronic left pleural effusion  -Check blood and sputum culture, also, Legionella and mycoplasma  -COVID and influenza negative  -Continue IV Rocephin and azithromycin  -Continue IV Solu-Medrol  -Pulmonology evaluated patient and agreed with empiric coverage with IV Rocephin and azithromycin.  Also, agrees with continued albuterol,

## 2024-06-21 LAB
ANION GAP SERPL CALC-SCNC: 6 MMOL/L (ref 5–15)
BACTERIA SPEC CULT: NORMAL
BASOPHILS # BLD: 0 K/UL (ref 0–0.1)
BASOPHILS NFR BLD: 0 % (ref 0–1)
BUN SERPL-MCNC: 22 MG/DL (ref 6–20)
BUN/CREAT SERPL: 33 (ref 12–20)
CA-I BLD-MCNC: 9.2 MG/DL (ref 8.5–10.1)
CHLORIDE SERPL-SCNC: 106 MMOL/L (ref 97–108)
CO2 SERPL-SCNC: 26 MMOL/L (ref 21–32)
CREAT SERPL-MCNC: 0.66 MG/DL (ref 0.55–1.02)
CYTOLOGY-NON GYN: NORMAL
DIFFERENTIAL METHOD BLD: ABNORMAL
EOSINOPHIL # BLD: 0 K/UL (ref 0–0.4)
EOSINOPHIL NFR BLD: 0 % (ref 0–7)
ERYTHROCYTE [DISTWIDTH] IN BLOOD BY AUTOMATED COUNT: 14.9 % (ref 11.5–14.5)
GLUCOSE SERPL-MCNC: 104 MG/DL (ref 65–100)
GRAM STN SPEC: NORMAL
HCT VFR BLD AUTO: 31.3 % (ref 35–47)
HGB BLD-MCNC: 10.2 G/DL (ref 11.5–16)
IMM GRANULOCYTES # BLD AUTO: 0 K/UL (ref 0–0.04)
IMM GRANULOCYTES NFR BLD AUTO: 0 % (ref 0–0.5)
LYMPHOCYTES # BLD: 0.6 K/UL (ref 0.8–3.5)
LYMPHOCYTES NFR BLD: 8 % (ref 12–49)
Lab: NORMAL
MCH RBC QN AUTO: 28.9 PG (ref 26–34)
MCHC RBC AUTO-ENTMCNC: 32.6 G/DL (ref 30–36.5)
MCV RBC AUTO: 88.7 FL (ref 80–99)
MONOCYTES # BLD: 0.6 K/UL (ref 0–1)
MONOCYTES NFR BLD: 8 % (ref 5–13)
NEUTS SEG # BLD: 6.4 K/UL (ref 1.8–8)
NEUTS SEG NFR BLD: 84 % (ref 32–75)
NRBC # BLD: 0 K/UL (ref 0–0.01)
NRBC BLD-RTO: 0 PER 100 WBC
PLATELET # BLD AUTO: 247 K/UL (ref 150–400)
PMV BLD AUTO: 10.3 FL (ref 8.9–12.9)
POTASSIUM SERPL-SCNC: 4 MMOL/L (ref 3.5–5.1)
RBC # BLD AUTO: 3.53 M/UL (ref 3.8–5.2)
SODIUM SERPL-SCNC: 138 MMOL/L (ref 136–145)
WBC # BLD AUTO: 7.6 K/UL (ref 3.6–11)

## 2024-06-21 PROCEDURE — 6360000002 HC RX W HCPCS: Performed by: INTERNAL MEDICINE

## 2024-06-21 PROCEDURE — 1100000000 HC RM PRIVATE

## 2024-06-21 PROCEDURE — 80048 BASIC METABOLIC PNL TOTAL CA: CPT

## 2024-06-21 PROCEDURE — 94640 AIRWAY INHALATION TREATMENT: CPT

## 2024-06-21 PROCEDURE — 2580000003 HC RX 258: Performed by: INTERNAL MEDICINE

## 2024-06-21 PROCEDURE — 36415 COLL VENOUS BLD VENIPUNCTURE: CPT

## 2024-06-21 PROCEDURE — 85025 COMPLETE CBC W/AUTO DIFF WBC: CPT

## 2024-06-21 PROCEDURE — 2700000000 HC OXYGEN THERAPY PER DAY

## 2024-06-21 PROCEDURE — 6370000000 HC RX 637 (ALT 250 FOR IP): Performed by: INTERNAL MEDICINE

## 2024-06-21 PROCEDURE — 94760 N-INVAS EAR/PLS OXIMETRY 1: CPT

## 2024-06-21 RX ADMIN — Medication 1 G: at 08:59

## 2024-06-21 RX ADMIN — Medication 1000 UNITS: at 09:01

## 2024-06-21 RX ADMIN — METHYLPREDNISOLONE SODIUM SUCCINATE 40 MG: 40 INJECTION INTRAMUSCULAR; INTRAVENOUS at 05:30

## 2024-06-21 RX ADMIN — BUDESONIDE 500 MCG: 0.5 SUSPENSION RESPIRATORY (INHALATION) at 07:56

## 2024-06-21 RX ADMIN — FERROUS SULFATE TAB 325 MG (65 MG ELEMENTAL FE) 325 MG: 325 (65 FE) TAB at 09:01

## 2024-06-21 RX ADMIN — CEFTRIAXONE SODIUM 1000 MG: 1 INJECTION, POWDER, FOR SOLUTION INTRAMUSCULAR; INTRAVENOUS at 22:03

## 2024-06-21 RX ADMIN — CETIRIZINE HYDROCHLORIDE 10 MG: 10 TABLET, FILM COATED ORAL at 08:59

## 2024-06-21 RX ADMIN — SODIUM CHLORIDE, PRESERVATIVE FREE 10 ML: 5 INJECTION INTRAVENOUS at 09:01

## 2024-06-21 RX ADMIN — AZITHROMYCIN MONOHYDRATE 500 MG: 500 INJECTION, POWDER, LYOPHILIZED, FOR SOLUTION INTRAVENOUS at 09:00

## 2024-06-21 RX ADMIN — APIXABAN 2.5 MG: 2.5 TABLET, FILM COATED ORAL at 21:49

## 2024-06-21 RX ADMIN — METHYLPREDNISOLONE SODIUM SUCCINATE 40 MG: 40 INJECTION INTRAMUSCULAR; INTRAVENOUS at 13:46

## 2024-06-21 RX ADMIN — METOPROLOL SUCCINATE 25 MG: 25 TABLET, EXTENDED RELEASE ORAL at 08:59

## 2024-06-21 RX ADMIN — METHYLPREDNISOLONE SODIUM SUCCINATE 40 MG: 40 INJECTION INTRAMUSCULAR; INTRAVENOUS at 21:50

## 2024-06-21 RX ADMIN — APIXABAN 2.5 MG: 2.5 TABLET, FILM COATED ORAL at 08:59

## 2024-06-21 RX ADMIN — Medication 1 TABLET: at 08:59

## 2024-06-21 RX ADMIN — SODIUM CHLORIDE, PRESERVATIVE FREE 10 ML: 5 INJECTION INTRAVENOUS at 22:01

## 2024-06-21 RX ADMIN — FOLIC ACID 1 MG: 1 TABLET ORAL at 08:59

## 2024-06-21 RX ADMIN — BUDESONIDE 500 MCG: 0.5 SUSPENSION RESPIRATORY (INHALATION) at 20:26

## 2024-06-21 RX ADMIN — LEVOTHYROXINE SODIUM 100 MCG: 0.1 TABLET ORAL at 05:30

## 2024-06-21 NOTE — PLAN OF CARE
Problem: Discharge Planning  Goal: Discharge to home or other facility with appropriate resources  6/20/2024 2151 by Lacey Edward RN  Outcome: Progressing  6/20/2024 0809 by Maribel Tran RN  Outcome: Progressing  Flowsheets  Taken 6/20/2024 0755 by Maribel Tran, RN  Discharge to home or other facility with appropriate resources:   Identify barriers to discharge with patient and caregiver   Identify discharge learning needs (meds, wound care, etc)  Taken 6/20/2024 0123 by Zacarias Steele RN  Discharge to home or other facility with appropriate resources:   Identify barriers to discharge with patient and caregiver   Arrange for needed discharge resources and transportation as appropriate   Identify discharge learning needs (meds, wound care, etc)     Problem: Skin/Tissue Integrity  Goal: Absence of new skin breakdown  Description: 1.  Monitor for areas of redness and/or skin breakdown  2.  Assess vascular access sites hourly  3.  Every 4-6 hours minimum:  Change oxygen saturation probe site  4.  Every 4-6 hours:  If on nasal continuous positive airway pressure, respiratory therapy assess nares and determine need for appliance change or resting period.  6/20/2024 2151 by Lacey Edward, RN  Outcome: Progressing  6/20/2024 0809 by Maribel Tran, RN  Outcome: Progressing     Problem: Safety - Adult  Goal: Free from fall injury  Outcome: Progressing

## 2024-06-21 NOTE — PROGRESS NOTES
Pulmonary/ CC progress note  Ms. Mey Lawrence is an 84 years old  female who usually follows with my partner Dr. Patten in office.  She has known history of COPD, chronic hypoxia uses supplemental oxygen at home on as-needed basis, history of DVT on anticoagulation.  She additionally has history of lung cancer which seems to be in remission.  She has moved with her daughter because of recent symptoms of dementia.  She was brought to the hospital because of feeling of shortness of breath, has been feeling congested and bringing up sputum.  She was found to to be desaturating at home.  She was also noted to have wheezing.  Seems like she was exposed to other family member who was sick with infection and pneumonia.  She was noted to be hypoxic in the ER.  She was given back-to-back nebulizer treatments and was placed on 3 L of nasal cannula oxygen.  Chest x-ray was done and CT scan of chest was done which showed multiple foci of groundglass opacity suggestive of infection/pneumonia.  Also noted to have unchanged left lung collapse along with large left pleural effusion..   Subjective:   Patient examined at bedside  She is responding appropriately.  On nasal cannula oxygen.  Denies any chest pains, palpitations or any wheezing.  Physical therapy evaluated her.  She underwent ultrasound-guided left thoracentesis, 300 cc of fluid was drained  Patient Active Problem List   Diagnosis    Shortness of breath    COPD (chronic obstructive pulmonary disease) (HCC)    Elevated troponin    Lung cancer (HCC)    PNA (pneumonia)    Hyponatremia    CAP (community acquired pneumonia) due to Chlamydia species    Pneumonia due to other specified infectious organisms     Past Medical History:   Diagnosis Date    Chronic obstructive pulmonary disease (HCC)     Hypertension     Hypothyroidism     Lung cancer (HCC)       No family history on file.   Social History     Tobacco Use    Smoking status: Never    Smokeless tobacco:

## 2024-06-21 NOTE — PROGRESS NOTES
Hospitalist Progress Note    NAME:   Mey Lawrence   : 1940   MRN: [id number]     Date/Time: 2024 2:04 PM  Patient PCP: Bela Jolley MD    Estimated discharge date: 24 to 48 hours  Barriers: IV antibiotics, Solu-Medrol and pulmonary clearance    Hospital course:  Patient is an 84-year-old female with a past medical history of lung cancer remission, COPD, hypertension, DVT on anticoagulation, chronic pleural effusion and hypothyroidism who was admitted on 2024 for acute on chronic hypoxic respiratory failure.  CXR revealed unchanged moderate left pleural effusion.  No pneumothorax.  CTA of the chest revealed multiple foci of groundglass and semisolid opacification, most likely reflecting foci of infection inflammation.  A few areas have more solid nodular appearance.  Complete collapse of left lower lobe which is chronic and unchanged from previous imaging in 2023.  Small right and large left pleural effusion with unchanged areas of pleural thickening on the left, debris versus neoplasm.  No pulmonary embolism.  Pulmonology evaluated patient and agreed with empiric coverage with IV Rocephin and azithromycin.  Also, agrees with continued albuterol, Atrovent and Solu-Medrol.  Patient has chronic left lower effusion and left lower lobe collapse so we will have IR consulted for left thoracentesis.  IR complete left thoracentesis removing 300 cc of clear light yellow fluid, fluid sent to lab for analysis. Pleural fluid appearance was cloudy, RBC greater than 100 with 2 polys and pleural fluid.  Pleural pH 7.0, pleural fluid protein 3.3 and LDH 98.  The rest of chemistry results pending.    Assessment / Plan:  Acute on chronic hypoxic respiratory failure, currently on 3 L of supplemental oxygen via nasal cannula, which apparently is baseline for patient  Community-acquired pneumonia  History of lung cancer in remission with chronic left pleural effusion s/p thoracentesis  -Check blood

## 2024-06-22 VITALS
RESPIRATION RATE: 19 BRPM | TEMPERATURE: 97.9 F | WEIGHT: 94 LBS | BODY MASS INDEX: 17.3 KG/M2 | HEART RATE: 83 BPM | DIASTOLIC BLOOD PRESSURE: 62 MMHG | HEIGHT: 62 IN | OXYGEN SATURATION: 99 % | SYSTOLIC BLOOD PRESSURE: 114 MMHG

## 2024-06-22 LAB
ANION GAP SERPL CALC-SCNC: 5 MMOL/L (ref 5–15)
BASOPHILS # BLD: 0 K/UL (ref 0–0.1)
BASOPHILS NFR BLD: 0 % (ref 0–1)
BUN SERPL-MCNC: 36 MG/DL (ref 6–20)
BUN/CREAT SERPL: 60 (ref 12–20)
CA-I BLD-MCNC: 9 MG/DL (ref 8.5–10.1)
CHLORIDE SERPL-SCNC: 110 MMOL/L (ref 97–108)
CO2 SERPL-SCNC: 28 MMOL/L (ref 21–32)
CREAT SERPL-MCNC: 0.6 MG/DL (ref 0.55–1.02)
DIFFERENTIAL METHOD BLD: ABNORMAL
EOSINOPHIL # BLD: 0 K/UL (ref 0–0.4)
EOSINOPHIL NFR BLD: 0 % (ref 0–7)
ERYTHROCYTE [DISTWIDTH] IN BLOOD BY AUTOMATED COUNT: 14.9 % (ref 11.5–14.5)
GLUCOSE SERPL-MCNC: 116 MG/DL (ref 65–100)
HCT VFR BLD AUTO: 32.8 % (ref 35–47)
HGB BLD-MCNC: 10.6 G/DL (ref 11.5–16)
IMM GRANULOCYTES # BLD AUTO: 0.1 K/UL (ref 0–0.04)
IMM GRANULOCYTES NFR BLD AUTO: 1 % (ref 0–0.5)
LYMPHOCYTES # BLD: 0.7 K/UL (ref 0.8–3.5)
LYMPHOCYTES NFR BLD: 8 % (ref 12–49)
MCH RBC QN AUTO: 29 PG (ref 26–34)
MCHC RBC AUTO-ENTMCNC: 32.3 G/DL (ref 30–36.5)
MCV RBC AUTO: 89.6 FL (ref 80–99)
MONOCYTES # BLD: 0.6 K/UL (ref 0–1)
MONOCYTES NFR BLD: 6 % (ref 5–13)
NEUTS SEG # BLD: 7.9 K/UL (ref 1.8–8)
NEUTS SEG NFR BLD: 85 % (ref 32–75)
NRBC # BLD: 0 K/UL (ref 0–0.01)
NRBC BLD-RTO: 0 PER 100 WBC
PLATELET # BLD AUTO: 255 K/UL (ref 150–400)
PMV BLD AUTO: 10.4 FL (ref 8.9–12.9)
POTASSIUM SERPL-SCNC: 4 MMOL/L (ref 3.5–5.1)
RBC # BLD AUTO: 3.66 M/UL (ref 3.8–5.2)
SODIUM SERPL-SCNC: 143 MMOL/L (ref 136–145)
WBC # BLD AUTO: 9.2 K/UL (ref 3.6–11)

## 2024-06-22 PROCEDURE — 2700000000 HC OXYGEN THERAPY PER DAY

## 2024-06-22 PROCEDURE — 6360000002 HC RX W HCPCS: Performed by: INTERNAL MEDICINE

## 2024-06-22 PROCEDURE — 6370000000 HC RX 637 (ALT 250 FOR IP): Performed by: INTERNAL MEDICINE

## 2024-06-22 PROCEDURE — 36415 COLL VENOUS BLD VENIPUNCTURE: CPT

## 2024-06-22 PROCEDURE — 94640 AIRWAY INHALATION TREATMENT: CPT

## 2024-06-22 PROCEDURE — 94761 N-INVAS EAR/PLS OXIMETRY MLT: CPT

## 2024-06-22 PROCEDURE — 80048 BASIC METABOLIC PNL TOTAL CA: CPT

## 2024-06-22 PROCEDURE — 2580000003 HC RX 258: Performed by: INTERNAL MEDICINE

## 2024-06-22 PROCEDURE — 85025 COMPLETE CBC W/AUTO DIFF WBC: CPT

## 2024-06-22 RX ORDER — AZITHROMYCIN 500 MG/1
500 TABLET, FILM COATED ORAL DAILY
Qty: 3 TABLET | Refills: 0 | Status: SHIPPED | OUTPATIENT
Start: 2024-06-22 | End: 2024-06-25

## 2024-06-22 RX ORDER — GUAIFENESIN 200 MG/10ML
200 LIQUID ORAL EVERY 4 HOURS PRN
Qty: 180 ML | Refills: 0 | Status: SHIPPED | OUTPATIENT
Start: 2024-06-22 | End: 2024-06-29

## 2024-06-22 RX ORDER — PREDNISONE 20 MG/1
TABLET ORAL
Qty: 10 TABLET | Refills: 0 | Status: SHIPPED | OUTPATIENT
Start: 2024-06-22 | End: 2024-07-01

## 2024-06-22 RX ADMIN — CETIRIZINE HYDROCHLORIDE 10 MG: 10 TABLET, FILM COATED ORAL at 09:17

## 2024-06-22 RX ADMIN — METOPROLOL SUCCINATE 25 MG: 25 TABLET, EXTENDED RELEASE ORAL at 09:18

## 2024-06-22 RX ADMIN — SODIUM CHLORIDE, PRESERVATIVE FREE 10 ML: 5 INJECTION INTRAVENOUS at 09:22

## 2024-06-22 RX ADMIN — METHYLPREDNISOLONE SODIUM SUCCINATE 40 MG: 40 INJECTION INTRAMUSCULAR; INTRAVENOUS at 13:42

## 2024-06-22 RX ADMIN — FERROUS SULFATE TAB 325 MG (65 MG ELEMENTAL FE) 325 MG: 325 (65 FE) TAB at 09:17

## 2024-06-22 RX ADMIN — AZITHROMYCIN MONOHYDRATE 500 MG: 500 INJECTION, POWDER, LYOPHILIZED, FOR SOLUTION INTRAVENOUS at 09:18

## 2024-06-22 RX ADMIN — Medication 1 TABLET: at 09:16

## 2024-06-22 RX ADMIN — METHYLPREDNISOLONE SODIUM SUCCINATE 40 MG: 40 INJECTION INTRAMUSCULAR; INTRAVENOUS at 06:14

## 2024-06-22 RX ADMIN — APIXABAN 2.5 MG: 2.5 TABLET, FILM COATED ORAL at 09:16

## 2024-06-22 RX ADMIN — FOLIC ACID 1 MG: 1 TABLET ORAL at 09:17

## 2024-06-22 RX ADMIN — Medication 1 G: at 09:16

## 2024-06-22 RX ADMIN — Medication 1000 UNITS: at 09:16

## 2024-06-22 RX ADMIN — BUDESONIDE 500 MCG: 0.5 SUSPENSION RESPIRATORY (INHALATION) at 09:24

## 2024-06-22 RX ADMIN — LEVOTHYROXINE SODIUM 100 MCG: 0.1 TABLET ORAL at 06:14

## 2024-06-22 NOTE — PLAN OF CARE
Problem: Discharge Planning  Goal: Discharge to home or other facility with appropriate resources  6/22/2024 1614 by Zoey Long LPN  Outcome: Progressing  6/22/2024 1614 by Zoey Long LPN  Outcome: Progressing  6/22/2024 0927 by Zoey Long LPN  Outcome: Progressing  6/22/2024 0425 by Steffany Cavanaugh, RN  Outcome: Progressing     Problem: Skin/Tissue Integrity  Goal: Absence of new skin breakdown  Description: 1.  Monitor for areas of redness and/or skin breakdown  2.  Assess vascular access sites hourly  3.  Every 4-6 hours minimum:  Change oxygen saturation probe site  4.  Every 4-6 hours:  If on nasal continuous positive airway pressure, respiratory therapy assess nares and determine need for appliance change or resting period.  6/22/2024 1614 by Zoey Long LPN  Outcome: Progressing  6/22/2024 1614 by Zoey Long LPN  Outcome: Progressing  6/22/2024 0927 by Zoey Long LPN  Outcome: Progressing  6/22/2024 0425 by Steffany Cavanaugh RN  Outcome: Progressing     Problem: Safety - Adult  Goal: Free from fall injury  6/22/2024 1614 by Zoey Long LPN  Outcome: Progressing  6/22/2024 1614 by Zoey Long LPN  Outcome: Progressing  6/22/2024 0927 by Zoey Long LPN  Outcome: Progressing  6/22/2024 0425 by Steffany Cavanaugh, RN  Outcome: Progressing

## 2024-06-22 NOTE — CARE COORDINATION
Transition of Care Plan:    RUR: 17%  Prior Level of Functioning: INDEP  Disposition: HOME/SELF  If SNF or IPR: Date FOC offered: NA  Date FOC received: NA  Accepting facility: HOME  Date authorization started with reference number: NA  Date authorization received and expires: NA  Follow up appointments: PCP  DME needed: NONE  Transportation at discharge: FAMILY  IM/IMM Medicare/ letter given: YES  Is patient a [employer] and connected with [facility]? NO   If yes, was  transfer form completed and [facility] notified?   Caregiver Contact: Rashmi Whiting   Discharge Caregiver contacted prior to discharge? AT BEDSIDE  Care Conference needed? NO  Barriers to discharge: NONE    Pt ready to dc, has no needs from CM, ambulatory has PRN O2 at home, family to transport.

## 2024-06-22 NOTE — PLAN OF CARE
Problem: Discharge Planning  Goal: Discharge to home or other facility with appropriate resources  6/22/2024 0927 by Zoey Long LPN  Outcome: Progressing  6/22/2024 0425 by Steffany Cavanaugh RN  Outcome: Progressing     Problem: Skin/Tissue Integrity  Goal: Absence of new skin breakdown  Description: 1.  Monitor for areas of redness and/or skin breakdown  2.  Assess vascular access sites hourly  3.  Every 4-6 hours minimum:  Change oxygen saturation probe site  4.  Every 4-6 hours:  If on nasal continuous positive airway pressure, respiratory therapy assess nares and determine need for appliance change or resting period.  6/22/2024 0927 by Zoey Long LPN  Outcome: Progressing  6/22/2024 0425 by Steffany Cavanaugh RN  Outcome: Progressing     Problem: Safety - Adult  Goal: Free from fall injury  6/22/2024 0927 by Zoey Long LPN  Outcome: Progressing  6/22/2024 0425 by Steffany Cavanaugh RN  Outcome: Progressing

## 2024-06-22 NOTE — DISCHARGE SUMMARY
Discharge Summary    Name: Mey Lawrence  [id number]  YOB: 1940 (Age: 84 y.o.)   Date of Admission: 6/19/2024  Date of Discharge: 6/22/2024  Attending Physician: Vidal Middleton MD    Discharge Diagnosis:   Principal Problem:    Pneumonia due to other specified infectious organisms  Resolved Problems:    * No resolved hospital problems. *  Acute on chronic hypoxic respiratory failure  Chronic left pleural effusion s/p thoracentesis  Hypertension  History of DVT on anticoagulation  Hypothyroidism  Normocytic anemia  Dementia    Consultations:  IP CONSULT TO PULMONOLOGY  IP WOUND CARE NURSE CONSULT TO EVAL      Brief Admission History/Reason for Admission Per Julio César Garcia Cha, MD:   Acute on chronic hypoxic respiratory failure    Brief Hospital Course by Main Problems:   Patient is an 84-year-old female with past medical history of lung cancer in remission, COPD, hypertension, DVT on anticoagulation, chronic pleural effusion and hypothyroidism who was admitted on 6/19/2024 for acute on chronic hypoxic respiratory failure.  CXR revealed unchanged moderate left pleural effusion.  No pneumothorax.  CTA of the chest revealed multiple foci of groundglass and semisolid opacification, most likely reflecting foci of infection or inflammation.  A few areas have more solid nodular appearance.  Complete collapse of the left lower lobe which is chronic and unchanged since previous imaging in March 2023.  Small right and large left pleural effusion with unchanged areas of pleural thickening on the left, debris versus neoplasm.  No pulmonary embolism.  Pulmonology evaluated patient agrees with empiric antibiotic coverage with IV Rocephin and azithromycin at this time.  Also, will have patient continue albuterol, Atrovent and Solu-Medrol.  Patient has chronic left pleural effusion and left lower lobe collapse so will have IR complete left thoracentesis.  IR completed let

## 2024-06-23 LAB
BACTERIA SPEC CULT: NORMAL
BACTERIA SPEC CULT: NORMAL
L PNEUMO1 AG UR QL IA: NEGATIVE
Lab: NORMAL
Lab: NORMAL
SPECIMEN SOURCE: NORMAL

## 2024-06-24 LAB
BACTERIA SPEC CULT: NORMAL
GRAM STN SPEC: NORMAL
Lab: NORMAL

## 2024-06-25 LAB
BACTERIA SPEC CULT: NORMAL
BACTERIA SPEC CULT: NORMAL
Lab: NORMAL
Lab: NORMAL

## 2024-07-02 DIAGNOSIS — E03.9 ACQUIRED HYPOTHYROIDISM: ICD-10-CM

## 2024-07-03 RX ORDER — LEVOTHYROXINE SODIUM 88 UG/1
TABLET ORAL
Qty: 90 TABLET | Refills: 0 | Status: SHIPPED | OUTPATIENT
Start: 2024-07-03

## 2024-09-19 DIAGNOSIS — E03.9 ACQUIRED HYPOTHYROIDISM: ICD-10-CM

## 2024-09-19 RX ORDER — LEVOTHYROXINE SODIUM 88 UG/1
TABLET ORAL
Qty: 102 TABLET | Refills: 3 | Status: SHIPPED | OUTPATIENT
Start: 2024-09-19

## 2024-10-03 LAB — TSH SERPL DL<=0.005 MIU/L-ACNC: 3.16 UIU/ML (ref 0.45–4.5)

## 2025-02-13 LAB
25(OH)D3+25(OH)D2 SERPL-MCNC: 106 NG/ML (ref 30–100)
ALBUMIN SERPL-MCNC: 4 G/DL (ref 3.7–4.7)
ALP SERPL-CCNC: 62 IU/L (ref 44–121)
ALT SERPL-CCNC: 13 IU/L (ref 0–32)
AST SERPL-CCNC: 15 IU/L (ref 0–40)
BILIRUB SERPL-MCNC: 0.4 MG/DL (ref 0–1.2)
BUN SERPL-MCNC: 20 MG/DL (ref 8–27)
BUN/CREAT SERPL: 22 (ref 12–28)
CALCIUM SERPL-MCNC: 9.7 MG/DL (ref 8.7–10.3)
CHLORIDE SERPL-SCNC: 101 MMOL/L (ref 96–106)
CO2 SERPL-SCNC: 25 MMOL/L (ref 20–29)
CREAT SERPL-MCNC: 0.91 MG/DL (ref 0.57–1)
EGFRCR SERPLBLD CKD-EPI 2021: 62 ML/MIN/1.73
GLOBULIN SER CALC-MCNC: 3.2 G/DL (ref 1.5–4.5)
GLUCOSE SERPL-MCNC: 77 MG/DL (ref 70–99)
POTASSIUM SERPL-SCNC: 3.8 MMOL/L (ref 3.5–5.2)
PROT SERPL-MCNC: 7.2 G/DL (ref 6–8.5)
SODIUM SERPL-SCNC: 142 MMOL/L (ref 134–144)
T4 FREE SERPL-MCNC: 1.5 NG/DL (ref 0.82–1.77)
TSH SERPL DL<=0.005 MIU/L-ACNC: 10.4 UIU/ML (ref 0.45–4.5)

## 2025-02-17 ENCOUNTER — TELEPHONE (OUTPATIENT)
Age: 85
End: 2025-02-17

## 2025-02-17 NOTE — TELEPHONE ENCOUNTER
----- Message from Dr. Almaz Smith MD sent at 2/15/2025 11:44 PM EST -----  Vitamin D level is very high, to hold all vitamin D supplements until she sees Dr. Nava

## 2025-04-09 ENCOUNTER — TELEPHONE (OUTPATIENT)
Age: 85
End: 2025-04-09

## 2025-05-08 ENCOUNTER — OFFICE VISIT (OUTPATIENT)
Age: 85
End: 2025-05-08
Payer: MEDICARE

## 2025-05-08 VITALS
HEART RATE: 88 BPM | HEIGHT: 62 IN | BODY MASS INDEX: 20.09 KG/M2 | WEIGHT: 109.2 LBS | OXYGEN SATURATION: 95 % | SYSTOLIC BLOOD PRESSURE: 117 MMHG | TEMPERATURE: 98.1 F | DIASTOLIC BLOOD PRESSURE: 54 MMHG

## 2025-05-08 DIAGNOSIS — M81.0 AGE-RELATED OSTEOPOROSIS WITHOUT CURRENT PATHOLOGICAL FRACTURE: ICD-10-CM

## 2025-05-08 DIAGNOSIS — E03.9 ACQUIRED HYPOTHYROIDISM: ICD-10-CM

## 2025-05-08 DIAGNOSIS — E55.9 VITAMIN D DEFICIENCY, UNSPECIFIED: Primary | ICD-10-CM

## 2025-05-08 PROCEDURE — 1159F MED LIST DOCD IN RCRD: CPT | Performed by: INTERNAL MEDICINE

## 2025-05-08 PROCEDURE — 99214 OFFICE O/P EST MOD 30 MIN: CPT | Performed by: INTERNAL MEDICINE

## 2025-05-08 PROCEDURE — 1160F RVW MEDS BY RX/DR IN RCRD: CPT | Performed by: INTERNAL MEDICINE

## 2025-05-08 PROCEDURE — 1123F ACP DISCUSS/DSCN MKR DOCD: CPT | Performed by: INTERNAL MEDICINE

## 2025-05-08 PROCEDURE — 1126F AMNT PAIN NOTED NONE PRSNT: CPT | Performed by: INTERNAL MEDICINE

## 2025-05-08 RX ORDER — ALPRAZOLAM 0.25 MG
0.12 TABLET ORAL 2 TIMES DAILY
COMMUNITY
Start: 2024-10-01

## 2025-05-08 RX ORDER — CALCIUM CARBONATE 500(1250)
500 TABLET ORAL DAILY
COMMUNITY

## 2025-05-08 RX ORDER — MEMANTINE HYDROCHLORIDE 28 MG/1
28 CAPSULE, EXTENDED RELEASE ORAL EVERY MORNING
COMMUNITY
Start: 2025-04-28

## 2025-05-08 RX ORDER — FLUTICASONE PROPIONATE 50 MCG
1 SPRAY, SUSPENSION (ML) NASAL DAILY PRN
COMMUNITY

## 2025-05-08 RX ORDER — DONEPEZIL HYDROCHLORIDE 10 MG/1
10 TABLET, FILM COATED ORAL NIGHTLY
COMMUNITY
Start: 2025-02-27

## 2025-05-08 RX ORDER — ANTIOX #8/OM3/DHA/EPA/LUT/ZEAX 250-2.5 MG
1 CAPSULE ORAL 2 TIMES DAILY
COMMUNITY

## 2025-05-08 NOTE — PROGRESS NOTES
Mey Lawrence is a 85 y.o. female here for   Chief Complaint   Patient presents with    Thyroid Problem    Osteoporosis       1. Have you been to the ER, urgent care clinic since your last visit?  Hospitalized since your last visit? - 4/22/24- Pneumonia & 6/19/24- COPD     2. Have you seen or consulted any other health care providers outside of the [facility] since your last visit?  Include any pap smears or colon screening.- [facility], Cardiologist, PCP, Pulmonologist,      Declines

## 2025-05-08 NOTE — PROGRESS NOTES
History and Physical            Patient: Mey Lawrence  MRN: [id number]   SSN: [id number]          YOB: 1940             Subjective:               Mey Lawrence is a 83 y.o.  female with past medical history of lung cancer followed by  Dr. Grullon, osteoporosis, has reclast  infusions -  at [facility]  , hypothyroidism   , DEMENTIA   on meds       from last  visit march 2024         Pt has changed from Dr Ochoa , last march 2022    Pt saw Dr. Nevarez before seeing Dr. Ochoa     Pt is accompanied by  daughter          March 2024     Daughter is here at the visit  and she provides history  and she  keeps up with mother's health and meds    She claims  synthroid id always taken fasting      She was hospitalized  in feb 2024 March 2022       History was mainly obtained from patient's daughter.  Patient is a poor historian because of memory issues.      Hypothyroidism:   Currently she is on namebrand Synthroid 88 mcg 1 tablet every day.  She has been on this dose for more than 1 year.  She takes it regularly and appropriately.  Appetite is good, bowel movements are regular, she denies any palpitations or tremors, sleep  is okay.  Weight has fluctuated recently but she has other health issues going on.      Osteoporosis:   Last bone density scan was at [facility] 2 years back.   A very long time back she was briefly treated with Fosamax which she did not continue.  When she had bone density scan 2 years back, she was started on Reclast infusion.  Received first Reclast infusion in January 2021, received second Reclast infusion  in January 2022, this time it was ordered by patient's hematologist oncologist Dr. Grullon.   She takes calcium tablet twice a day, tries to consume 1-2 servings of dairy every day, takes vitamin D 2000 units every day.   She has had a couple kidney stones in the past.   Never broken any bones.   She has not had bone density scan repeated since

## 2025-05-08 NOTE — PATIENT INSTRUCTIONS
Synthroid 88 mcg    BRAND  daily   and extra pill on Sundays ,   on empty stomach with water only, no other meds or food or drinks   For next half hour     Take any kind of vitamins, calcium, iron   Pills  4 hours later    ----------------------------------------------------------------no reclast this year 2025   and  2026

## 2025-05-17 LAB
25(OH)D3+25(OH)D2 SERPL-MCNC: 80.2 NG/ML (ref 30–100)
TSH SERPL DL<=0.005 MIU/L-ACNC: 4.16 UIU/ML (ref 0.45–4.5)

## 2025-05-23 DIAGNOSIS — E03.9 ACQUIRED HYPOTHYROIDISM: ICD-10-CM

## 2025-05-23 RX ORDER — LEVOTHYROXINE SODIUM 88 MCG
TABLET ORAL
Qty: 105 TABLET | Refills: 3 | Status: SHIPPED | OUTPATIENT
Start: 2025-05-23

## 2025-08-21 ENCOUNTER — HOSPITAL ENCOUNTER (OUTPATIENT)
Facility: HOSPITAL | Age: 85
Discharge: HOME | End: 2025-08-24
Attending: INTERNAL MEDICINE
Payer: MEDICARE

## 2025-08-21 DIAGNOSIS — D50.9 NORMOCYTIC HYPOCHROMIC ANEMIA: ICD-10-CM

## 2025-08-21 DIAGNOSIS — C34.30 MALIGNANT NEOPLASM OF LOWER LOBE OF LUNG, UNSPECIFIED LATERALITY (HCC): ICD-10-CM

## 2025-08-21 DIAGNOSIS — I82.401 ACUTE EMBOLISM AND THROMBOSIS OF DEEP VEIN OF RIGHT LOWER EXTREMITY (HCC): ICD-10-CM

## 2025-08-21 PROCEDURE — 36590 REMOVAL TUNNELED CV CATH: CPT
